# Patient Record
Sex: MALE | Race: BLACK OR AFRICAN AMERICAN | Employment: UNEMPLOYED | URBAN - METROPOLITAN AREA
[De-identification: names, ages, dates, MRNs, and addresses within clinical notes are randomized per-mention and may not be internally consistent; named-entity substitution may affect disease eponyms.]

---

## 2019-04-07 ENCOUNTER — HOSPITAL ENCOUNTER (EMERGENCY)
Age: 54
Discharge: HOME | End: 2019-04-07
Attending: EMERGENCY MEDICINE
Payer: MEDICARE

## 2019-04-07 DIAGNOSIS — Z53.20 PATIENT REFUSED EVALUATION OR TREATMENT: Primary | ICD-10-CM

## 2019-04-07 PROCEDURE — 99283 EMERGENCY DEPT VISIT LOW MDM: CPT

## 2019-04-08 NOTE — ED PROVIDER NOTES
[address]  ED  Emergency Department Encounter  EmergencyMedicine Resident     Pt Name:Robby Grissom  MRN: [id number]  [place] 1965  Date of evaluation: 4/7/19  PCP:  No primary care provider on file. CHIEF COMPLAINT       Chief Complaint   Patient presents with    Other     told he may have swallowed something. pt refusing treatment       HISTORY OF PRESENT ILLNESS  (Location/Symptom, Timing/Onset, Context/Setting, Quality, Duration, Modifying Factors, Severity.)      Marycarmen Moreno is a 47 y.o. male who presents with a complaint that he is refusing treatment, the patient was brought in by [employer] after booking he is reported to 10 same ingested something. The patient adamantly denies this. I explained to him his risks including death if he ingested something and did not how me. The patient refuses all treatment and refuses to let me evaluate him. He is alert and oriented ×3 and understands the risk of refusing treatment. PAST MEDICAL / SURGICAL / SOCIAL / FAMILY HISTORY      has no past medical history on file. has no past surgical history on file.     Social History     Socioeconomic History    Marital status: Single     Spouse name: Not on file    Number of children: Not on file    Years of education: Not on file    Highest education level: Not on file   Occupational History    Not on file   Social Needs    Financial resource strain: Not on file    Food insecurity:     Worry: Not on file     Inability: Not on file    Transportation needs:     Medical: Not on file     Non-medical: Not on file   Tobacco Use    Smoking status: Not on file   Substance and Sexual Activity    Alcohol use: Not on file    Drug use: Not on file    Sexual activity: Not on file   Lifestyle    Physical activity:     Days per week: Not on file     Minutes per session: Not on file    Stress: Not on file   Relationships    Social connections:     Talks on phone: Not on file     Gets together: Not on file     Attends [employer]ian service: Not on file     Active member of club or organization: Not on file     Attends meetings of clubs or organizations: Not on file     Relationship status: Not on file    Intimate partner violence:     Fear of current or ex partner: Not on file     Emotionally abused: Not on file     Physically abused: Not on file     Forced sexual activity: Not on file   Other Topics Concern    Not on file   Social History Narrative    Not on file       No family history on file. Allergies:  Patient has no known allergies. Home Medications:  Prior to Admission medications    Not on File       REVIEW OF SYSTEMS    (2-9 systems for level 4, 10 or more for level 5)      Review of Systems  She refuses to answer questions regarding his health. PHYSICAL EXAM   (up to 7 for level 4, 8 or more for level 5)      INITIAL VITALS:   There were no vitals taken for this visit. Physical Exam     And alert and oriented ×3 I believe he has the capacity to understand his medical decision making, he will not allow me evaluate him. DIFFERENTIAL  DIAGNOSIS     PLAN (LABS / IMAGING / EKG):  No orders of the defined types were placed in this encounter. MEDICATIONS ORDERED:  No orders of the defined types were placed in this encounter. DDX: Refusing treatment. DIAGNOSTIC RESULTS / EMERGENCY DEPARTMENT COURSE / MDM     LABS:  No results found for this visit on 04/07/19. RADIOLOGY:     No results found. MDM/EMERGENCY DEPARTMENT COURSE:      ED Course as of Apr 07 2053   Jessie Vegas Apr 07, 2019 2051 Patient brought in by [employer] after concern for ingestion, patient will not allow us to evaluate him he is alert and oriented ×3,  he has a medical decision making capacity. [KW]      ED Course User Index  [KW] El Hargrove DO           PROCEDURES:  None    CONSULTS:  None    CRITICAL CARE:  None    FINAL IMPRESSION      1.  Patient refused evaluation or treatment DISPOSITION / PLAN     DISPOSITION Decision To Discharge    PATIENT REFERRED TO:  No follow-up provider specified.     DISCHARGE MEDICATIONS:  New Prescriptions    No medications on file       Edel Charles DO  Emergency Medicine Resident    (Please note that portions of this note were completed with a voicerecognition program.  Efforts were made to edit the dictations but occasionally words are mis-transcribed.)       Edel Charles DO  04/07/19 7809

## 2019-04-08 NOTE — ED NOTES
Pt brought in in police custody. Per [employer] they saw him swallow something . Pt is denying swallowing anything and refusing treatment.  Will notify resident     Tameka Burns RN  04/07/19 2030

## 2020-04-12 ENCOUNTER — APPOINTMENT (OUTPATIENT)
Dept: GENERAL RADIOLOGY | Age: 55
End: 2020-04-12

## 2020-04-12 ENCOUNTER — HOSPITAL ENCOUNTER (OUTPATIENT)
Age: 55
Setting detail: OBSERVATION
Discharge: HOME | End: 2020-04-14
Attending: EMERGENCY MEDICINE | Admitting: INTERNAL MEDICINE

## 2020-04-12 ENCOUNTER — APPOINTMENT (OUTPATIENT)
Dept: CT IMAGING | Age: 55
End: 2020-04-12

## 2020-04-12 PROBLEM — K92.0 GASTROINTESTINAL HEMORRHAGE WITH HEMATEMESIS: Status: ACTIVE | Noted: 2020-04-12

## 2020-04-12 PROBLEM — R74.01 TRANSAMINASEMIA: Status: ACTIVE | Noted: 2020-04-12

## 2020-04-12 PROBLEM — R65.10 SIRS (SYSTEMIC INFLAMMATORY RESPONSE SYNDROME) (HCC): Status: ACTIVE | Noted: 2020-04-12

## 2020-04-12 PROBLEM — R31.9 HEMATURIA: Status: ACTIVE | Noted: 2020-04-12

## 2020-04-12 PROBLEM — Z20.822 SUSPECTED COVID-19 VIRUS INFECTION: Status: ACTIVE | Noted: 2020-04-12

## 2020-04-12 PROBLEM — F10.10 ETOH ABUSE: Status: ACTIVE | Noted: 2020-04-12

## 2020-04-12 PROBLEM — N30.90 CYSTITIS: Status: ACTIVE | Noted: 2020-04-12

## 2020-04-12 PROBLEM — R07.89 OTHER CHEST PAIN: Status: ACTIVE | Noted: 2020-04-12

## 2020-04-12 PROBLEM — E86.0 ACUTE DEHYDRATION: Status: ACTIVE | Noted: 2020-04-12

## 2020-04-12 LAB
-: ABNORMAL
ABO/RH: NORMAL
ABSOLUTE EOS #: 0.03 K/UL (ref 0–0.44)
ABSOLUTE IMMATURE GRANULOCYTE: <0.03 K/UL (ref 0–0.3)
ABSOLUTE LYMPH #: 1.9 K/UL (ref 1.1–3.7)
ABSOLUTE MONO #: 0.77 K/UL (ref 0.1–1.2)
ALBUMIN SERPL-MCNC: 4.6 G/DL (ref 3.5–5.2)
ALBUMIN/GLOBULIN RATIO: 0.9 (ref 1–2.5)
ALP BLD-CCNC: 94 U/L (ref 40–129)
ALT SERPL-CCNC: 102 U/L (ref 5–41)
AMORPHOUS: ABNORMAL
ANION GAP SERPL CALCULATED.3IONS-SCNC: 19 MMOL/L (ref 9–17)
ANTIBODY SCREEN: NEGATIVE
ARM BAND NUMBER: NORMAL
AST SERPL-CCNC: 99 U/L
BACTERIA: ABNORMAL
BASOPHILS # BLD: 0 % (ref 0–2)
BASOPHILS ABSOLUTE: <0.03 K/UL (ref 0–0.2)
BILIRUB SERPL-MCNC: 0.84 MG/DL (ref 0.3–1.2)
BILIRUBIN URINE: NEGATIVE
BNP INTERPRETATION: NORMAL
BUN BLDV-MCNC: 8 MG/DL (ref 6–20)
BUN/CREAT BLD: ABNORMAL (ref 9–20)
CALCIUM SERPL-MCNC: 10 MG/DL (ref 8.6–10.4)
CASTS UA: ABNORMAL /LPF (ref 0–8)
CHLORIDE BLD-SCNC: 96 MMOL/L (ref 98–107)
CO2: 19 MMOL/L (ref 20–31)
COLOR: YELLOW
CREAT SERPL-MCNC: 1.18 MG/DL (ref 0.7–1.2)
CRYSTALS, UA: ABNORMAL /HPF
DIFFERENTIAL TYPE: ABNORMAL
EOSINOPHILS RELATIVE PERCENT: 0 % (ref 1–4)
EPITHELIAL CELLS UA: ABNORMAL /HPF (ref 0–5)
EXPIRATION DATE: NORMAL
FERRITIN: 243 UG/L (ref 30–400)
GFR AFRICAN AMERICAN: >60 ML/MIN
GFR NON-AFRICAN AMERICAN: >60 ML/MIN
GFR SERPL CREATININE-BSD FRML MDRD: ABNORMAL ML/MIN/{1.73_M2}
GFR SERPL CREATININE-BSD FRML MDRD: ABNORMAL ML/MIN/{1.73_M2}
GLUCOSE BLD-MCNC: 98 MG/DL (ref 70–99)
GLUCOSE URINE: NEGATIVE
HCT VFR BLD CALC: 50.9 % (ref 40.7–50.3)
HEMOGLOBIN: 16.3 G/DL (ref 13–17)
IMMATURE GRANULOCYTES: 0 %
KETONES, URINE: ABNORMAL
LACTATE DEHYDROGENASE: 320 U/L (ref 135–225)
LACTIC ACID, SEPSIS WHOLE BLOOD: 2.2 MMOL/L (ref 0.5–1.9)
LACTIC ACID, SEPSIS WHOLE BLOOD: 5.8 MMOL/L (ref 0.5–1.9)
LACTIC ACID, SEPSIS: ABNORMAL MMOL/L (ref 0.5–1.9)
LACTIC ACID, SEPSIS: ABNORMAL MMOL/L (ref 0.5–1.9)
LEUKOCYTE ESTERASE, URINE: NEGATIVE
LIPASE: 41 U/L (ref 13–60)
LYMPHOCYTES # BLD: 23 % (ref 24–43)
MCH RBC QN AUTO: 30.5 PG (ref 25.2–33.5)
MCHC RBC AUTO-ENTMCNC: 32 G/DL (ref 28.4–34.8)
MCV RBC AUTO: 95.3 FL (ref 82.6–102.9)
MONOCYTES # BLD: 9 % (ref 3–12)
MUCUS: ABNORMAL
NITRITE, URINE: NEGATIVE
NRBC AUTOMATED: 0 PER 100 WBC
OTHER OBSERVATIONS UA: ABNORMAL
PDW BLD-RTO: 13.5 % (ref 11.8–14.4)
PH UA: 6.5 (ref 5–8)
PLATELET # BLD: 293 K/UL (ref 138–453)
PLATELET ESTIMATE: ABNORMAL
PMV BLD AUTO: 9.8 FL (ref 8.1–13.5)
POTASSIUM SERPL-SCNC: 4.7 MMOL/L (ref 3.7–5.3)
PRO-BNP: 49 PG/ML
PROTEIN UA: NEGATIVE
RBC # BLD: 5.34 M/UL (ref 4.21–5.77)
RBC # BLD: ABNORMAL 10*6/UL
RBC UA: ABNORMAL /HPF (ref 0–4)
RENAL EPITHELIAL, UA: ABNORMAL /HPF
SEG NEUTROPHILS: 68 % (ref 36–65)
SEGMENTED NEUTROPHILS ABSOLUTE COUNT: 5.43 K/UL (ref 1.5–8.1)
SODIUM BLD-SCNC: 134 MMOL/L (ref 135–144)
SPECIFIC GRAVITY UA: 1.03 (ref 1–1.03)
TOTAL PROTEIN: 9.5 G/DL (ref 6.4–8.3)
TRICHOMONAS: ABNORMAL
TROPONIN INTERP: NORMAL
TROPONIN INTERP: NORMAL
TROPONIN T: NORMAL NG/ML
TROPONIN T: NORMAL NG/ML
TROPONIN, HIGH SENSITIVITY: 14 NG/L (ref 0–22)
TROPONIN, HIGH SENSITIVITY: 16 NG/L (ref 0–22)
TURBIDITY: CLEAR
URINE HGB: ABNORMAL
UROBILINOGEN, URINE: NORMAL
WBC # BLD: 8.2 K/UL (ref 3.5–11.3)
WBC # BLD: ABNORMAL 10*3/UL
WBC UA: ABNORMAL /HPF (ref 0–5)
YEAST: ABNORMAL

## 2020-04-12 PROCEDURE — 0100U HC RESPIRPTHGN MULT REV TRANS & AMP PRB TECH 21 TRGT: CPT

## 2020-04-12 PROCEDURE — 80053 COMPREHEN METABOLIC PANEL: CPT

## 2020-04-12 PROCEDURE — 87491 CHLMYD TRACH DNA AMP PROBE: CPT

## 2020-04-12 PROCEDURE — 86900 BLOOD TYPING SEROLOGIC ABO: CPT

## 2020-04-12 PROCEDURE — U0002 COVID-19 LAB TEST NON-CDC: HCPCS

## 2020-04-12 PROCEDURE — 99285 EMERGENCY DEPT VISIT HI MDM: CPT

## 2020-04-12 PROCEDURE — 86901 BLOOD TYPING SEROLOGIC RH(D): CPT

## 2020-04-12 PROCEDURE — 6360000004 HC RX CONTRAST MEDICATION: Performed by: EMERGENCY MEDICINE

## 2020-04-12 PROCEDURE — 96376 TX/PRO/DX INJ SAME DRUG ADON: CPT

## 2020-04-12 PROCEDURE — 2580000003 HC RX 258: Performed by: EMERGENCY MEDICINE

## 2020-04-12 PROCEDURE — 96375 TX/PRO/DX INJ NEW DRUG ADDON: CPT

## 2020-04-12 PROCEDURE — 81001 URINALYSIS AUTO W/SCOPE: CPT

## 2020-04-12 PROCEDURE — 84484 ASSAY OF TROPONIN QUANT: CPT

## 2020-04-12 PROCEDURE — 86850 RBC ANTIBODY SCREEN: CPT

## 2020-04-12 PROCEDURE — 6360000002 HC RX W HCPCS: Performed by: NURSE PRACTITIONER

## 2020-04-12 PROCEDURE — 96365 THER/PROPH/DIAG IV INF INIT: CPT

## 2020-04-12 PROCEDURE — 83605 ASSAY OF LACTIC ACID: CPT

## 2020-04-12 PROCEDURE — 83615 LACTATE (LD) (LDH) ENZYME: CPT

## 2020-04-12 PROCEDURE — 87591 N.GONORRHOEAE DNA AMP PROB: CPT

## 2020-04-12 PROCEDURE — 99222 1ST HOSP IP/OBS MODERATE 55: CPT | Performed by: NURSE PRACTITIONER

## 2020-04-12 PROCEDURE — 6360000002 HC RX W HCPCS: Performed by: EMERGENCY MEDICINE

## 2020-04-12 PROCEDURE — 71045 X-RAY EXAM CHEST 1 VIEW: CPT

## 2020-04-12 PROCEDURE — 83690 ASSAY OF LIPASE: CPT

## 2020-04-12 PROCEDURE — 1200000000 HC SEMI PRIVATE

## 2020-04-12 PROCEDURE — 71275 CT ANGIOGRAPHY CHEST: CPT

## 2020-04-12 PROCEDURE — 83880 ASSAY OF NATRIURETIC PEPTIDE: CPT

## 2020-04-12 PROCEDURE — 82728 ASSAY OF FERRITIN: CPT

## 2020-04-12 PROCEDURE — G0378 HOSPITAL OBSERVATION PER HR: HCPCS

## 2020-04-12 PROCEDURE — 80307 DRUG TEST PRSMV CHEM ANLYZR: CPT

## 2020-04-12 PROCEDURE — 93005 ELECTROCARDIOGRAM TRACING: CPT | Performed by: EMERGENCY MEDICINE

## 2020-04-12 PROCEDURE — 85025 COMPLETE CBC W/AUTO DIFF WBC: CPT

## 2020-04-12 PROCEDURE — C9113 INJ PANTOPRAZOLE SODIUM, VIA: HCPCS | Performed by: EMERGENCY MEDICINE

## 2020-04-12 PROCEDURE — 96367 TX/PROPH/DG ADDL SEQ IV INF: CPT

## 2020-04-12 RX ORDER — MORPHINE SULFATE 4 MG/ML
4 INJECTION, SOLUTION INTRAMUSCULAR; INTRAVENOUS
Status: DISCONTINUED | OUTPATIENT
Start: 2020-04-12 | End: 2020-04-14 | Stop reason: HOSPADM

## 2020-04-12 RX ORDER — ONDANSETRON 2 MG/ML
4 INJECTION INTRAMUSCULAR; INTRAVENOUS EVERY 6 HOURS PRN
Status: DISCONTINUED | OUTPATIENT
Start: 2020-04-12 | End: 2020-04-14 | Stop reason: HOSPADM

## 2020-04-12 RX ORDER — 0.9 % SODIUM CHLORIDE 0.9 %
1000 INTRAVENOUS SOLUTION INTRAVENOUS ONCE
Status: COMPLETED | OUTPATIENT
Start: 2020-04-12 | End: 2020-04-12

## 2020-04-12 RX ORDER — ONDANSETRON 2 MG/ML
4 INJECTION INTRAMUSCULAR; INTRAVENOUS ONCE
Status: COMPLETED | OUTPATIENT
Start: 2020-04-12 | End: 2020-04-12

## 2020-04-12 RX ORDER — METOCLOPRAMIDE HYDROCHLORIDE 5 MG/ML
10 INJECTION INTRAMUSCULAR; INTRAVENOUS ONCE
Status: COMPLETED | OUTPATIENT
Start: 2020-04-12 | End: 2020-04-12

## 2020-04-12 RX ORDER — DIPHENHYDRAMINE HYDROCHLORIDE 50 MG/ML
25 INJECTION INTRAMUSCULAR; INTRAVENOUS ONCE
Status: COMPLETED | OUTPATIENT
Start: 2020-04-12 | End: 2020-04-12

## 2020-04-12 RX ORDER — PROMETHAZINE HYDROCHLORIDE 25 MG/1
12.5 TABLET ORAL EVERY 6 HOURS PRN
Status: DISCONTINUED | OUTPATIENT
Start: 2020-04-12 | End: 2020-04-14 | Stop reason: HOSPADM

## 2020-04-12 RX ORDER — MORPHINE SULFATE 4 MG/ML
2 INJECTION, SOLUTION INTRAMUSCULAR; INTRAVENOUS
Status: DISCONTINUED | OUTPATIENT
Start: 2020-04-12 | End: 2020-04-14 | Stop reason: HOSPADM

## 2020-04-12 RX ADMIN — MORPHINE SULFATE 4 MG: 4 INJECTION INTRAVENOUS at 22:40

## 2020-04-12 RX ADMIN — SODIUM CHLORIDE 1000 ML: 9 INJECTION, SOLUTION INTRAVENOUS at 14:30

## 2020-04-12 RX ADMIN — OCTREOTIDE ACETATE 25 MCG/HR: 500 INJECTION, SOLUTION INTRAVENOUS; SUBCUTANEOUS at 15:31

## 2020-04-12 RX ADMIN — IOHEXOL 100 ML: 350 INJECTION, SOLUTION INTRAVENOUS at 16:41

## 2020-04-12 RX ADMIN — ONDANSETRON 4 MG: 2 INJECTION INTRAMUSCULAR; INTRAVENOUS at 22:40

## 2020-04-12 RX ADMIN — SODIUM CHLORIDE 8 MG/HR: 9 INJECTION, SOLUTION INTRAVENOUS at 16:17

## 2020-04-12 RX ADMIN — CEFTRIAXONE SODIUM 2 G: 2 INJECTION, POWDER, FOR SOLUTION INTRAMUSCULAR; INTRAVENOUS at 14:37

## 2020-04-12 RX ADMIN — DIPHENHYDRAMINE HYDROCHLORIDE 25 MG: 50 INJECTION, SOLUTION INTRAMUSCULAR; INTRAVENOUS at 15:32

## 2020-04-12 RX ADMIN — ONDANSETRON 4 MG: 2 INJECTION INTRAMUSCULAR; INTRAVENOUS at 14:10

## 2020-04-12 RX ADMIN — METOCLOPRAMIDE 10 MG: 5 INJECTION, SOLUTION INTRAMUSCULAR; INTRAVENOUS at 15:31

## 2020-04-12 RX ADMIN — SODIUM CHLORIDE 80 MG: 9 INJECTION, SOLUTION INTRAVENOUS at 15:31

## 2020-04-12 ASSESSMENT — ENCOUNTER SYMPTOMS
BLOOD IN STOOL: 0
STRIDOR: 0
WHEEZING: 0
RHINORRHEA: 0
DIARRHEA: 0
CONSTIPATION: 0
SHORTNESS OF BREATH: 1
VOMITING: 1
NAUSEA: 0
COUGH: 1
ABDOMINAL PAIN: 1
CHEST TIGHTNESS: 1

## 2020-04-12 ASSESSMENT — PAIN SCALES - GENERAL: PAINLEVEL_OUTOF10: 7

## 2020-04-12 NOTE — ED PROVIDER NOTES
Relationships    Social connections     Talks on phone: Not on file     Gets together: Not on file     Attends Anabaptist service: Not on file     Active member of club or organization: Not on file     Attends meetings of clubs or organizations: Not on file     Relationship status: Not on file    Intimate partner violence     Fear of current or ex partner: Not on file     Emotionally abused: Not on file     Physically abused: Not on file     Forced sexual activity: Not on file   Other Topics Concern    Not on file   Social History Narrative    Not on file       No family history on file. Allergies:  Patient has no known allergies. Home Medications:  Prior to Admission medications    Not on File       REVIEW OF SYSTEMS    (2-9 systems for level 4, 10 or more for level 5)      Review of Systems   Unable to perform ROS: Acuity of condition   Constitutional: Negative for fever. HENT: Negative for rhinorrhea. Respiratory: Positive for cough and shortness of breath. Cardiovascular: Positive for chest pain (\"tightness\"). Gastrointestinal: Positive for abdominal pain and vomiting. Genitourinary: Negative for difficulty urinating. Musculoskeletal: Negative for myalgias. Skin: Negative for wound. Neurological: Negative for syncope. PHYSICAL EXAM   (up to 7 for level 4, 8 or more for level 5)      INITIAL VITALS:   BP (!) 144/94   Pulse 94   Temp 98.9 °F (37.2 °C) (Oral)   Resp 19   SpO2 92%     Physical Exam  Constitutional:       General: He is not in acute distress. Appearance: He is well-developed. He is not diaphoretic. Comments: Actively vomiting on initial physical exam   HENT:      Head: Normocephalic and atraumatic. Eyes:      General:         Right eye: No discharge. Left eye: No discharge. Neck:      Trachea: No tracheal deviation. Pulmonary:      Effort: Pulmonary effort is normal. No respiratory distress. Breath sounds: No stridor.    Abdominal: COMPARISON: None. HISTORY: ORDERING SYSTEM PROVIDED HISTORY: chest pain, vomiting blood, abdominal pain TECHNOLOGIST PROVIDED HISTORY: chest pain, vomiting blood, abdominal pain Reason for Exam: ABDOMIN PAIN, VOMITTING, CHEST PAIN Acuity: Unknown Type of Exam: Unknown FINDINGS: CTA CHEST: Vascular calcifications are noted. Aorta is normal in caliber. Wall thickening of the distal esophagus is noted. No enlarged mediastinal lymph nodes are noted. There is no hilar adenopathy. Main pulmonary arteries are patent. Soft tissues: No axillary adenopathy is noted. 2 cm low-density left thyroid lesion is noted. Correlate with ultrasound. Dependent atelectasis is noted. No lung consolidation is noted. There is no pneumothorax. Small metallic density in the left lateral chest wall soft tissues is noted on image 92. Bones: Minimal endplate spurring in the thoracic spine is noted. A few small endplate Schmorl's node deformities are noted. There is no destructive bone lesion. Left 4th rib fracture is noted laterally. CTA ABDOMEN: Aorta is normal in caliber. There is no dissection. Major branch vasculature is patent. Organs: Low-density throughout the liver is noted suggesting fatty infiltration. Relative hyperdense lesion in the posterior aspect of the right lobe of the liver is noted on axial image 100 measuring approximately 8.5 mm. This was present on old exam.  Flash filling hemangioma is favored. No pancreatic or splenic lesion is noted. No gallstones are noted. Adrenal glands are normal.  No hydronephrosis or renal lesion is noted. Mesentery and retroperitoneum: No pathologic adenopathy is noted. There is no mesenteric hematoma, mass or fluid collection Bowel segmental incomplete colonic distention is noted. Stool is noted in the colon. No disproportionate small or large bowel distention is noted. There is no pneumatosis. CTA PELVIS: No aneurysm or dissection is noted.  Urinary bladder wall thickening PROCEDURES:  None    CONSULTS:  IP CONSULT TO GI  IP CONSULT TO HOSPITALIST    CRITICAL CARE:  Please see attending physician note. FINAL IMPRESSION      1. Gastrointestinal hemorrhage, unspecified gastrointestinal hemorrhage type    2. Transaminitis        DISPOSITION / PLAN     DISPOSITION  Admission    PATIENT REFERRED TO:  No follow-up provider specified.     DISCHARGE MEDICATIONS:  New Prescriptions    No medications on file       Mona Dickey DO  Emergency Medicine Resident    (Please note that portions ofthis note were completed with a voice recognition program.  Efforts were made to edit the dictations but occasionally words are mis-transcribed.)       Mona Dickey DO  Resident  04/12/20 2654

## 2020-04-12 NOTE — ED NOTES
Bed: 46PED  Expected date:   Expected time:   Means of arrival:   Comments:  [address], RN  04/12/20 9042

## 2020-04-12 NOTE — ED NOTES
Pt has another episode of emesis, emesis basin spilled on floor. Room cleaned and pt repositioned in bed. Will continue to monitor.       Preethi Nichols RN  04/12/20 8122

## 2020-04-12 NOTE — ED PROVIDER NOTES
[address] [facility]     Emergency Department     Faculty Attestation    I performed a history and physical examination of the patient and discussed management with the resident. I reviewed the residents note and agree with the documented findings and plan of care. Any areas of disagreement are noted on the chart. I was personally present for the key portions of any procedures. I have documented in the chart those procedures where I was not present during the key portions. I have reviewed the emergency nurses triage note. I agree with the chief complaint, past medical history, past surgical history, allergies, medications, social and family history as documented unless otherwise noted below. For Physician Assistant/ Nurse Practitioner cases/documentation I have personally evaluated this patient and have completed at least one if not all key elements of the E/M (history, physical exam, and MDM). Additional findings are as noted. Patient presents with abdominal pain, nausea, vomiting, shortness of breath. Will I was in the room, patient was actively vomiting what appears to be hematemesis. Patient denies being on any blood thinners. He says he did binge alcohol last night. He denies any history of GI bleed and says he is never had a colonoscopy or endoscopy. Patient says he has had shortness of breath while going upstairs for the past couple of days. He is unable to tell me if he has had any fevers recently. He is diaphoretic in the room. He says he has had some chest tightness as well. Will treat with Protonix and octreotide as well as Zofran. We will get an EKG, CT scan of chest and abdomen, labs, and plan to admit.     EKG Interpretation    Interpreted by me    Rhythm: normal sinus   Rate: normal  Axis: normal  Ectopy: none  Conduction: normal  ST Segments: no acute change  T Waves: no acute change  Q Waves: none    Clinical Impression: no acute changes and normal EKG      Daily Burton MD  Attending Emergency  Physician              Barrie Sommers MD  04/12/20 0848

## 2020-04-13 ENCOUNTER — APPOINTMENT (OUTPATIENT)
Dept: GENERAL RADIOLOGY | Age: 55
End: 2020-04-13

## 2020-04-13 PROBLEM — K92.2 GASTROINTESTINAL HEMORRHAGE: Status: ACTIVE | Noted: 2020-04-12

## 2020-04-13 PROBLEM — F14.90 COCAINE USE: Status: ACTIVE | Noted: 2020-04-13

## 2020-04-13 PROBLEM — K76.0 FATTY LIVER: Status: ACTIVE | Noted: 2020-04-13

## 2020-04-13 LAB
ABSOLUTE EOS #: 0.04 K/UL (ref 0–0.44)
ABSOLUTE IMMATURE GRANULOCYTE: <0.03 K/UL (ref 0–0.3)
ABSOLUTE LYMPH #: 1.78 K/UL (ref 1.1–3.7)
ABSOLUTE MONO #: 0.77 K/UL (ref 0.1–1.2)
ADENOVIRUS PCR: NOT DETECTED
ALBUMIN SERPL-MCNC: 3.9 G/DL (ref 3.5–5.2)
ALBUMIN/GLOBULIN RATIO: 0.9 (ref 1–2.5)
ALP BLD-CCNC: 82 U/L (ref 40–129)
ALT SERPL-CCNC: 78 U/L (ref 5–41)
AMPHETAMINE SCREEN URINE: NEGATIVE
ANION GAP SERPL CALCULATED.3IONS-SCNC: 15 MMOL/L (ref 9–17)
ANION GAP SERPL CALCULATED.3IONS-SCNC: 15 MMOL/L (ref 9–17)
AST SERPL-CCNC: 60 U/L
BARBITURATE SCREEN URINE: NEGATIVE
BASOPHILS # BLD: 0 % (ref 0–2)
BASOPHILS ABSOLUTE: <0.03 K/UL (ref 0–0.2)
BENZODIAZEPINE SCREEN, URINE: NEGATIVE
BILIRUB SERPL-MCNC: 1.03 MG/DL (ref 0.3–1.2)
BILIRUBIN DIRECT: 0.25 MG/DL
BILIRUBIN, INDIRECT: 0.78 MG/DL (ref 0–1)
BORDETELLA PARAPERTUSSIS: NOT DETECTED
BORDETELLA PERTUSSIS PCR: NOT DETECTED
BUN BLDV-MCNC: 10 MG/DL (ref 6–20)
BUN BLDV-MCNC: 7 MG/DL (ref 6–20)
BUN BLDV-MCNC: 8 MG/DL (ref 6–20)
BUN/CREAT BLD: ABNORMAL (ref 9–20)
BUPRENORPHINE URINE: ABNORMAL
C. TRACHOMATIS DNA ,URINE: NEGATIVE
CALCIUM IONIZED: 1.09 MMOL/L (ref 1.13–1.33)
CALCIUM SERPL-MCNC: 8.4 MG/DL (ref 8.6–10.4)
CALCIUM SERPL-MCNC: 8.8 MG/DL (ref 8.6–10.4)
CANNABINOID SCREEN URINE: NEGATIVE
CHLAMYDIA PNEUMONIAE BY PCR: NOT DETECTED
CHLORIDE BLD-SCNC: 96 MMOL/L (ref 98–107)
CHLORIDE BLD-SCNC: 97 MMOL/L (ref 98–107)
CO2: 23 MMOL/L (ref 20–31)
CO2: 24 MMOL/L (ref 20–31)
COCAINE METABOLITE, URINE: POSITIVE
CORONAVIRUS 229E PCR: NOT DETECTED
CORONAVIRUS HKU1 PCR: NOT DETECTED
CORONAVIRUS NL63 PCR: NOT DETECTED
CORONAVIRUS OC43 PCR: NOT DETECTED
CREAT SERPL-MCNC: 1 MG/DL (ref 0.7–1.2)
CREAT SERPL-MCNC: 1.11 MG/DL (ref 0.7–1.2)
CREAT SERPL-MCNC: 1.13 MG/DL (ref 0.7–1.2)
DIFFERENTIAL TYPE: NORMAL
EOSINOPHILS RELATIVE PERCENT: 1 % (ref 1–4)
GFR AFRICAN AMERICAN: >60 ML/MIN
GFR NON-AFRICAN AMERICAN: >60 ML/MIN
GFR SERPL CREATININE-BSD FRML MDRD: ABNORMAL ML/MIN/{1.73_M2}
GFR SERPL CREATININE-BSD FRML MDRD: NORMAL ML/MIN/{1.73_M2}
GFR SERPL CREATININE-BSD FRML MDRD: NORMAL ML/MIN/{1.73_M2}
GLUCOSE BLD-MCNC: 112 MG/DL (ref 70–99)
GLUCOSE BLD-MCNC: 114 MG/DL (ref 70–99)
HAV IGM SER IA-ACNC: NONREACTIVE
HCT VFR BLD CALC: 43.7 % (ref 40.7–50.3)
HCT VFR BLD CALC: 44.4 % (ref 40.7–50.3)
HCT VFR BLD CALC: 46 % (ref 40.7–50.3)
HCT VFR BLD CALC: 46.1 % (ref 40.7–50.3)
HEMOGLOBIN: 13.5 G/DL (ref 13–17)
HEMOGLOBIN: 14.2 G/DL (ref 13–17)
HEMOGLOBIN: 14.5 G/DL (ref 13–17)
HEMOGLOBIN: 14.5 G/DL (ref 13–17)
HEPATITIS B CORE IGM ANTIBODY: NONREACTIVE
HEPATITIS B SURFACE ANTIGEN: NONREACTIVE
HEPATITIS C ANTIBODY: NONREACTIVE
HUMAN METAPNEUMOVIRUS PCR: NOT DETECTED
IMMATURE GRANULOCYTES: 0 %
INFLUENZA A BY PCR: NOT DETECTED
INFLUENZA A H1 (2009) PCR: NORMAL
INFLUENZA A H1 PCR: NORMAL
INFLUENZA A H3 PCR: NORMAL
INFLUENZA B BY PCR: NOT DETECTED
INR BLD: 1.1
LACTIC ACID, SEPSIS WHOLE BLOOD: 1.3 MMOL/L (ref 0.5–1.9)
LACTIC ACID, SEPSIS: NORMAL MMOL/L (ref 0.5–1.9)
LIPASE: 22 U/L (ref 13–60)
LYMPHOCYTES # BLD: 28 % (ref 24–43)
MAGNESIUM: 2.2 MG/DL (ref 1.6–2.6)
MCH RBC QN AUTO: 30.9 PG (ref 25.2–33.5)
MCHC RBC AUTO-ENTMCNC: 32 G/DL (ref 28.4–34.8)
MCV RBC AUTO: 96.7 FL (ref 82.6–102.9)
MDMA URINE: ABNORMAL
METHADONE SCREEN, URINE: NEGATIVE
METHAMPHETAMINE, URINE: ABNORMAL
MONOCYTES # BLD: 12 % (ref 3–12)
MYCOPLASMA PNEUMONIAE PCR: NOT DETECTED
N. GONORRHOEAE DNA, URINE: NEGATIVE
NRBC AUTOMATED: 0 PER 100 WBC
OPIATES, URINE: NEGATIVE
OXYCODONE SCREEN URINE: NEGATIVE
PARAINFLUENZA 1 PCR: NOT DETECTED
PARAINFLUENZA 2 PCR: NOT DETECTED
PARAINFLUENZA 3 PCR: NOT DETECTED
PARAINFLUENZA 4 PCR: NOT DETECTED
PDW BLD-RTO: 13.5 % (ref 11.8–14.4)
PHENCYCLIDINE, URINE: NEGATIVE
PHOSPHORUS: 3.9 MG/DL (ref 2.5–4.5)
PLATELET # BLD: 242 K/UL (ref 138–453)
PLATELET ESTIMATE: NORMAL
PMV BLD AUTO: 9.9 FL (ref 8.1–13.5)
POTASSIUM SERPL-SCNC: 3.7 MMOL/L (ref 3.7–5.3)
POTASSIUM SERPL-SCNC: 3.7 MMOL/L (ref 3.7–5.3)
PROPOXYPHENE, URINE: ABNORMAL
PROTHROMBIN TIME: 11.6 SEC (ref 9–12)
RBC # BLD: 4.59 M/UL (ref 4.21–5.77)
RBC # BLD: NORMAL 10*6/UL
RESP SYNCYTIAL VIRUS PCR: NOT DETECTED
RHINO/ENTEROVIRUS PCR: NOT DETECTED
SARS-COV-2, NAA: NORMAL
SARS-COV-2, PCR: NORMAL
SARS-COV-2: NOT DETECTED
SEG NEUTROPHILS: 59 % (ref 36–65)
SEGMENTED NEUTROPHILS ABSOLUTE COUNT: 3.66 K/UL (ref 1.5–8.1)
SODIUM BLD-SCNC: 134 MMOL/L (ref 135–144)
SODIUM BLD-SCNC: 136 MMOL/L (ref 135–144)
SOURCE: NORMAL
SPECIMEN DESCRIPTION: NORMAL
SPECIMEN DESCRIPTION: NORMAL
TEST INFORMATION: ABNORMAL
TOTAL PROTEIN: 8.1 G/DL (ref 6.4–8.3)
TRICYCLIC ANTIDEPRESSANTS, UR: ABNORMAL
WBC # BLD: 6.3 K/UL (ref 3.5–11.3)
WBC # BLD: NORMAL 10*3/UL

## 2020-04-13 PROCEDURE — G0378 HOSPITAL OBSERVATION PER HR: HCPCS

## 2020-04-13 PROCEDURE — 99253 IP/OBS CNSLTJ NEW/EST LOW 45: CPT | Performed by: NURSE PRACTITIONER

## 2020-04-13 PROCEDURE — 2580000003 HC RX 258: Performed by: EMERGENCY MEDICINE

## 2020-04-13 PROCEDURE — 36415 COLL VENOUS BLD VENIPUNCTURE: CPT

## 2020-04-13 PROCEDURE — 6360000002 HC RX W HCPCS: Performed by: EMERGENCY MEDICINE

## 2020-04-13 PROCEDURE — 85018 HEMOGLOBIN: CPT

## 2020-04-13 PROCEDURE — 83605 ASSAY OF LACTIC ACID: CPT

## 2020-04-13 PROCEDURE — 80074 ACUTE HEPATITIS PANEL: CPT

## 2020-04-13 PROCEDURE — 85025 COMPLETE CBC W/AUTO DIFF WBC: CPT

## 2020-04-13 PROCEDURE — 85014 HEMATOCRIT: CPT

## 2020-04-13 PROCEDURE — 71045 X-RAY EXAM CHEST 1 VIEW: CPT

## 2020-04-13 PROCEDURE — 96376 TX/PRO/DX INJ SAME DRUG ADON: CPT

## 2020-04-13 PROCEDURE — 82565 ASSAY OF CREATININE: CPT

## 2020-04-13 PROCEDURE — 82248 BILIRUBIN DIRECT: CPT

## 2020-04-13 PROCEDURE — 84520 ASSAY OF UREA NITROGEN: CPT

## 2020-04-13 PROCEDURE — 2580000003 HC RX 258: Performed by: NURSE PRACTITIONER

## 2020-04-13 PROCEDURE — 99254 IP/OBS CNSLTJ NEW/EST MOD 60: CPT | Performed by: INTERNAL MEDICINE

## 2020-04-13 PROCEDURE — 51798 US URINE CAPACITY MEASURE: CPT

## 2020-04-13 PROCEDURE — 99232 SBSQ HOSP IP/OBS MODERATE 35: CPT | Performed by: NURSE PRACTITIONER

## 2020-04-13 PROCEDURE — 6360000002 HC RX W HCPCS: Performed by: NURSE PRACTITIONER

## 2020-04-13 PROCEDURE — C9113 INJ PANTOPRAZOLE SODIUM, VIA: HCPCS | Performed by: EMERGENCY MEDICINE

## 2020-04-13 PROCEDURE — 80053 COMPREHEN METABOLIC PANEL: CPT

## 2020-04-13 PROCEDURE — 83735 ASSAY OF MAGNESIUM: CPT

## 2020-04-13 PROCEDURE — 80048 BASIC METABOLIC PNL TOTAL CA: CPT

## 2020-04-13 PROCEDURE — 83690 ASSAY OF LIPASE: CPT

## 2020-04-13 PROCEDURE — 85610 PROTHROMBIN TIME: CPT

## 2020-04-13 PROCEDURE — 84100 ASSAY OF PHOSPHORUS: CPT

## 2020-04-13 PROCEDURE — 2060000000 HC ICU INTERMEDIATE R&B

## 2020-04-13 PROCEDURE — 82330 ASSAY OF CALCIUM: CPT

## 2020-04-13 RX ORDER — PANTOPRAZOLE SODIUM 40 MG/1
40 TABLET, DELAYED RELEASE ORAL
Status: DISCONTINUED | OUTPATIENT
Start: 2020-04-14 | End: 2020-04-14

## 2020-04-13 RX ORDER — OMEPRAZOLE 10 MG/1
20 CAPSULE, DELAYED RELEASE ORAL DAILY
Status: ON HOLD | COMMUNITY
End: 2020-04-14 | Stop reason: HOSPADM

## 2020-04-13 RX ORDER — SODIUM CHLORIDE 9 MG/ML
INJECTION, SOLUTION INTRAVENOUS CONTINUOUS
Status: DISCONTINUED | OUTPATIENT
Start: 2020-04-13 | End: 2020-04-14 | Stop reason: HOSPADM

## 2020-04-13 RX ORDER — SODIUM CHLORIDE 0.9 % (FLUSH) 0.9 %
10 SYRINGE (ML) INJECTION EVERY 12 HOURS SCHEDULED
Status: DISCONTINUED | OUTPATIENT
Start: 2020-04-13 | End: 2020-04-14 | Stop reason: HOSPADM

## 2020-04-13 RX ORDER — ACETAMINOPHEN 325 MG/1
650 TABLET ORAL EVERY 4 HOURS PRN
Status: DISCONTINUED | OUTPATIENT
Start: 2020-04-13 | End: 2020-04-14 | Stop reason: HOSPADM

## 2020-04-13 RX ORDER — SODIUM CHLORIDE 0.9 % (FLUSH) 0.9 %
10 SYRINGE (ML) INJECTION PRN
Status: DISCONTINUED | OUTPATIENT
Start: 2020-04-13 | End: 2020-04-14 | Stop reason: HOSPADM

## 2020-04-13 RX ADMIN — SODIUM CHLORIDE: 9 INJECTION, SOLUTION INTRAVENOUS at 22:44

## 2020-04-13 RX ADMIN — OCTREOTIDE ACETATE 25 MCG/HR: 500 INJECTION, SOLUTION INTRAVENOUS; SUBCUTANEOUS at 13:04

## 2020-04-13 RX ADMIN — SODIUM CHLORIDE 8 MG/HR: 9 INJECTION, SOLUTION INTRAVENOUS at 02:42

## 2020-04-13 RX ADMIN — MORPHINE SULFATE 2 MG: 4 INJECTION, SOLUTION INTRAMUSCULAR; INTRAVENOUS at 20:53

## 2020-04-13 RX ADMIN — SODIUM CHLORIDE: 9 INJECTION, SOLUTION INTRAVENOUS at 00:33

## 2020-04-13 RX ADMIN — SODIUM CHLORIDE: 9 INJECTION, SOLUTION INTRAVENOUS at 09:10

## 2020-04-13 RX ADMIN — SODIUM CHLORIDE: 9 INJECTION, SOLUTION INTRAVENOUS at 21:40

## 2020-04-13 RX ADMIN — MORPHINE SULFATE 4 MG: 4 INJECTION INTRAVENOUS at 08:50

## 2020-04-13 RX ADMIN — Medication 10 ML: at 22:45

## 2020-04-13 ASSESSMENT — PAIN SCALES - GENERAL
PAINLEVEL_OUTOF10: 6
PAINLEVEL_OUTOF10: 7
PAINLEVEL_OUTOF10: 0
PAINLEVEL_OUTOF10: 7

## 2020-04-13 ASSESSMENT — PAIN DESCRIPTION - LOCATION: LOCATION: ABDOMEN

## 2020-04-13 NOTE — CONSULTS
[facility] [place] [facility]  Consultation Note     . REASON FOR CONSULTATION:    Upper GI bleed    HISTORY OF PRESENT ILLNESS:       Pt is COVID positive and at this time HPI taken from chart, staff as to preserve PPE   No actual physical exam is completed    Pt was admitted with c/o abdominal pain, nausea, vomiting, shortness of breath, dyspnea, chest pain/tightness that began aprox 1 week ago. He reports 1 episode of hematemesis. Pt is reported to not be a reliable historian or clearly describing sx when asked. Pt states he does drink-but not clear on quantity, type of alcohol    Pt's CBC, BMP WNL, slightly elevated Transaminase with ALT 78, AST 60. T. Bili normal at 10.3, Lipase normal 22. Acute Hepatitis panel was non-reactive    4/13/2020 Chest xray shows left basilar atelectasis without acute cardiopulmonary process identified  4/12/2020 CTA A/P indicated Esophageal wall thickening, fatty liver, ?? Flash filling hemangioma of right lobe of liver-see report for details    Pt was started on PPI drip, Octreotide drip, and Rocephin-given once    Drug tox was positive for Cocaine    Previous GI history:   No endoscopies on file in [facility] or [facility] everywhere    PAST MEDICAL HISTORY:  Past Medical History:   Diagnosis Date    GERD (gastroesophageal reflux disease)     Patient denies medical problems      History reviewed. No pertinent surgical history. ALLERGIES:  No Known Allergies    HOME MEDICATIONS:  Prior to Admission medications    Medication Sig Start Date End Date Taking? Authorizing Provider   omeprazole (PRILOSEC) 10 MG delayed release capsule Take 20 mg by mouth daily   Yes Historical Provider, MD     .  CURRENT MEDICATIONS:  Scheduled Meds:   sodium chloride flush  10 mL Intravenous 2 times per day     .   Continuous Infusions:   sodium chloride 100 mL/hr at 04/13/20 0910    pantoprozole (PROTONIX) infusion 8 mg/hr (04/13/20 0242)    octreotide (SANDOSTATIN) infusion 25 mcg/hr (04/12/20 1531)     .  PRN Meds:sodium chloride flush, acetaminophen, morphine **OR** morphine, promethazine **OR** ondansetron  .   SOCIAL HISTORY:     Social History     Socioeconomic History    Marital status: Single     Spouse name: Not on file    Number of children: Not on file    Years of education: Not on file    Highest education level: Not on file   Occupational History    Not on file   Social Needs    Financial resource strain: Not on file    Food insecurity     Worry: Not on file     Inability: Not on file    Transportation needs     Medical: Not on file     Non-medical: Not on file   Tobacco Use    Smoking status: Current Every Day Smoker     Packs/day: 1.00     Types: Cigarettes    Smokeless tobacco: Never Used   Substance and Sexual Activity    Alcohol use: Yes     Comment: pt states he \"drinks beer once a week\"    Drug use: Yes     Types: Cocaine    Sexual activity: Not on file   Lifestyle    Physical activity     Days per week: Not on file     Minutes per session: Not on file    Stress: Not on file   Relationships    Social connections     Talks on phone: Not on file     Gets together: Not on file     Attends Restorationist service: Not on file     Active member of club or organization: Not on file     Attends meetings of clubs or organizations: Not on file     Relationship status: Not on file    Intimate partner violence     Fear of current or ex partner: Not on file     Emotionally abused: Not on file     Physically abused: Not on file     Forced sexual activity: Not on file   Other Topics Concern    Not on file   Social History Narrative    Not on file       FAMILY HISTORY:   Family History   Problem Relation Age of Onset    Hypertension Mother     Hypertension Father        REVIEW OF SYSTEMS:     DAVID-deferred due to COVID 19 isolation    PHYSICAL EXAM:    /73   Pulse 81   Temp 99 °F (37.2 °C) (Oral)   Resp 15   Ht 6' 5\" (1.956 m)   Wt 251 lb 15.8 oz (114.3 kg)   SpO2 96%   BMI lesion.  Left 4th rib fracture is noted laterally.           CTA ABDOMEN:       Aorta is normal in caliber.  There is no dissection.  Major branch   vasculature is patent.       Organs: Low-density throughout the liver is noted suggesting fatty   infiltration.  Relative hyperdense lesion in the posterior aspect of the   right lobe of the liver is noted on axial image 100 measuring approximately   8.5 mm.  This was present on old exam.  Flash filling hemangioma is favored. No pancreatic or splenic lesion is noted.  No gallstones are noted.  Adrenal   glands are normal.  No hydronephrosis or renal lesion is noted.       Mesentery and retroperitoneum: No pathologic adenopathy is noted.  There is   no mesenteric hematoma, mass or fluid collection       Bowel segmental incomplete colonic distention is noted.  Stool is noted in   the colon.  No disproportionate small or large bowel distention is noted. There is no pneumatosis.            CTA PELVIS:       No aneurysm or dissection is noted.       Urinary bladder wall thickening is suggested.  Incomplete distention of the   rectosigmoid is noted.  Appendix is normal.  No dilated small bowel loops are   noted.  No pelvic fluid collection, hematoma or adenopathy is noted.       Soft tissues: No inguinal adenopathy is noted.       Bones: Degenerative changes in the spine are noted.  Degenerative changes are   noted in the SI joints.  No acute fractures are noted.           THORACIC/LUMBAR SPINE:       Please see separate thoracic/lumbar spine CT report.           Impression   No acute traumatic vascular abnormality in the chest, abdomen or pelvis.       Left 4th rib fracture without pneumothorax.  A small amount of adjacent   lobular intermediate density along the pleural surface is noted suggesting   edema or hemorrhage along the pleura.       Wall thickening in the distal 3rd of the esophagus.       Fatty infiltration of the liver       Probable flash filling

## 2020-04-13 NOTE — ED NOTES
Pt told that we need a urine sample. Pt given new urinal but is not able to urinate at this time. Patient has no signs or symptoms of acute distress at this time, remains on continuous telemetry and pulse ox monitoring.       Brigida Rodriguez RN  04/12/20 3719

## 2020-04-13 NOTE — CONSULTS
[facility] - Initial Consult Note  Today's Date and Time: 4/13/2020, 2:02 PM    Impression :   · GI Bleed  · ETOH abuse  · COPD  · Concern for COVID but Covid test is negative    Recommendations:   · Monitor off antibiotics  · OK for discharge from an ID standpoint  · Covid has been excluded  · Pt to follow up with GI service    Medical Decision Making/Summary/Discussion:4/13/2020     ·   Infection Control Recommendations   · [place] Precautions    Antimicrobial Stewardship Recommendations     · Discontinuation of therapy  Coordination of Outpatient Care:   · Estimated Length of IV antimicrobials: None  · Patient will need Midline Catheter Insertion:  No  · Patient will need PICC line Insertion: No  · Patient will need: Home IV , [place],  SNF,  LTAC: No  · Patient will need outpatient wound care: No    Chief complaint/reason for consultation:   · RO COVID    History of Present Illness:   Clotilde Parra is a 54y.o.-year-old  male who was initially admitted on 4/12/2020. Patient seen at the request of [facility]    INITIAL HISTORY:     Pt is a 53 yo gentleman who presented to [facility] ED on 4-12 with hematemesis and abdominal pain. He states that he ran out of his Prilosec 3 days ago and had been drinking 'a lot'. His stomach started to hurt, which felt like his typical GERD symptoms, and then he started to vomit. He states that he continued to drink (he was drinking Gin and Beer), and called a friend to take him to get some Prilosec and he started to vomit and couldn't stop. He then noted blood in his emesis and asked his friend to call 911. He does report a chronic cough and progressive SOB over the past few years. Pt states that he smokes 1 ppd for 30 or more years. Approximately 5 years ago he was told that he needed to start using an inhaler, but he never did. In the ED labs showed:  Lactate 5.8->2.2, ALT 94, AST 99, WBC 8.2.  Tox screen Detected     Medical Decision Making-Other:     Note:  · Labs, medications, radiologic studies were reviewed with personal review of films  · Moderate Large amounts of data were reviewed  · Discussed with nursing Staff, Discharge planner  · Infection Control and Prevention measures reviewed  · All prior entries were reviewed  · Administer medications as ordered  · Prognosis: Fair  · Discharge planning reviewed  · Follow up as outpatient. Thank you for allowing us to participate in the care of this patient. Please call with questions.     Rayshawn Gotti MD  Pager: [phone] - Office: [phone]

## 2020-04-13 NOTE — CARE COORDINATION
Case Management Initial Discharge Plan  Robby Leon,             Talked  With:patient on the phone to discuss discharge plans. Information verified: address, contacts, phone number, , insurance Yes [place]ola Hernandez. [id number], [phone]  PCP: No primary care provider on file. Insurance Provider: [employer]    Discharge Planning    Living Arrangements:  Family Members   Support Systems:  Family Members    Home has 2 stories   15stairs to climb to reach second floor  Location of bedroom/bathroom in home upstairs    Patient able to perform ADL's:Independent    Current Services (outpatient & in home) none  DME equipment: none  DME provider: n/a      Potential Assistance Needed:  N/A    Patient agreeable to home care: No  [place] of choice provided:  n/a    Prior SNF/Rehab Placement and Facility: N/A  Agreeable to SNF/Rehab: No  [place] of choice provided: n/a   Evaluation: n/a    Expected Discharge date:  20  Patient expects to be discharged to:  home  Follow Up Appointment: Best Day/ Time: Monday AM    Transportation provider: cristian  Transportation arrangements needed for discharge: no    Readmission Risk              Risk of Unplanned Readmission:        15             Does patient have a readmission risk score greater than 14?: Yes  If yes, follow-up appointment must be made within 7 days of discharge.      Goals of Care: to be COVID-      Discharge Plan: home with nephew    [facility] [address]          Electronically signed by Mina Pop RN on 20 at 10:14 AM EDT

## 2020-04-13 NOTE — ED NOTES
Pt vomited into mask, appears clear/sputum like. Patient has no signs or symptoms of acute distress at this time, remains on continuous telemetry and pulse ox monitoring.       Eldon Guillaume RN  04/12/20 2012

## 2020-04-13 NOTE — H&P
[facility]    HISTORY AND PHYSICAL EXAMINATION            Date:   4/12/2020  Patient name:  Zulay Mathias  Date of admission:  4/12/2020  2:06 PM  MRN:   [id number]  Account:  [de-identified]  YOB: 1965  PCP:    No primary care provider on file. Room:   46PED/46PED  Code Status:    No Order    Chief Complaint:     Chief Complaint   Patient presents with    Abdominal Pain    Chest Pain   hematemesis    History Obtained From:     patient, electronic medical record, Quality of history:  poor historian    History of Present Illness:     Zulay Mathias is a 54 y.o. Non-/non  male who presents with Abdominal Pain and Chest Pain   and is admitted to the hospital for the management of acute GI bleed, chest pain, Covid R/O    This is a 49-year-old withdrawn male who was brought to the emergency room via EMS for a multitude of complaints that when asked become vague. Patient with abdominal pain nausea vomiting shortness of breath and chest tightness/pain. Patient states that the chest tightness started about a week ago and has been constant and he has had a cough intermittently which has progressively worsened and now he is having emesis which is bloody. Other questioning was on answered as he just reports \"I do not know\", however in the review of the chart it was noted that he was [place] drinking\" last night and when I review his social history he says he drinks but does not elaborate how much how often and does not report if he has had withdrawal.  Other social history was not reported even though asked. He denies medical history, he denies surgical history. He gives a very vague family history    Upon my evaluation of the patient he was on room air with out hypoxia. He was coughing and developed a large clear emesis around the mask, concerns me if he may have aspirated any of the emesis contents.       Past Medical Investigations:      Laboratory Testing:  Recent Results (from the past 24 hour(s))   TYPE AND SCREEN    Collection Time: 04/12/20  2:33 PM   Result Value Ref Range    Expiration Date 04/15/2020,2359     Arm Band Number [id number]     ABO/Rh AB POSITIVE     Antibody Screen NEGATIVE    CBC Auto Differential    Collection Time: 04/12/20  2:34 PM   Result Value Ref Range    WBC 8.2 3.5 - 11.3 k/uL    RBC 5.34 4.21 - 5.77 m/uL    Hemoglobin 16.3 13.0 - 17.0 g/dL    Hematocrit 50.9 (H) 40.7 - 50.3 %    MCV 95.3 82.6 - 102.9 fL    MCH 30.5 25.2 - 33.5 pg    MCHC 32.0 28.4 - 34.8 g/dL    RDW 13.5 11.8 - 14.4 %    Platelets 978 168 - 456 k/uL    MPV 9.8 8.1 - 13.5 fL    NRBC Automated 0.0 0.0 per 100 WBC    Differential Type NOT REPORTED     Seg Neutrophils 68 (H) 36 - 65 %    Lymphocytes 23 (L) 24 - 43 %    Monocytes 9 3 - 12 %    Eosinophils % 0 (L) 1 - 4 %    Basophils 0 0 - 2 %    Immature Granulocytes 0 0 %    Segs Absolute 5.43 1.50 - 8.10 k/uL    Absolute Lymph # 1.90 1.10 - 3.70 k/uL    Absolute Mono # 0.77 0.10 - 1.20 k/uL    Absolute Eos # 0.03 0.00 - 0.44 k/uL    Basophils Absolute <0.03 0.00 - 0.20 k/uL    Absolute Immature Granulocyte <0.03 0.00 - 0.30 k/uL    WBC Morphology NOT REPORTED     RBC Morphology NOT REPORTED     Platelet Estimate NOT REPORTED    Brain Natriuretic Peptide    Collection Time: 04/12/20  2:34 PM   Result Value Ref Range    Pro-BNP 49 <300 pg/mL    BNP Interpretation Pro-BNP Reference Range:    Troponin    Collection Time: 04/12/20  2:34 PM   Result Value Ref Range    Troponin, High Sensitivity 16 0 - 22 ng/L    Troponin T NOT REPORTED <0.03 ng/mL    Troponin Interp NOT REPORTED    Comprehensive Metabolic Panel    Collection Time: 04/12/20  2:34 PM   Result Value Ref Range    Glucose 98 70 - 99 mg/dL    BUN 8 6 - 20 mg/dL    CREATININE 1.18 0.70 - 1.20 mg/dL    Bun/Cre Ratio NOT REPORTED 9 - 20    Calcium 10.0 8.6 - 10.4 mg/dL    Sodium 134 (L) 135 - 144 mmol/L    Potassium 4.7 3.7 REPORTED 0 /HPF    Bacteria, UA NOT REPORTED None    Mucus, UA NOT REPORTED None    Trichomonas, UA NOT REPORTED None    Amorphous, UA NOT REPORTED None    Other Observations UA NOT REPORTED NOT REQ. Yeast, UA NOT REPORTED None       Imaging/Diagnostics:  Xr Chest Portable    Result Date: 4/12/2020  No acute cardiopulmonary process. Cta Chest Abdomen Pelvis W Contrast    Result Date: 4/12/2020  No acute traumatic vascular abnormality in the chest, abdomen or pelvis. Left 4th rib fracture without pneumothorax. A small amount of adjacent lobular intermediate density along the pleural surface is noted suggesting edema or hemorrhage along the pleura. Wall thickening in the distal 3rd of the esophagus. Fatty infiltration of the liver Probable flash filling hemangioma in the posterior right lobe of the liver. This was present on old exam. Age-indeterminate wall thickening of the urinary bladder suggesting age-indeterminate cystitis No acute abnormality in the chest, abdomen or pelvis otherwise. Assessment :      Hospital Problems           Last Modified POA    * (Principal) SIRS (systemic inflammatory response syndrome) ([facility] [address] 4/12/2020 Yes    Gastrointestinal hemorrhage with hematemesis 4/12/2020 Yes    Acute dehydration 4/12/2020 Yes    Suspected COVID-19 virus infection 4/12/2020 Yes    Other chest pain 4/12/2020 Yes    Transaminasemia 4/12/2020 Yes    ETOH abuse 4/12/2020 Yes    Hematuria 4/12/2020 Yes    Cystitis 4/12/2020 Yes          Plan:     Patient status inpatient in the Progressive Unit/Step down    1. Acute upper GI bleed-noted coffee-ground emesis on exam.  CTA showed Wall thickening in the distal 3rd of the esophagus patient is on Protonix drip/octreotide drip  at this time GI has been consulted. Hemoglobin is stable will continue to trend and recheck q6  2. SIRS/ Lactic acidosis-assess for causes continue to trend and treat with aggressive IV hydration started on antibiotics for Sirs.   Blood pressure is stable heart rate is stable oxygen 89% on room air and placed on supplemental oxygen. Initial lactic 5.8 repeat lactic 2.2, this could be in [place] to the acute dehydration  3. Chest pain-continue to trend troponin no aspirin, Lovenox, anticoagulants platelets at this time given #1  4. Transaminase-continue to trend levels he was binge drinking last night he is unclear on his alcohol intake. 5. Acute dehydration /hyponatremia-most likely secondary to the nausea and vomiting. Supportive treatment with IV fluids. Monitor intake and output. 6. Suspect coded 23- rule out in progress-cough, chest pain, abdominal pain(even though we believe the source is GI bleeding). However with his frequent coughing and emesis will repeat chest x-ray in the morning after hydration to see if there is anything that develops and or if there was aspiration on any emesis as he had a mask on. However will proceed with checking LD, ferritin, respiratory PCR, and place him precautions  7. Alcohol use/possible abuse-reports binge drinking-we will monitor C1 no Ativan prescribed at this time. 8. Cystitis with hematuria-cystitis is undetermined and found on the CTA, the bladder wall thickening is concerning, will assess the UA but also add on urine culture for chlamydia chlamydia gonorrhea trichomonas. If negative need to follow-up with urology in an outpatient basis for further follow-up of hematuria. Consultations:   IP CONSULT TO GI  IP CONSULT TO HOSPITALIST    Patient is admitted as inpatient status because of co-morbidities listed above, severity of signs and symptoms as outlined, requirement for current medical therapies and most importantly because of direct risk to patient if care not provided in a hospital setting. Rey Skinnre, KARY - [place]  4/12/2020  9:45 PM    Copy sent to Dr. Juares primary care provider on file.

## 2020-04-13 NOTE — ED PROVIDER NOTES
[address] [facility]  Emergency Department  Emergency Medicine Resident Sign-out   Care of Sky Ramsay was assumed from Dr. Anay Holcomb and is being seen for Abdominal Pain and Chest Pain  . The patient's initial evaluation and plan have been discussed with the prior provider who initially evaluated the patient.      EMERGENCY DEPARTMENT COURSE / MEDICAL DECISION MAKING:       MEDICATIONS GIVEN:  Orders Placed This Encounter   Medications    ondansetron (ZOFRAN) injection 4 mg    0.9 % sodium chloride bolus    pantoprazole (PROTONIX) 80 mg in sodium chloride 0.9 % 50 mL bolus    pantoprazole (PROTONIX) 80 mg in sodium chloride 0.9 % 100 mL infusion    cefTRIAXone (ROCEPHIN) 2 g IVPB in D5W 50ml minibag    octreotide (SANDOSTATIN) 500 mcg in sodium chloride 0.9 % 100 mL infusion    metoclopramide (REGLAN) injection 10 mg    diphenhydrAMINE (BENADRYL) injection 25 mg    iohexol (OMNIPAQUE 350) solution 100 mL       LABS / RADIOLOGY:     Labs Reviewed   CBC WITH AUTO DIFFERENTIAL - Abnormal; Notable for the following components:       Result Value    Hematocrit 50.9 (*)     Seg Neutrophils 68 (*)     Lymphocytes 23 (*)     Eosinophils % 0 (*)     All other components within normal limits   COMPREHENSIVE METABOLIC PANEL - Abnormal; Notable for the following components:    Sodium 134 (*)     Chloride 96 (*)     CO2 19 (*)     Anion Gap 19 (*)      (*)     AST 99 (*)     Total Protein 9.5 (*)     Albumin/Globulin Ratio 0.9 (*)     All other components within normal limits   LACTATE, SEPSIS - Abnormal; Notable for the following components:    Lactic Acid, Sepsis, Whole Blood 5.8 (*)     All other components within normal limits   LACTATE, SEPSIS - Abnormal; Notable for the following components:    Lactic Acid, Sepsis, Whole Blood 2.2 (*)     All other components within normal limits   URINALYSIS WITH MICROSCOPIC - Abnormal; Notable for the following components:    Ketones, Urine SMALL (*) comfortably, no longer vomiting. Coffee-ground emesis in vomit basin. Plan is to admit patient at this time with GI consult. SIRELOISE+    [BJ]   [phone] Spoke with Dr. Carson Corona of GI who has agreed to see the patient in the morning. Given patient is hemodynamically stable, has stopped vomiting, and hemoglobin is not low, I believe this is a reasonable plan at this time. Patient to remain NPO after midnight per Dr. Carson Corona.    [BJ]   7239 Patient has been accepted for admission by [facility]    [BJ]   1904 I asked that patient if he has had any recent falls or traumas; he said no. Therefore I conclude that the rib fx seen on CT must be an old fx. [BJ]      ED Course User Index  [BJ] Rick Zabala DO       OUTSTANDING TASKS / RECOMMENDATIONS:    1. Awaiting bed     FINAL IMPRESSION:     1. Gastrointestinal hemorrhage, unspecified gastrointestinal hemorrhage type    2. Transaminitis        DISPOSITION:         DISPOSITION:  []  Discharge   []  Transfer -    [x]  Admission -     []  Against Medical Advice   []  Eloped   FOLLOW-UP: No follow-up provider specified.    DISCHARGE MEDICATIONS: New Prescriptions    No medications on file          Katia Caruso DO  Emergency Medicine Resident  [facility]     [facility]  Resident  04/12/20 [id number]

## 2020-04-14 VITALS
HEART RATE: 83 BPM | DIASTOLIC BLOOD PRESSURE: 94 MMHG | BODY MASS INDEX: 29.68 KG/M2 | WEIGHT: 251.32 LBS | HEIGHT: 77 IN | TEMPERATURE: 98.6 F | RESPIRATION RATE: 15 BRPM | SYSTOLIC BLOOD PRESSURE: 127 MMHG | OXYGEN SATURATION: 96 %

## 2020-04-14 PROBLEM — K92.2 GI BLEED: Status: ACTIVE | Noted: 2020-04-14

## 2020-04-14 LAB
EKG ATRIAL RATE: 82 BPM
EKG P AXIS: 51 DEGREES
EKG P-R INTERVAL: 186 MS
EKG Q-T INTERVAL: 370 MS
EKG QRS DURATION: 68 MS
EKG QTC CALCULATION (BAZETT): 432 MS
EKG R AXIS: 20 DEGREES
EKG T AXIS: 49 DEGREES
EKG VENTRICULAR RATE: 82 BPM
HCT VFR BLD CALC: 47 % (ref 40.7–50.3)
HEMOGLOBIN: 14.9 G/DL (ref 13–17)

## 2020-04-14 PROCEDURE — 85014 HEMATOCRIT: CPT

## 2020-04-14 PROCEDURE — G0378 HOSPITAL OBSERVATION PER HR: HCPCS

## 2020-04-14 PROCEDURE — 94761 N-INVAS EAR/PLS OXIMETRY MLT: CPT

## 2020-04-14 PROCEDURE — 6370000000 HC RX 637 (ALT 250 FOR IP): Performed by: INTERNAL MEDICINE

## 2020-04-14 PROCEDURE — 99239 HOSP IP/OBS DSCHRG MGMT >30: CPT | Performed by: INTERNAL MEDICINE

## 2020-04-14 PROCEDURE — 93010 ELECTROCARDIOGRAM REPORT: CPT | Performed by: INTERNAL MEDICINE

## 2020-04-14 PROCEDURE — 36415 COLL VENOUS BLD VENIPUNCTURE: CPT

## 2020-04-14 PROCEDURE — 99232 SBSQ HOSP IP/OBS MODERATE 35: CPT | Performed by: INTERNAL MEDICINE

## 2020-04-14 PROCEDURE — 85018 HEMOGLOBIN: CPT

## 2020-04-14 PROCEDURE — 6370000000 HC RX 637 (ALT 250 FOR IP): Performed by: NURSE PRACTITIONER

## 2020-04-14 RX ORDER — PANTOPRAZOLE SODIUM 40 MG/1
40 TABLET, DELAYED RELEASE ORAL
Qty: 30 TABLET | Refills: 3 | Status: SHIPPED | OUTPATIENT
Start: 2020-04-14 | End: 2021-03-09

## 2020-04-14 RX ORDER — PANTOPRAZOLE SODIUM 40 MG/1
40 TABLET, DELAYED RELEASE ORAL
Status: DISCONTINUED | OUTPATIENT
Start: 2020-04-14 | End: 2020-04-14 | Stop reason: HOSPADM

## 2020-04-14 RX ORDER — SUCRALFATE 1 G/1
1 TABLET ORAL ONCE
Status: COMPLETED | OUTPATIENT
Start: 2020-04-14 | End: 2020-04-14

## 2020-04-14 RX ADMIN — PANTOPRAZOLE SODIUM 40 MG: 40 TABLET, DELAYED RELEASE ORAL at 06:52

## 2020-04-14 RX ADMIN — SUCRALFATE 1 G: 1 TABLET ORAL at 12:16

## 2020-04-14 NOTE — PLAN OF CARE
Pt remains free from falls at this time. Floor free from obstacles, and bed is locked and in lowest position. Adequate lighting provided. Call light within reach; pt encouraged to call before getting OOB for any need. Pt's pain assessed frequently with hourly rounding; assessed all pain characteristics including level, type, location, frequency, and onset. Pt medicated by RN per PRN orders. Non-pharmacologic interventions offered to pt as well. Pt states pain is tolerable at this time. Full skin assessment completed this shift. No new skin breakdown at this time. Pt remains free from accidental physical injury at this time. Pt assessed for risk for falls, fall precautions in place. Safe environment maintained. Bed locked and in lowest position, call light within reach. ID band correct and in place. Pt educated on safe transfer methods, maintains steady gait during independent ambulation. Pt instructed on safety devices, voiced complete understanding. Will continue to monitor.   Electronically signed by Freddie Cool RN on 4/14/2020 at 12:55 AM

## 2020-04-14 NOTE — CARE COORDINATION
Discharge 1 [facility] Case Management Department  Written by: Jorie Gitelman, RN    Patient Name: Marsha Alfaro  Attending Provider: Dylon Alexander MD  Admit Date: 2020  2:06 PM  MRN: [id number]  Account: [de-identified]                     : 1965  Discharge Date:  20       Disposition: home    Jorie Gitelman, RN

## 2020-04-15 ENCOUNTER — CARE COORDINATION (OUTPATIENT)
Dept: CARE COORDINATION | Age: 55
End: 2020-04-15

## 2021-03-09 ENCOUNTER — APPOINTMENT (OUTPATIENT)
Dept: GENERAL RADIOLOGY | Age: 56
DRG: 720 | End: 2021-03-09
Payer: MEDICAID

## 2021-03-09 ENCOUNTER — HOSPITAL ENCOUNTER (INPATIENT)
Age: 56
LOS: 6 days | Discharge: HOME | DRG: 720 | End: 2021-03-15
Attending: EMERGENCY MEDICINE | Admitting: INTERNAL MEDICINE
Payer: MEDICAID

## 2021-03-09 ENCOUNTER — APPOINTMENT (OUTPATIENT)
Dept: CT IMAGING | Age: 56
DRG: 720 | End: 2021-03-09
Payer: MEDICAID

## 2021-03-09 DIAGNOSIS — R09.02 HYPOXIA: Primary | ICD-10-CM

## 2021-03-09 DIAGNOSIS — R53.83 FATIGUE, UNSPECIFIED TYPE: ICD-10-CM

## 2021-03-09 DIAGNOSIS — R53.1 GENERALIZED WEAKNESS: ICD-10-CM

## 2021-03-09 DIAGNOSIS — M62.82 NON-TRAUMATIC RHABDOMYOLYSIS: ICD-10-CM

## 2021-03-09 DIAGNOSIS — D70.3 NEUTROPENIA ASSOCIATED WITH INFECTION (HCC): ICD-10-CM

## 2021-03-09 PROBLEM — A41.9 SEPSIS (HCC): Status: ACTIVE | Noted: 2021-03-09

## 2021-03-09 LAB
ABSOLUTE EOS #: 0 K/UL (ref 0–0.4)
ABSOLUTE IMMATURE GRANULOCYTE: 0.03 K/UL (ref 0–0.3)
ABSOLUTE LYMPH #: 0.54 K/UL (ref 1–4.8)
ABSOLUTE MONO #: 0.17 K/UL (ref 0.1–0.8)
ALBUMIN SERPL-MCNC: 3.4 G/DL (ref 3.5–5.2)
ALBUMIN/GLOBULIN RATIO: 0.9 (ref 1–2.5)
ALP BLD-CCNC: 56 U/L (ref 40–129)
ALT SERPL-CCNC: 29 U/L (ref 5–41)
ANION GAP SERPL CALCULATED.3IONS-SCNC: 11 MMOL/L (ref 9–17)
AST SERPL-CCNC: 110 U/L
ATYPICAL LYMPHOCYTE ABSOLUTE COUNT: 0.06 K/UL
ATYPICAL LYMPHOCYTES: 4 %
BASOPHILS # BLD: 0 % (ref 0–2)
BASOPHILS ABSOLUTE: 0 K/UL (ref 0–0.2)
BILIRUB SERPL-MCNC: 0.35 MG/DL (ref 0.3–1.2)
BNP INTERPRETATION: NORMAL
BUN BLDV-MCNC: 12 MG/DL (ref 6–20)
BUN/CREAT BLD: ABNORMAL (ref 9–20)
C-REACTIVE PROTEIN: 36.5 MG/L (ref 0–5)
CALCIUM SERPL-MCNC: 7.5 MG/DL (ref 8.6–10.4)
CHLORIDE BLD-SCNC: 96 MMOL/L (ref 98–107)
CO2: 23 MMOL/L (ref 20–31)
CREAT SERPL-MCNC: 1.35 MG/DL (ref 0.7–1.2)
D-DIMER QUANTITATIVE: 12.69 MG/L FEU
DIFFERENTIAL TYPE: ABNORMAL
EOSINOPHILS RELATIVE PERCENT: 0 % (ref 1–4)
FERRITIN: 2379 UG/L (ref 30–400)
FIBRINOGEN: 273 MG/DL (ref 140–420)
GFR AFRICAN AMERICAN: >60 ML/MIN
GFR NON-AFRICAN AMERICAN: 55 ML/MIN
GFR SERPL CREATININE-BSD FRML MDRD: ABNORMAL ML/MIN/{1.73_M2}
GFR SERPL CREATININE-BSD FRML MDRD: ABNORMAL ML/MIN/{1.73_M2}
GLUCOSE BLD-MCNC: 101 MG/DL (ref 70–99)
HCT VFR BLD CALC: 43.3 % (ref 40.7–50.3)
HEMOGLOBIN: 14.1 G/DL (ref 13–17)
IMMATURE GRANULOCYTES: 2 %
INR BLD: 1
LACTATE DEHYDROGENASE: 594 U/L (ref 135–225)
LACTIC ACID, WHOLE BLOOD: 1.4 MMOL/L (ref 0.7–2.1)
LACTIC ACID: NORMAL MMOL/L
LIPASE: 125 U/L (ref 13–60)
LYMPHOCYTES # BLD: 36 % (ref 24–44)
MAGNESIUM: 1.9 MG/DL (ref 1.6–2.6)
MCH RBC QN AUTO: 28.7 PG (ref 25.2–33.5)
MCHC RBC AUTO-ENTMCNC: 32.6 G/DL (ref 28.4–34.8)
MCV RBC AUTO: 88.2 FL (ref 82.6–102.9)
MONOCYTES # BLD: 11 % (ref 1–7)
MORPHOLOGY: NORMAL
MYOGLOBIN: 538 NG/ML (ref 28–72)
NRBC AUTOMATED: 0 PER 100 WBC
PARTIAL THROMBOPLASTIN TIME: 30.1 SEC (ref 20.5–30.5)
PDW BLD-RTO: 14 % (ref 11.8–14.4)
PLATELET # BLD: ABNORMAL K/UL (ref 138–453)
PLATELET ESTIMATE: ABNORMAL
PLATELET, FLUORESCENCE: 118 K/UL (ref 138–453)
PLATELET, IMMATURE FRACTION: 7.1 % (ref 1.1–10.3)
PMV BLD AUTO: ABNORMAL FL (ref 8.1–13.5)
POTASSIUM SERPL-SCNC: 3.6 MMOL/L (ref 3.7–5.3)
PRO-BNP: 47 PG/ML
PROCALCITONIN: 0.18 NG/ML
PROTHROMBIN TIME: 11 SEC (ref 9.1–12.3)
RBC # BLD: 4.91 M/UL (ref 4.21–5.77)
RBC # BLD: ABNORMAL 10*6/UL
SARS-COV-2, RAPID: NOT DETECTED
SEG NEUTROPHILS: 47 % (ref 36–66)
SEGMENTED NEUTROPHILS ABSOLUTE COUNT: 0.7 K/UL (ref 1.8–7.7)
SODIUM BLD-SCNC: 130 MMOL/L (ref 135–144)
SPECIMEN DESCRIPTION: NORMAL
TOTAL CK: 2067 U/L (ref 39–308)
TOTAL PROTEIN: 7 G/DL (ref 6.4–8.3)
TROPONIN INTERP: NORMAL
TROPONIN T: NORMAL NG/ML
TROPONIN, HIGH SENSITIVITY: 17 NG/L (ref 0–22)
VITAMIN D 25-HYDROXY: 9.9 NG/ML (ref 30–100)
WBC # BLD: 1.5 K/UL (ref 3.5–11.3)
WBC # BLD: ABNORMAL 10*3/UL

## 2021-03-09 PROCEDURE — 85055 RETICULATED PLATELET ASSAY: CPT

## 2021-03-09 PROCEDURE — 71260 CT THORAX DX C+: CPT

## 2021-03-09 PROCEDURE — 96368 THER/DIAG CONCURRENT INF: CPT

## 2021-03-09 PROCEDURE — 85025 COMPLETE CBC W/AUTO DIFF WBC: CPT

## 2021-03-09 PROCEDURE — 85379 FIBRIN DEGRADATION QUANT: CPT

## 2021-03-09 PROCEDURE — 99285 EMERGENCY DEPT VISIT HI MDM: CPT

## 2021-03-09 PROCEDURE — 87040 BLOOD CULTURE FOR BACTERIA: CPT

## 2021-03-09 PROCEDURE — 80053 COMPREHEN METABOLIC PANEL: CPT

## 2021-03-09 PROCEDURE — U0002 COVID-19 LAB TEST NON-CDC: HCPCS

## 2021-03-09 PROCEDURE — 82306 VITAMIN D 25 HYDROXY: CPT

## 2021-03-09 PROCEDURE — 85730 THROMBOPLASTIN TIME PARTIAL: CPT

## 2021-03-09 PROCEDURE — 84145 PROCALCITONIN (PCT): CPT

## 2021-03-09 PROCEDURE — 83605 ASSAY OF LACTIC ACID: CPT

## 2021-03-09 PROCEDURE — 83874 ASSAY OF MYOGLOBIN: CPT

## 2021-03-09 PROCEDURE — 71045 X-RAY EXAM CHEST 1 VIEW: CPT

## 2021-03-09 PROCEDURE — 96367 TX/PROPH/DG ADDL SEQ IV INF: CPT

## 2021-03-09 PROCEDURE — 2500000003 HC RX 250 WO HCPCS: Performed by: STUDENT IN AN ORGANIZED HEALTH CARE EDUCATION/TRAINING PROGRAM

## 2021-03-09 PROCEDURE — 93005 ELECTROCARDIOGRAM TRACING: CPT

## 2021-03-09 PROCEDURE — 2060000000 HC ICU INTERMEDIATE R&B

## 2021-03-09 PROCEDURE — 83880 ASSAY OF NATRIURETIC PEPTIDE: CPT

## 2021-03-09 PROCEDURE — 6370000000 HC RX 637 (ALT 250 FOR IP): Performed by: STUDENT IN AN ORGANIZED HEALTH CARE EDUCATION/TRAINING PROGRAM

## 2021-03-09 PROCEDURE — 96365 THER/PROPH/DIAG IV INF INIT: CPT

## 2021-03-09 PROCEDURE — 96375 TX/PRO/DX INJ NEW DRUG ADDON: CPT

## 2021-03-09 PROCEDURE — 85610 PROTHROMBIN TIME: CPT

## 2021-03-09 PROCEDURE — 84484 ASSAY OF TROPONIN QUANT: CPT

## 2021-03-09 PROCEDURE — 36415 COLL VENOUS BLD VENIPUNCTURE: CPT

## 2021-03-09 PROCEDURE — 82550 ASSAY OF CK (CPK): CPT

## 2021-03-09 PROCEDURE — 83615 LACTATE (LD) (LDH) ENZYME: CPT

## 2021-03-09 PROCEDURE — 6360000004 HC RX CONTRAST MEDICATION: Performed by: STUDENT IN AN ORGANIZED HEALTH CARE EDUCATION/TRAINING PROGRAM

## 2021-03-09 PROCEDURE — 82728 ASSAY OF FERRITIN: CPT

## 2021-03-09 PROCEDURE — 6360000002 HC RX W HCPCS: Performed by: STUDENT IN AN ORGANIZED HEALTH CARE EDUCATION/TRAINING PROGRAM

## 2021-03-09 PROCEDURE — 83690 ASSAY OF LIPASE: CPT

## 2021-03-09 PROCEDURE — 96361 HYDRATE IV INFUSION ADD-ON: CPT

## 2021-03-09 PROCEDURE — 83735 ASSAY OF MAGNESIUM: CPT

## 2021-03-09 PROCEDURE — 85384 FIBRINOGEN ACTIVITY: CPT

## 2021-03-09 PROCEDURE — 2580000003 HC RX 258: Performed by: STUDENT IN AN ORGANIZED HEALTH CARE EDUCATION/TRAINING PROGRAM

## 2021-03-09 PROCEDURE — 86140 C-REACTIVE PROTEIN: CPT

## 2021-03-09 RX ORDER — DEXAMETHASONE SODIUM PHOSPHATE 10 MG/ML
10 INJECTION INTRAMUSCULAR; INTRAVENOUS ONCE
Status: COMPLETED | OUTPATIENT
Start: 2021-03-09 | End: 2021-03-09

## 2021-03-09 RX ORDER — CALCIUM GLUCONATE 20 MG/ML
1000 INJECTION, SOLUTION INTRAVENOUS ONCE
Status: COMPLETED | OUTPATIENT
Start: 2021-03-09 | End: 2021-03-09

## 2021-03-09 RX ORDER — 0.9 % SODIUM CHLORIDE 0.9 %
1000 INTRAVENOUS SOLUTION INTRAVENOUS ONCE
Status: COMPLETED | OUTPATIENT
Start: 2021-03-09 | End: 2021-03-09

## 2021-03-09 RX ORDER — ACETAMINOPHEN 500 MG
1000 TABLET ORAL ONCE
Status: COMPLETED | OUTPATIENT
Start: 2021-03-09 | End: 2021-03-09

## 2021-03-09 RX ADMIN — CALCIUM GLUCONATE 1000 MG: 20 INJECTION, SOLUTION INTRAVENOUS at 21:32

## 2021-03-09 RX ADMIN — VANCOMYCIN HYDROCHLORIDE 2500 MG: 1 INJECTION, POWDER, LYOPHILIZED, FOR SOLUTION INTRAVENOUS at 22:45

## 2021-03-09 RX ADMIN — DEXAMETHASONE SODIUM PHOSPHATE 10 MG: 10 INJECTION INTRAMUSCULAR; INTRAVENOUS at 21:30

## 2021-03-09 RX ADMIN — IOPAMIDOL 75 ML: 755 INJECTION, SOLUTION INTRAVENOUS at 22:10

## 2021-03-09 RX ADMIN — ACETAMINOPHEN 1000 MG: 500 TABLET ORAL at 20:27

## 2021-03-09 RX ADMIN — SODIUM CHLORIDE 1000 ML: 9 INJECTION, SOLUTION INTRAVENOUS at 21:39

## 2021-03-09 RX ADMIN — SODIUM CHLORIDE 1000 ML: 9 INJECTION, SOLUTION INTRAVENOUS at 21:30

## 2021-03-09 RX ADMIN — PIPERACILLIN AND TAZOBACTAM 4500 MG: 4; .5 INJECTION, POWDER, LYOPHILIZED, FOR SOLUTION INTRAVENOUS; PARENTERAL at 21:33

## 2021-03-09 ASSESSMENT — ENCOUNTER SYMPTOMS
COUGH: 1
SORE THROAT: 0
VOMITING: 0
RHINORRHEA: 0
ABDOMINAL PAIN: 0
SHORTNESS OF BREATH: 1
PHOTOPHOBIA: 0
NAUSEA: 0

## 2021-03-09 NOTE — Clinical Note
Patient Class: Inpatient [101]   REQUIRED: Diagnosis: Sepsis ([facility] [address] [[id number]]   Estimated Length of Stay: Estimated stay of more than 2 midnights   Admitting Provider: [place] Reason [[id number]]   [facility]   Tele[place] Bed Required?: Yes

## 2021-03-10 ENCOUNTER — APPOINTMENT (OUTPATIENT)
Dept: MRI IMAGING | Age: 56
DRG: 720 | End: 2021-03-10
Payer: MEDICAID

## 2021-03-10 PROBLEM — E04.1 LEFT THYROID NODULE: Status: ACTIVE | Noted: 2021-03-10

## 2021-03-10 PROBLEM — N17.9 AKI (ACUTE KIDNEY INJURY) (HCC): Status: ACTIVE | Noted: 2021-03-10

## 2021-03-10 PROBLEM — D72.819 LEUKOPENIA: Status: ACTIVE | Noted: 2021-03-10

## 2021-03-10 PROBLEM — E55.9 VITAMIN D DEFICIENCY: Status: ACTIVE | Noted: 2021-03-10

## 2021-03-10 PROBLEM — E83.51 HYPOCALCEMIA: Status: ACTIVE | Noted: 2021-03-10

## 2021-03-10 PROBLEM — E87.6 HYPOKALEMIA: Status: ACTIVE | Noted: 2021-03-10

## 2021-03-10 PROBLEM — E87.1 HYPONATREMIA: Status: ACTIVE | Noted: 2021-03-10

## 2021-03-10 PROBLEM — E88.09 HYPOALBUMINEMIA: Status: ACTIVE | Noted: 2021-03-10

## 2021-03-10 PROBLEM — D69.6 THROMBOCYTOPENIA (HCC): Status: ACTIVE | Noted: 2021-03-10

## 2021-03-10 LAB
-: ABNORMAL
-: NORMAL
ADENOVIRUS PCR: NOT DETECTED
ALBUMIN SERPL-MCNC: 2.7 G/DL (ref 3.5–5.2)
ALBUMIN SERPL-MCNC: NORMAL G/DL (ref 3.5–5.2)
ALBUMIN/GLOBULIN RATIO: 0.8 (ref 1–2.5)
ALBUMIN/GLOBULIN RATIO: NORMAL (ref 1–2.5)
ALP BLD-CCNC: 45 U/L (ref 40–129)
ALP BLD-CCNC: NORMAL U/L (ref 40–129)
ALT SERPL-CCNC: 27 U/L (ref 5–41)
ALT SERPL-CCNC: NORMAL U/L (ref 5–41)
AMORPHOUS: ABNORMAL
ANION GAP SERPL CALCULATED.3IONS-SCNC: 11 MMOL/L (ref 9–17)
ANION GAP SERPL CALCULATED.3IONS-SCNC: NORMAL MMOL/L (ref 9–17)
AST SERPL-CCNC: 106 U/L
AST SERPL-CCNC: NORMAL U/L
BACTERIA: ABNORMAL
BILIRUB SERPL-MCNC: 0.25 MG/DL (ref 0.3–1.2)
BILIRUB SERPL-MCNC: NORMAL MG/DL (ref 0.3–1.2)
BILIRUBIN URINE: NEGATIVE
BORDETELLA PARAPERTUSSIS: NOT DETECTED
BORDETELLA PERTUSSIS PCR: NOT DETECTED
BUN BLDV-MCNC: 12 MG/DL (ref 6–20)
BUN BLDV-MCNC: NORMAL MG/DL (ref 6–20)
BUN/CREAT BLD: ABNORMAL (ref 9–20)
BUN/CREAT BLD: NORMAL (ref 9–20)
C-REACTIVE PROTEIN: 17 MG/L (ref 0–5)
C-REACTIVE PROTEIN: 30.1 MG/L (ref 0–5)
CALCIUM IONIZED: 0.88 MMOL/L (ref 1.13–1.33)
CALCIUM SERPL-MCNC: 7 MG/DL (ref 8.6–10.4)
CALCIUM SERPL-MCNC: NORMAL MG/DL (ref 8.6–10.4)
CASTS UA: ABNORMAL /LPF (ref 0–8)
CHLAMYDIA PNEUMONIAE BY PCR: NOT DETECTED
CHLORIDE BLD-SCNC: 102 MMOL/L (ref 98–107)
CHLORIDE BLD-SCNC: NORMAL MMOL/L (ref 98–107)
CO2: 23 MMOL/L (ref 20–31)
CO2: NORMAL MMOL/L (ref 20–31)
COLOR: YELLOW
CORONAVIRUS 229E PCR: NOT DETECTED
CORONAVIRUS HKU1 PCR: NOT DETECTED
CORONAVIRUS NL63 PCR: NOT DETECTED
CORONAVIRUS OC43 PCR: NOT DETECTED
CREAT SERPL-MCNC: 1.06 MG/DL (ref 0.7–1.2)
CREAT SERPL-MCNC: NORMAL MG/DL (ref 0.7–1.2)
CRYSTALS, UA: ABNORMAL /HPF
CULTURE: NO GROWTH
D-DIMER QUANTITATIVE: 6.71 MG/L FEU
EPITHELIAL CELLS UA: ABNORMAL /HPF (ref 0–5)
FERRITIN: 1804 UG/L (ref 30–400)
FIBRINOGEN: 235 MG/DL (ref 140–420)
GFR AFRICAN AMERICAN: >60 ML/MIN
GFR AFRICAN AMERICAN: NORMAL ML/MIN
GFR NON-AFRICAN AMERICAN: >60 ML/MIN
GFR NON-AFRICAN AMERICAN: NORMAL ML/MIN
GFR SERPL CREATININE-BSD FRML MDRD: ABNORMAL ML/MIN/{1.73_M2}
GFR SERPL CREATININE-BSD FRML MDRD: ABNORMAL ML/MIN/{1.73_M2}
GFR SERPL CREATININE-BSD FRML MDRD: NORMAL ML/MIN/{1.73_M2}
GFR SERPL CREATININE-BSD FRML MDRD: NORMAL ML/MIN/{1.73_M2}
GLUCOSE BLD-MCNC: 122 MG/DL (ref 70–99)
GLUCOSE BLD-MCNC: 153 MG/DL (ref 75–110)
GLUCOSE BLD-MCNC: NORMAL MG/DL (ref 70–99)
GLUCOSE URINE: NEGATIVE
HCT VFR BLD CALC: 47.5 % (ref 40.7–50.3)
HEMOGLOBIN: 15.1 G/DL (ref 13–17)
HUMAN METAPNEUMOVIRUS PCR: NOT DETECTED
INFLUENZA A BY PCR: NOT DETECTED
INFLUENZA A H1 (2009) PCR: NORMAL
INFLUENZA A H1 PCR: NORMAL
INFLUENZA A H3 PCR: NORMAL
INFLUENZA B BY PCR: NOT DETECTED
INR BLD: 1.1
KETONES, URINE: NEGATIVE
LACTATE DEHYDROGENASE: 677 U/L (ref 135–225)
LACTIC ACID, SEPSIS WHOLE BLOOD: 0.8 MMOL/L (ref 0.5–1.9)
LACTIC ACID, SEPSIS WHOLE BLOOD: 1 MMOL/L (ref 0.5–1.9)
LACTIC ACID, SEPSIS: NORMAL MMOL/L (ref 0.5–1.9)
LACTIC ACID, SEPSIS: NORMAL MMOL/L (ref 0.5–1.9)
LACTIC ACID, WHOLE BLOOD: 1.1 MMOL/L (ref 0.7–2.1)
LACTIC ACID: NORMAL MMOL/L
LEGIONELLA PNEUMOPHILIA AG, URINE: NEGATIVE
LEUKOCYTE ESTERASE, URINE: NEGATIVE
Lab: NORMAL
MAGNESIUM: 2 MG/DL (ref 1.6–2.6)
MCH RBC QN AUTO: 28.7 PG (ref 25.2–33.5)
MCHC RBC AUTO-ENTMCNC: 31.8 G/DL (ref 28.4–34.8)
MCV RBC AUTO: 90.1 FL (ref 82.6–102.9)
MUCUS: ABNORMAL
MYCOPLASMA PNEUMONIAE PCR: NOT DETECTED
NITRITE, URINE: NEGATIVE
NRBC AUTOMATED: 0 PER 100 WBC
OTHER OBSERVATIONS UA: ABNORMAL
PARAINFLUENZA 1 PCR: NOT DETECTED
PARAINFLUENZA 2 PCR: NOT DETECTED
PARAINFLUENZA 3 PCR: NOT DETECTED
PARAINFLUENZA 4 PCR: NOT DETECTED
PARTIAL THROMBOPLASTIN TIME: 32.6 SEC (ref 20.5–30.5)
PDW BLD-RTO: 14.3 % (ref 11.8–14.4)
PH UA: 5.5 (ref 5–8)
PHOSPHORUS: 4 MG/DL (ref 2.5–4.5)
PLATELET # BLD: ABNORMAL K/UL (ref 138–453)
PLATELET, FLUORESCENCE: 111 K/UL (ref 138–453)
PLATELET, IMMATURE FRACTION: 6.9 % (ref 1.1–10.3)
PMV BLD AUTO: ABNORMAL FL (ref 8.1–13.5)
POTASSIUM SERPL-SCNC: 4.9 MMOL/L (ref 3.7–5.3)
POTASSIUM SERPL-SCNC: NORMAL MMOL/L (ref 3.7–5.3)
PROCALCITONIN: 0.14 NG/ML
PROCALCITONIN: 0.15 NG/ML
PROCALCITONIN: 0.16 NG/ML
PROTEIN UA: ABNORMAL
PROTHROMBIN TIME: 11.3 SEC (ref 9.1–12.3)
RBC # BLD: 5.27 M/UL (ref 4.21–5.77)
RBC UA: ABNORMAL /HPF (ref 0–4)
REASON FOR REJECTION: NORMAL
RENAL EPITHELIAL, UA: ABNORMAL /HPF
RESP SYNCYTIAL VIRUS PCR: NOT DETECTED
RHINO/ENTEROVIRUS PCR: NOT DETECTED
SARS-COV-2, PCR: NOT DETECTED
SARS-COV-2: NORMAL
SARS-COV-2: NOT DETECTED
SODIUM BLD-SCNC: 136 MMOL/L (ref 135–144)
SODIUM BLD-SCNC: NORMAL MMOL/L (ref 135–144)
SOURCE: NORMAL
SPECIFIC GRAVITY UA: 1.04 (ref 1–1.03)
SPECIMEN DESCRIPTION: NORMAL
SPECIMEN DESCRIPTION: NORMAL
TOTAL PROTEIN: 6.3 G/DL (ref 6.4–8.3)
TOTAL PROTEIN: NORMAL G/DL (ref 6.4–8.3)
TRICHOMONAS: ABNORMAL
TROPONIN INTERP: NORMAL
TROPONIN T: NORMAL NG/ML
TROPONIN, HIGH SENSITIVITY: 11 NG/L (ref 0–22)
TURBIDITY: CLEAR
URINE HGB: ABNORMAL
UROBILINOGEN, URINE: NORMAL
VITAMIN D 25-HYDROXY: 8.6 NG/ML (ref 30–100)
WBC # BLD: 1.2 K/UL (ref 3.5–11.3)
WBC UA: ABNORMAL /HPF (ref 0–5)
YEAST: ABNORMAL
ZZ NTE CLEAN UP: ORDERED TEST: NORMAL
ZZ NTE WITH NAME CLEAN UP: SPECIMEN SOURCE: NORMAL

## 2021-03-10 PROCEDURE — 84484 ASSAY OF TROPONIN QUANT: CPT

## 2021-03-10 PROCEDURE — 86702 HIV-2 ANTIBODY: CPT

## 2021-03-10 PROCEDURE — 72158 MRI LUMBAR SPINE W/O & W/DYE: CPT

## 2021-03-10 PROCEDURE — 2580000003 HC RX 258: Performed by: INTERNAL MEDICINE

## 2021-03-10 PROCEDURE — 86644 CMV ANTIBODY: CPT

## 2021-03-10 PROCEDURE — 84100 ASSAY OF PHOSPHORUS: CPT

## 2021-03-10 PROCEDURE — 0202U NFCT DS 22 TRGT SARS-COV-2: CPT

## 2021-03-10 PROCEDURE — 6360000004 HC RX CONTRAST MEDICATION: Performed by: INTERNAL MEDICINE

## 2021-03-10 PROCEDURE — 86663 EPSTEIN-BARR ANTIBODY: CPT

## 2021-03-10 PROCEDURE — 36415 COLL VENOUS BLD VENIPUNCTURE: CPT

## 2021-03-10 PROCEDURE — A9579 GAD-BASE MR CONTRAST NOS,1ML: HCPCS | Performed by: INTERNAL MEDICINE

## 2021-03-10 PROCEDURE — 85055 RETICULATED PLATELET ASSAY: CPT

## 2021-03-10 PROCEDURE — 83615 LACTATE (LD) (LDH) ENZYME: CPT

## 2021-03-10 PROCEDURE — 85027 COMPLETE CBC AUTOMATED: CPT

## 2021-03-10 PROCEDURE — 83735 ASSAY OF MAGNESIUM: CPT

## 2021-03-10 PROCEDURE — 2060000000 HC ICU INTERMEDIATE R&B

## 2021-03-10 PROCEDURE — 82947 ASSAY GLUCOSE BLOOD QUANT: CPT

## 2021-03-10 PROCEDURE — 87086 URINE CULTURE/COLONY COUNT: CPT

## 2021-03-10 PROCEDURE — 86701 HIV-1ANTIBODY: CPT

## 2021-03-10 PROCEDURE — 86738 MYCOPLASMA ANTIBODY: CPT

## 2021-03-10 PROCEDURE — 87389 HIV-1 AG W/HIV-1&-2 AB AG IA: CPT

## 2021-03-10 PROCEDURE — 81001 URINALYSIS AUTO W/SCOPE: CPT

## 2021-03-10 PROCEDURE — 6370000000 HC RX 637 (ALT 250 FOR IP): Performed by: NURSE PRACTITIONER

## 2021-03-10 PROCEDURE — 85379 FIBRIN DEGRADATION QUANT: CPT

## 2021-03-10 PROCEDURE — 6360000002 HC RX W HCPCS: Performed by: INTERNAL MEDICINE

## 2021-03-10 PROCEDURE — U0003 INFECTIOUS AGENT DETECTION BY NUCLEIC ACID (DNA OR RNA); SEVERE ACUTE RESPIRATORY SYNDROME CORONAVIRUS 2 (SARS-COV-2) (CORONAVIRUS DISEASE [COVID-19]), AMPLIFIED PROBE TECHNIQUE, MAKING USE OF HIGH THROUGHPUT TECHNOLOGIES AS DESCRIBED BY CMS-2020-01-R: HCPCS

## 2021-03-10 PROCEDURE — 85384 FIBRINOGEN ACTIVITY: CPT

## 2021-03-10 PROCEDURE — 86140 C-REACTIVE PROTEIN: CPT

## 2021-03-10 PROCEDURE — 6360000002 HC RX W HCPCS: Performed by: NURSE PRACTITIONER

## 2021-03-10 PROCEDURE — 6370000000 HC RX 637 (ALT 250 FOR IP): Performed by: INTERNAL MEDICINE

## 2021-03-10 PROCEDURE — 99223 1ST HOSP IP/OBS HIGH 75: CPT | Performed by: INTERNAL MEDICINE

## 2021-03-10 PROCEDURE — 82306 VITAMIN D 25 HYDROXY: CPT

## 2021-03-10 PROCEDURE — U0005 INFEC AGEN DETEC AMPLI PROBE: HCPCS

## 2021-03-10 PROCEDURE — 2580000003 HC RX 258: Performed by: STUDENT IN AN ORGANIZED HEALTH CARE EDUCATION/TRAINING PROGRAM

## 2021-03-10 PROCEDURE — 86665 EPSTEIN-BARR CAPSID VCA: CPT

## 2021-03-10 PROCEDURE — 83605 ASSAY OF LACTIC ACID: CPT

## 2021-03-10 PROCEDURE — 85730 THROMBOPLASTIN TIME PARTIAL: CPT

## 2021-03-10 PROCEDURE — 86645 CMV ANTIBODY IGM: CPT

## 2021-03-10 PROCEDURE — 82330 ASSAY OF CALCIUM: CPT

## 2021-03-10 PROCEDURE — 6360000002 HC RX W HCPCS: Performed by: STUDENT IN AN ORGANIZED HEALTH CARE EDUCATION/TRAINING PROGRAM

## 2021-03-10 PROCEDURE — 82728 ASSAY OF FERRITIN: CPT

## 2021-03-10 PROCEDURE — 85610 PROTHROMBIN TIME: CPT

## 2021-03-10 PROCEDURE — 87449 NOS EACH ORGANISM AG IA: CPT

## 2021-03-10 PROCEDURE — 84145 PROCALCITONIN (PCT): CPT

## 2021-03-10 PROCEDURE — 2580000003 HC RX 258: Performed by: NURSE PRACTITIONER

## 2021-03-10 PROCEDURE — 86664 EPSTEIN-BARR NUCLEAR ANTIGEN: CPT

## 2021-03-10 PROCEDURE — 80053 COMPREHEN METABOLIC PANEL: CPT

## 2021-03-10 RX ORDER — ACETAMINOPHEN 325 MG/1
650 TABLET ORAL EVERY 6 HOURS PRN
Status: DISCONTINUED | OUTPATIENT
Start: 2021-03-10 | End: 2021-03-10

## 2021-03-10 RX ORDER — SODIUM CHLORIDE 0.9 % (FLUSH) 0.9 %
10 SYRINGE (ML) INJECTION ONCE
Status: DISCONTINUED | OUTPATIENT
Start: 2021-03-10 | End: 2021-03-15 | Stop reason: HOSPADM

## 2021-03-10 RX ORDER — LANOLIN ALCOHOL/MO/W.PET/CERES
100 CREAM (GRAM) TOPICAL DAILY
Status: DISCONTINUED | OUTPATIENT
Start: 2021-03-10 | End: 2021-03-15 | Stop reason: HOSPADM

## 2021-03-10 RX ORDER — POTASSIUM CHLORIDE 7.45 MG/ML
10 INJECTION INTRAVENOUS PRN
Status: DISCONTINUED | OUTPATIENT
Start: 2021-03-10 | End: 2021-03-15 | Stop reason: HOSPADM

## 2021-03-10 RX ORDER — SODIUM CHLORIDE 9 MG/ML
INJECTION, SOLUTION INTRAVENOUS CONTINUOUS
Status: DISCONTINUED | OUTPATIENT
Start: 2021-03-10 | End: 2021-03-15 | Stop reason: HOSPADM

## 2021-03-10 RX ORDER — POTASSIUM CHLORIDE 20 MEQ/1
40 TABLET, EXTENDED RELEASE ORAL PRN
Status: DISCONTINUED | OUTPATIENT
Start: 2021-03-10 | End: 2021-03-15 | Stop reason: HOSPADM

## 2021-03-10 RX ORDER — VITAMIN B COMPLEX
6000 TABLET ORAL DAILY
Status: DISCONTINUED | OUTPATIENT
Start: 2021-03-10 | End: 2021-03-10

## 2021-03-10 RX ORDER — SODIUM CHLORIDE 0.9 % (FLUSH) 0.9 %
10 SYRINGE (ML) INJECTION PRN
Status: DISCONTINUED | OUTPATIENT
Start: 2021-03-10 | End: 2021-03-15 | Stop reason: HOSPADM

## 2021-03-10 RX ORDER — VITAMIN B COMPLEX
2000 TABLET ORAL DAILY
Status: DISCONTINUED | OUTPATIENT
Start: 2021-03-17 | End: 2021-03-10

## 2021-03-10 RX ORDER — ACETAMINOPHEN 650 MG/1
650 SUPPOSITORY RECTAL EVERY 6 HOURS PRN
Status: DISCONTINUED | OUTPATIENT
Start: 2021-03-10 | End: 2021-03-10

## 2021-03-10 RX ORDER — PANTOPRAZOLE SODIUM 40 MG/1
40 TABLET, DELAYED RELEASE ORAL
Status: DISCONTINUED | OUTPATIENT
Start: 2021-03-11 | End: 2021-03-15 | Stop reason: HOSPADM

## 2021-03-10 RX ORDER — DEXAMETHASONE SODIUM PHOSPHATE 10 MG/ML
6 INJECTION INTRAMUSCULAR; INTRAVENOUS EVERY 24 HOURS
Status: DISCONTINUED | OUTPATIENT
Start: 2021-03-10 | End: 2021-03-10

## 2021-03-10 RX ORDER — LORAZEPAM 2 MG/ML
1 INJECTION INTRAMUSCULAR EVERY 4 HOURS PRN
Status: DISCONTINUED | OUTPATIENT
Start: 2021-03-10 | End: 2021-03-15 | Stop reason: HOSPADM

## 2021-03-10 RX ORDER — HYDROCODONE BITARTRATE AND ACETAMINOPHEN 5; 325 MG/1; MG/1
1 TABLET ORAL EVERY 6 HOURS PRN
Status: DISCONTINUED | OUTPATIENT
Start: 2021-03-10 | End: 2021-03-15 | Stop reason: HOSPADM

## 2021-03-10 RX ORDER — MULTIVITAMIN WITH IRON
1 TABLET ORAL DAILY
Status: DISCONTINUED | OUTPATIENT
Start: 2021-03-10 | End: 2021-03-15 | Stop reason: HOSPADM

## 2021-03-10 RX ORDER — SODIUM CHLORIDE 0.9 % (FLUSH) 0.9 %
10 SYRINGE (ML) INJECTION EVERY 12 HOURS SCHEDULED
Status: DISCONTINUED | OUTPATIENT
Start: 2021-03-10 | End: 2021-03-15 | Stop reason: HOSPADM

## 2021-03-10 RX ADMIN — ENOXAPARIN SODIUM 30 MG: 30 INJECTION SUBCUTANEOUS at 09:04

## 2021-03-10 RX ADMIN — CEFTRIAXONE SODIUM 2000 MG: 2 INJECTION, POWDER, FOR SOLUTION INTRAMUSCULAR; INTRAVENOUS at 19:44

## 2021-03-10 RX ADMIN — Medication 500 MG: at 11:33

## 2021-03-10 RX ADMIN — HYDROCODONE BITARTRATE AND ACETAMINOPHEN 1 TABLET: 5; 325 TABLET ORAL at 09:30

## 2021-03-10 RX ADMIN — PIPERACILLIN AND TAZOBACTAM 3375 MG: 3; .375 INJECTION, POWDER, FOR SOLUTION INTRAVENOUS at 05:56

## 2021-03-10 RX ADMIN — HYDROCODONE BITARTRATE AND ACETAMINOPHEN 1 TABLET: 5; 325 TABLET ORAL at 17:47

## 2021-03-10 RX ADMIN — VANCOMYCIN HYDROCHLORIDE 1250 MG: 10 INJECTION, POWDER, LYOPHILIZED, FOR SOLUTION INTRAVENOUS at 17:41

## 2021-03-10 RX ADMIN — GADOTERIDOL 20 ML: 279.3 INJECTION, SOLUTION INTRAVENOUS at 17:10

## 2021-03-10 RX ADMIN — Medication 6000 UNITS: at 09:03

## 2021-03-10 ASSESSMENT — ENCOUNTER SYMPTOMS
COUGH: 1
NAUSEA: 0
BACK PAIN: 1
VOMITING: 0
ABDOMINAL DISTENTION: 0
PHOTOPHOBIA: 0
APNEA: 0
SHORTNESS OF BREATH: 1
COLOR CHANGE: 0
WHEEZING: 0
BLOOD IN STOOL: 0
EYE ITCHING: 0

## 2021-03-10 ASSESSMENT — PAIN DESCRIPTION - PAIN TYPE: TYPE: CHRONIC PAIN

## 2021-03-10 ASSESSMENT — PAIN SCALES - GENERAL
PAINLEVEL_OUTOF10: 10
PAINLEVEL_OUTOF10: 4
PAINLEVEL_OUTOF10: 0

## 2021-03-10 NOTE — ED PROVIDER NOTES
Faculty Sign-Out Attestation  Handoff taken on the following patient from prior Attending Physician: Terra Petersen    I was available and discussed any additional care issues that arose and coordinated the management plans with the resident(s) caring for the patient during my duty period. Any areas of disagreement with residents documentation of care or procedures are noted on the chart. I was personally present for the key portions of any/all procedures during my duty period. I have documented in the chart those procedures where I was not present during the key portions.     Fever unknown source, fatigue, ID consulted, starting abx & admitted    [facility], DO  Attending Physician     [facility],   03/10/21 9672

## 2021-03-10 NOTE — ED NOTES
The following specimens labeled with pt sticker and tubed to lab:     [] [place]   [] on ice   [] Blue   [] Green/yellow  [] Green/black [] on ice  [] Pink  [] Red  [] Yellow     [] Urine Sample    [x] Covid19 Swab    [x] Blood Cultures x2       Adam Foster, RN  03/09/21 2048

## 2021-03-10 NOTE — CONSULTS
CONSULT NOTE                    Today's Date: 3/10/2021  Patient Name: Marilou Gonzalez  Date of admission: 3/9/2021  7:34 PM  Patient's age: 64 y. o., 1965  [facility]: [facility] ([facility] [A41.9]          CHIEF COMPLAINT:  Fatigue    History Obtained From:  patient, electronic medical record    HISTORY OF PRESENT ILLNESS:      Patient 63-year-old male admitted for sepsis. Patient reports for past 2 days he had noticed increasing SOB, fatigue, nausea, vomiting. Additionally complaining of generalized myalgias, low back pain. Denies productive sputum, sick contacts, changes in bowel or bladder function. In the ED, patient found to be febrile at 101.2, HR 94, RR highest 21. Initial lab work shows significant neutropenia WBC at 1.2, mild thrombocytopenia with platelets at 306. Ferritin and procalcitonin elevated. Chest x-ray and CT chest shows no acute abnormalities. Given concern for sepsis, sepsis protocol initiated patient admitted for management. Evaluated by infectious disease, noted increased percentage of immature white blood cells which prompted oncology consult for evaluation of possible leukemia. Past Medical History:   has a past medical history of GERD (gastroesophageal reflux disease) and Patient denies medical problems. Past Surgical History:   has no past surgical history on file.      Home Medications:    Prior to Admission medications    Not on File     Current Facility-Administered Medications   Medication Dose Route Frequency Provider Last Rate Last Admin    0.9 % sodium chloride infusion   Intravenous Continuous KARY Monge  mL/hr at 03/10/21 0235 Rate Verify at 03/10/21 0235    sodium chloride flush 0.9 % injection 10 mL  10 mL Intravenous 2 times per day KARY Monge CNP   Stopped at 03/10/21 0905    sodium chloride flush 0.9 % injection 10 mL  10 mL Intravenous PRN KARY Monge CNP        vancomycin (VANCOCIN) 1250 mg in dextrose 5 % 250 mL IVPB  1,250 mg Intravenous Q12H Salvador Monaco MD        vancomycin (VANCOCIN) intermittent dosing (placeholder)   Other RX Placeholder Salvador Monaco MD   Stopped at 03/10/21 0300    HYDROcodone-acetaminophen (NORCO) 5-325 MG per tablet 1 tablet  1 tablet Oral Q6H PRN Pawel Levy, DO   1 tablet at 03/10/21 0930    azithromycin (ZITHROMAX) 500 mg in dextrose 5% 250 mL IVPB  500 mg Intravenous Q24H Delfina Luong MD   Stopped at 03/10/21 1400    cefTRIAXone (ROCEPHIN) 2000 mg IVPB in D5W 50ml minibag  2,000 mg Intravenous Q12H Natalie Turk MD        multivitamin 1 tablet  1 tablet Oral Daily Armas Booty, DO        thiamine tablet 100 mg  100 mg Oral Daily Pawel Booty, DO        LORazepam (ATIVAN) injection 1 mg  1 mg Intravenous Q4H PRN Pawel Levy, DO        [START ON 3/11/2021] pantoprazole (PROTONIX) tablet 40 mg  40 mg Oral QAM AC Pawel Shelbyoty, DO        potassium chloride (KLOR-CON M) extended release tablet 40 mEq  40 mEq Oral PRN Pawel Booty, DO        Or    potassium bicarb-citric acid (EFFER-K) effervescent tablet 40 mEq  40 mEq Oral PRN Armas Booty, DO        Or    potassium chloride 10 mEq/100 mL IVPB (Peripheral Line)  10 mEq Intravenous PRN Pawel Shelbyoty, DO        influenza quadrivalent split vaccine (FLUZONE;FLUARIX;FLULAVAL;AFLURIA) injection 0.5 mL  0.5 mL Intramuscular Prior to discharge Pawel Levy DO           Allergies:  Patient has no known allergies. Social History:   reports that he has quit smoking. His smoking use included cigarettes. He smoked 1.00 pack per day. He has never used smokeless tobacco. He reports current alcohol use. He reports previous drug use. Drug: Cocaine. Family History: family history includes Hypertension in his father and mother. REVIEW OF SYSTEMS:      Constitutional: No fever or chills.  No night sweats, no weight loss   Eyes: No eye discharge, double vision, or eye pain   HEENT: negative for sore mouth, sore throat, hoarseness and voice change   Respiratory: negative for cough , sputum, dyspnea, wheezing, hemoptysis, chest pain   Cardiovascular: negative for chest pain, dyspnea, palpitations, orthopnea, PND   Gastrointestinal: negative for nausea, vomiting, diarrhea, constipation, or  abdominal pain   Genitourinary: negative for frequency, dysuria, nocturia, urinary incontinence, and hematuria   Hematologic/Lymphatic: negative for easy bruising, bleeding, petechiae or lymphadenopathy  Endocrine: negative for heat or cold intolerance,   Musculoskeletal: negative for myalgias, arthralgias, pain, joint swelling,and bone pain   Neurological: negative for headaches, dizziness, seizures, weakness, or numbness  Integument: negative for rash, skin lesions, bruises.     PHYSICAL EXAM:        Vitals:  /80   Pulse 69   Temp 97.9 °F (36.6 °C) (Oral)   Resp 17   Ht 6' 5\" (1.956 m)   Wt 223 lb 15.8 oz (101.6 kg)   SpO2 98%   BMI 26.56 kg/m²     General appearance - well appearing, not in pain or distress, not jaundiced   Mental status - alert and cooperative   Eyes - pupils equal and reactive, extraocular eye movements intact   Ears - bilateral TM's and external ear canals normal   Mouth - mucous membranes moist, pharynx normal without lesions,   Neck - supple, no palpable  adenopathy , trach midline   Lymphatics - no palpable lymphadenopathy, no hepatosplenomegaly   Chest - clear to auscultation, no wheezes, rales or rhonchi, symmetric air entry , normal chest expansion  Heart - normal rate, regular rhythm, normal S1, S2, no murmurs,  Abdomen - soft, nontender, nondistended, no masses or organomegaly   Neurological - alert, oriented, normal speech, no focal findings or movement disorder noted   Musculoskeletal - no joint tenderness, deformity or swelling   Extremities - peripheral pulses normal, no pedal edema, no clubbing or cyanosis Skin - normal coloration and turgor, no rashes, no suspicious skin lesions noted ,   Port: site of port not red, no tenderness    DATA:      Labs:   [unfilled]    CBC:   Recent Labs     03/09/21  2004 03/10/21  0527   WBC 1.5* 1.2*   HGB 14.1 15.1   HCT 43.3 47.5   PLT See Reflexed IPF Result See Reflexed IPF Result     BMP:   Recent Labs     03/10/21  0527 03/10/21  0703   NA DISREGARD RESULTS. SPECIMEN WILL BE REDRAWN. [address] SPECIMEN WILL BE REDRAWN. 4.9   CO2 DISREGARD RESULTS. SPECIMEN WILL BE REDRAWN. 23   BUN DISREGARD RESULTS. SPECIMEN WILL BE REDRAWN. 12   CREATININE DISREGARD RESULTS. SPECIMEN WILL BE REDRAWN. 1.06   LABGLOM CANNOT BE CALCULATED >60   GLUCOSE DISREGARD RESULTS. SPECIMEN WILL BE REDRAWN. 122*     PT/INR:   Recent Labs     03/09/21  2004 03/10/21  0527   PROTIME 11.0 11.3   INR 1.0 1.1     APTT:  Recent Labs     03/09/21  2004 03/10/21  0527   APTT 30.1 32.6*     LIVER PROFILE:  Recent Labs     03/10/21  0527 03/10/21  0703   AST DISREGARD RESULTS. SPECIMEN WILL BE REDRAWN. 106*   ALT DISREGARD RESULTS. SPECIMEN WILL BE REDRAWN. 27   LABALBU DISREGARD RESULTS.   SPECIMEN WILL BE REDRAWN. 2.7*           IMPRESSION:      Patient Active Problem List   Diagnosis    Suspected COVID-19 virus infection    Other chest pain    Gastrointestinal hemorrhage    ETOH abuse    Transaminasemia    Acute dehydration    SIRS (systemic inflammatory response syndrome) (HCC)    Hematuria    Cystitis    Cocaine use    Fatty liver    GI bleed    Sepsis ([address]    Leukopenia    Thrombocytopenia (HCC)    Vitamin D deficiency    JACLYN (acute kidney injury) ([facility]    Hyponatremia    Hypocalcemia    Hypokalemia    Thyroid nodule, left side    Hypoalbuminemia     Active Hospital Problems    Diagnosis Date Noted    Leukopenia [D72.819] 03/10/2021    Thrombocytopenia ([address] [D69.6] 03/10/2021    Vitamin D deficiency [E55.9] 03/10/2021    JACLYN (acute kidney injury) ([address] [N17.9] 03/10/2021    Hyponatremia [E87.1] 03/10/2021    Hypocalcemia [E83.51] 03/10/2021    Hypokalemia [E87.6] 03/10/2021    Thyroid nodule, left side [E04.1] 03/10/2021    Hypoalbuminemia [E88.09] 03/10/2021    Sepsis ([address] [A41.9] 03/09/2021    Fatty liver [K76.0] 04/13/2020    ETOH abuse [F10.10] 04/12/2020    Acute dehydration [E86.0] 04/12/2020     Thrombocytopenia: Given patient's alcohol abuse and characteristic patterns on LFTs, likely from combination of alcohol use and sepsis, immature cells not too concerning  Sepsis: Possible viral infection, unknown source at this time, ID following with infectious panel  Thyroid nodule: Large nodule on left side measuring 1.7 cm seen on CT, previous CT last year showed low-density 2 cm nodule, no TSH has been done  JACLYN  Ethanol abuse  Fatty liver    RECOMMENDATIONS:  1. Given that it is unlikely leukemia, will not require acute interventions at this time, will follow blood smear  2. Antibiotic therapy as per ID  3. Given persistent thyroid nodule over 1 cm in size will obtain TSH, if TSH normal, will require FNA  4. Monitor CBC  5. No acute intervention required at this time  6. We will follow     Amy Kimble MS4    I have seen and evaluated the H&P by medical student and have made appropriate changes    González Fajardo MD PGY 3      Attending Physician Statement   I have discussed the care of Nuria Urias, including pertinent history and exam findings with the resident. I have reviewed the key elements of all parts of the encounter with the resident. I have seen and examined the patient with the resident. I agree with the assessment and plan and status of the problem list as documented. Thrombocytopenia and leukopenia are likely combination of chronic alcohol abuse and liver disease as well as probable acute infection/sepsis. Less likely to be related to leukemia or bone marrow disease.   We will review the peripheral blood smear and check fibrinogen and monitor labs over the next couple days. If still in doubt we will consider bone marrow testing. Patient's questions were answered to the best of his satisfaction and he verbalized full understanding and agreement. Thank you for allowing us to participate in the care of this pleasant patient.                               [address] [place] [address] [facility]

## 2021-03-10 NOTE — ED NOTES
Bed: 22  Expected date: 3/9/21  Expected time: 7:30 PM  Means of arrival:   Comments:  [address] [place] [address] [address], RN  03/09/21 1934

## 2021-03-10 NOTE — PROGRESS NOTES
Pharmacy Note  Vancomycin Consult    Ananda Charles is a 64 y.o. male started on Vancomycin for pneumonia; consult received from Dr. Juan J Rao to manage therapy. Also receiving the following antibiotics: Zosyn. Patient Active Problem List   Diagnosis    Suspected COVID-19 virus infection    Other chest pain    Gastrointestinal hemorrhage    ETOH abuse    Transaminasemia    Acute dehydration    SIRS (systemic inflammatory response syndrome) (HCC)    Hematuria    Cystitis    Cocaine use    Fatty liver    GI bleed    Sepsis ([address]       Allergies:  Patient has no known allergies. Temp max: 101.2    Recent Labs     03/09/21 2004   BUN 12       Recent Labs     03/09/21 2004   CREATININE 1.35*       Recent Labs     03/09/21 2004   WBC 1.5*       No intake or output data in the 24 hours ending 03/10/21 0239    Culture Date      Source                       Results  pending    Ht Readings from Last 1 Encounters:   04/13/20 6' 5\" (1.956 m)        Wt Readings from Last 1 Encounters:   03/09/21 230 lb (104.3 kg)         Body mass index is 27.27 kg/m². CrCl cannot be calculated (Unknown ideal weight.). Goal Trough Level: 14-17 mcg/mL    Assessment/Plan:  Will initiate Vancomycin  1250 mg IV every 12 hours. Timing of trough level will be determined based on culture results, renal function, and clinical response. Thank you for the consult. Will continue to follow.     [facility], located within [facility]  3/10/2021 2:40 AM

## 2021-03-10 NOTE — CARE COORDINATION
Case Management Initial Discharge Plan  Robby Velásquez,             Met with:patient to discuss discharge plans. Information verified: address, contacts, phone number, , insurance Yes    Emergency Contact/Next of Kin name & number:   Tiana Galo  2. *No Contact Specified*      Aunt/Uncle    [phone]         PCP: list given  Date of last visit:     Insurance Provider: none    Discharge Planning    Living Arrangements:  Friends   Support Systems:  Family Members, Friends/Neighbors    Home has 2 stories  4 stairs to climb to get into front door, 14stairs to climb to reach second floor  Location of bedroom/bathroom in home upper    Patient able to perform ADL's:Independent    Current Services (outpatient & in home) none  DME equipment: none  DME provider: none    Receiving oral anticoagulation therapy? No    If indicated:   Physician managing anticoagulation treatment: n/a  Where does patient obtain lab work for ATC treatment? n/a      Potential Assistance Needed:  N/A    Patient agreeable to home care: No  [place] of choice provided:  n/a    Prior SNF/Rehab Placement and Facility: none  Agreeable to SNF/Rehab: No  [place] of choice provided: n/a     Evaluation: no    Expected Discharge date:       Patient expects to be discharged to:  home  Follow Up Appointment: Best Day/ Time:      Transportation provider: friends family  Transportation arrangements needed for discharge: No    Readmission Risk              Risk of Unplanned Readmission:        17             Does patient have a readmission risk score greater than 14?: Yes  If yes, follow-up appointment must be made within 7 days of discharge.      Goals of Care: get better      Discharge Plan: Home independently,list given for pcp,          Electronically signed by Rosalva Gifford RN on 3/10/21 at 10:40 AM EST

## 2021-03-10 NOTE — ED NOTES
NP notified via Ecal [facility] Ca 0.88     Bam Jean, [facility]  03/10/21 1683

## 2021-03-10 NOTE — H&P
[facility]  Office: [address], DO, Estrellita Bauman, DO, Vy Dumont, DO, Alexandro Weathers, DO, Barry Taylor MD, Ke Amaya MD, Yuli Castillo MD, Dimitry Riggs MD, Lilliana Kathleen MD, Yeimi Garza MD, Anitha Landaverde MD, Patricia Rgugiero MD, Mbonu Mable Nyhan, MD, Ag Paulson DO, Ish Velásquez MD, Isaac Simmons DO, Ruth Matos MD,  Daniel Smart DO, Darin Nieves MD, Lazara hSeldon MD, Nash Rodriguez, [facility], [facility], CNP, Papo Noble, CNP, Lacie Nguyen, CNS, Prudence Sinha, CNP, Baron Simpson, CNP, Kieran Byrd, CNP, Maegan Garcia, CNP, Jazz Pisano, CNP, Nick Hawk PA-C, Natasha Vance, [facility], Jasen Bunn, CNP, Jake Parsons, CNP, Pearl Andre, CNP, Barrie Serrano, CNP, Chinmay Guzman, CNP, Shiv Clark, [address]    HISTORY AND PHYSICAL EXAMINATION            Date:   3/10/2021  Patient name:  Lesli Jiang  Date of admission:  3/9/2021  7:34 PM  MRN:   [id number]  Account:  [de-identified]  YOB: 1965  PCP:    No primary care provider on file. Room:   [facility]  Code Status:    Full Code    Chief Complaint:     Chief Complaint   Patient presents with    Fatigue     x 2 days       History Obtained From:     patient, electronic medical record    History of Present Illness: The patient is a 64 y.o. male who is admitted to the hospital for the management of:  Sepsis    Patient was admitted thru ER with:  \"Robby Levy is a 64 y.o. male who presents by EMS ground with a complaint of 2 days of fatigue patient reports shortness of breath as well as nausea vomiting. Pt reports in general ill feeling denies any sick contacts chest pain he has been coughing producing mucoid sputum. Denies any abdominal pain and nausea vomiting or changes in urinary frequency urgency odor or changes in bowel function. \"    Over the last several days the patient has experienced malaise, fatigue, anorexia. He has had low back pains   No ill contacts   No Covid contacts    Initial database has included:  Temps to 101.2 noted    WBC 1.2Low Panic k/uL RBC5.27m/uL [id number]   Platelet, [id number]     Procalcitonin0. 15High     Calcium, Ion0. 88Low     Vit D, 25-Hydroxy8.6Low     [id number]     CTA:  Impression:  No evidence of pulmonary embolism or acute pulmonary abnormality. 1.7 cm left thyroid nodule. Thyroid ultrasound is recommended for further   characterization if not previously performed. Results for Reyna Walters (MRN [id number]) as of 3/10/2021 10:56   Ref. Range 4/13/2020 05:53 4/13/2020 14:04 3/9/2021 20:04 3/10/2021 05:27 3/10/2021 07:03   Creatinine Latest Ref Range: 0.70 - 1.20 mg/dL 1.11 1.00 1.35 (H) DISREGARD RESULTS. SPECIMEN WILL BE REDRAWN. 1.06     CT 4/2020:  No acute traumatic vascular abnormality in the chest, abdomen or pelvis.       Left 4th rib fracture without pneumothorax.  A small amount of adjacent   lobular intermediate density along the pleural surface is noted suggesting   edema or hemorrhage along the pleura.       Wall thickening in the distal 3rd of the esophagus.       Fatty infiltration of the liver       Probable flash filling hemangioma in the posterior right lobe of the liver. This was present on old exam.       Age-indeterminate wall thickening of the urinary bladder suggesting   age-indeterminate cystitis       No acute abnormality in the chest, abdomen or pelvis otherwise. Past Medical History:     Past Medical History:   Diagnosis Date    GERD (gastroesophageal reflux disease)     Patient denies medical problems         Past Surgical History:     History reviewed. No pertinent surgical history.    The patient denies prior surgical procedures    Medications Prior to Admission:     Prior to Admission medications    Not on File   Patient states he is taking no medications at home    Allergies:     Patient has no known allergies. Social History:     Tobacco:    reports that he has quit smoking. His smoking use included cigarettes. He smoked 1.00 pack per day. He has never used smokeless tobacco.  Alcohol:      reports current alcohol use. Drug Use:  reports previous drug use. Drug: Cocaine. Family History:     Family History   Problem Relation Age of Onset    Hypertension Mother     Hypertension Father    Patient denies stroke, heart disease or cancer in the immediate family      Review of Systems:     Positive and Negative as described in HPI. Review of Systems   Constitutional: Positive for activity change (Decreased), appetite change (Diminished), fatigue and fever. Negative for chills and diaphoresis. Patient is reporting malaise   HENT: Negative for congestion and nosebleeds. Eyes: Negative for photophobia and visual disturbance. Respiratory: Positive for cough and shortness of breath (Some dyspnea on exertion). Negative for wheezing. Cardiovascular: Negative for chest pain and palpitations. Gastrointestinal: Negative for abdominal distention, blood in stool, nausea and vomiting. Genitourinary: Negative for dysuria, flank pain and hematuria. Musculoskeletal: Positive for back pain (He has developed low back pain and stiffness). Negative for arthralgias and myalgias. Skin: Negative for rash and wound. Neurological: Positive for weakness (Generalized). Negative for dizziness and light-headedness. Physical Exam:   /80   Pulse 70   Temp 97.8 °F (36.6 °C) (Oral)   Resp 12   Ht 6' 2\" (1.88 m)   Wt 230 lb (104.3 kg)   SpO2 99%   BMI 29.53 kg/m²   Temp (24hrs), Av.5 °F (37.5 °C), Min:97.8 °F (36.6 °C), Max:101.2 °F (38.4 °C)    Recent Labs     03/10/21  0150   POCGLU 153*       Intake/Output Summary (Last 24 hours) at 3/10/2021 1050  Last data filed at 3/10/2021 9593  Gross per 24 hour   Intake    Output 1300 ml   Net -1300 ml       Physical Exam  Vitals signs reviewed. Constitutional:       General: He is not in acute distress. Appearance: He is ill-appearing. He is not diaphoretic. HENT:      Head: Normocephalic. Nose: Nose normal.   Eyes:      General: No scleral icterus. Conjunctiva/sclera: Conjunctivae normal.   Neck:      Musculoskeletal: Neck supple. Trachea: No tracheal deviation. Cardiovascular:      Rate and Rhythm: Normal rate and regular rhythm. Pulmonary:      Effort: Pulmonary effort is normal. No respiratory distress. Breath sounds: Normal breath sounds. No wheezing or rales. Chest:      Chest wall: No tenderness. Abdominal:      General: Bowel sounds are normal. There is no distension. Palpations: Abdomen is soft. Tenderness: There is no abdominal tenderness. Musculoskeletal:         General: No tenderness. Skin:     General: Skin is warm and dry. Neurological:      General: No focal deficit present.    Psychiatric:      Comments: Affect rather flat and withdrawn  Patient not very conversant or forthcoming with historical data         Investigations:      Laboratory Testing:  Recent Results (from the past 24 hour(s))   CBC WITH AUTO DIFFERENTIAL    Collection Time: 03/09/21  8:04 PM   Result Value Ref Range    WBC 1.5 (L) 3.5 - 11.3 k/uL    RBC 4.91 4.21 - 5.77 m/uL    Hemoglobin 14.1 13.0 - 17.0 g/dL    Hematocrit 43.3 40.7 - 50.3 %    MCV 88.2 82.6 - 102.9 fL    MCH 28.7 25.2 - 33.5 pg    MCHC 32.6 28.4 - 34.8 g/dL    RDW 14.0 11.8 - 14.4 %    Platelets See Reflexed IPF Result 138 - 453 k/uL    MPV NOT REPORTED 8.1 - 13.5 fL    NRBC Automated 0.0 0.0 per 100 WBC    Differential Type NOT REPORTED     WBC Morphology NOT REPORTED     RBC Morphology NOT REPORTED     Platelet Estimate NOT REPORTED     Immature Granulocytes 2 (H) 0 %    Seg Neutrophils 47 36 - 66 %    Lymphocytes 36 24 - 44 %    Atypical Lymphocytes 4 %    Monocytes 11 (H) 1 - 7 %    Eosinophils % 0 (L) 1 - 4 %    Basophils 0 0 - 2 %    Absolute Immature Granulocyte 0.03 0.00 - 0.30 k/uL    Segs Absolute 0.70 (L) 1.8 - 7.7 k/uL    Absolute Lymph # 0.54 (L) 1.0 - 4.8 k/uL    Atypical Lymphocytes Absolute 0.06 k/uL    Absolute Mono # 0.17 0.1 - 0.8 k/uL    Absolute Eos # 0.00 0.0 - 0.4 k/uL    Basophils Absolute 0.00 0.0 - 0.2 k/uL    Morphology Normal    COMPREHENSIVE METABOLIC PANEL    Collection Time: 03/09/21  8:04 PM   Result Value Ref Range    Glucose 101 (H) 70 - 99 mg/dL    BUN 12 6 - 20 mg/dL    CREATININE 1.35 (H) 0.70 - 1.20 mg/dL    Bun/Cre Ratio NOT REPORTED 9 - 20    Calcium 7.5 (L) 8.6 - 10.4 mg/dL    Sodium 130 (L) 135 - 144 mmol/L    Potassium 3.6 (L) 3.7 - 5.3 mmol/L    Chloride 96 (L) 98 - 107 mmol/L    CO2 23 20 - 31 mmol/L    Anion Gap 11 9 - 17 mmol/L    Alkaline Phosphatase 56 40 - 129 U/L    ALT 29 5 - 41 U/L     (H) <40 U/L    Total Bilirubin 0.35 0.3 - 1.2 mg/dL    Total Protein 7.0 6.4 - 8.3 g/dL    Albumin 3.4 (L) 3.5 - 5.2 g/dL    Albumin/Globulin Ratio 0.9 (L) 1.0 - 2.5    GFR Non-African American 55 (L) >60 mL/min    GFR African American >60 >60 mL/min    GFR Comment          GFR Staging NOT REPORTED    Troponin    Collection Time: 03/09/21  8:04 PM   Result Value Ref Range    Troponin, High Sensitivity 17 0 - 22 ng/L    Troponin T NOT REPORTED <0.03 ng/mL    Troponin Interp NOT REPORTED    Brain Natriuretic Peptide    Collection Time: 03/09/21  8:04 PM   Result Value Ref Range    Pro-BNP 47 <300 pg/mL    BNP Interpretation Pro-BNP Reference Range:    D-DIMER, QUANTITATIVE    Collection Time: 03/09/21  8:04 PM   Result Value Ref Range    D-Dimer, Quant 12.69 mg/L FEU   MAGNESIUM    Collection Time: 03/09/21  8:04 PM   Result Value Ref Range    Magnesium 1.9 1.6 - 2.6 mg/dL   CK    Collection Time: 03/09/21  8:04 PM   Result Value Ref Range    Total CK 2,067 (H) 39 - 308 U/L   MYOGLOBIN, SERUM    Collection Time: 03/09/21  8:04 PM   Result Value Ref Range    Myoglobin 538 (H) 28 - 72 ng/mL   Lactic Acid, Plasma    Collection Time: 03/09/21  8:04 PM   Result Value Ref Range    Lactic Acid NOT REPORTED mmol/L    Lactic Acid, Whole Blood 1.4 0.7 - 2.1 mmol/L   Procalcitonin    Collection Time: 03/09/21  8:04 PM   Result Value Ref Range    Procalcitonin 0.18 (H) <0.09 ng/mL   Fibrinogen    Collection Time: 03/09/21  8:04 PM   Result Value Ref Range    Fibrinogen 273 140 - 420 mg/dL   C-Reactive Protein    Collection Time: 03/09/21  8:04 PM   Result Value Ref Range    CRP 36.5 (H) 0.0 - 5.0 mg/L   Lactate Dehydrogenase    Collection Time: 03/09/21  8:04 PM   Result Value Ref Range     (H) 135 - 225 U/L   Ferritin    Collection Time: 03/09/21  8:04 PM   Result Value Ref Range    Ferritin 2,379 (H) 30 - 400 ug/L   Vitamin D 25 Hydroxy    Collection Time: 03/09/21  8:04 PM   Result Value Ref Range    Vit D, 25-Hydroxy 9.9 (L) 30.0 - 100.0 ng/mL   LIPASE    Collection Time: 03/09/21  8:04 PM   Result Value Ref Range    Lipase 125 (H) 13 - 60 U/L   APTT    Collection Time: 03/09/21  8:04 PM   Result Value Ref Range    PTT 30.1 20.5 - 30.5 sec   Protime-INR    Collection Time: 03/09/21  8:04 PM   Result Value Ref Range    Protime 11.0 9.1 - 12.3 sec    INR 1.0    Immature Platelet Fraction    Collection Time: 03/09/21  8:04 PM   Result Value Ref Range    Platelet, Immature Fraction 7.1 1.1 - 10.3 %    Platelet, Fluorescence 118 (L) 138 - 453 k/uL   Culture, Blood 1    Collection Time: 03/09/21  8:28 PM    Specimen: Blood   Result Value Ref Range    Specimen Description . BLOOD     Special Requests L.WRIST 20ML     Culture NO GROWTH 12 HOURS    Culture, Blood 1    Collection Time: 03/09/21  8:41 PM    Specimen: Blood   Result Value Ref Range    Specimen Description . BLOOD     Special Requests L.AC 10ML     Culture NO GROWTH 12 HOURS    COVID-19, Rapid    Collection Time: 03/09/21  8:43 PM    Specimen: Nasopharyngeal Swab   Result Value Ref Range    Specimen Description . NASOPHARYNGEAL SWAB     SARS-CoV-2, Rapid Not Detected Not Detected   Lactate, Sepsis    Collection Time: 03/10/21  1:48 AM   Result Value Ref Range    Lactic Acid, Sepsis NOT REPORTED 0.5 - 1.9 mmol/L    Lactic Acid, Sepsis, Whole Blood 0.8 0.5 - 1.9 mmol/L   POC Glucose Fingerstick    Collection Time: 03/10/21  1:50 AM   Result Value Ref Range    POC Glucose 153 (H) 75 - 110 mg/dL   SARS-CoV-2 RT-PCR    Collection Time: 03/10/21  1:55 AM    Specimen: Nasopharyngeal Swab   Result Value Ref Range    SARS-CoV-2          Source . NASOPHARYNGEAL SWAB     SARS-CoV-2 Not Detected Not Detected   Urinalysis with Microscopic    Collection Time: 03/10/21  1:57 AM   Result Value Ref Range    Color, UA YELLOW YELLOW    Turbidity UA CLEAR CLEAR    Glucose, Ur NEGATIVE NEGATIVE    Bilirubin Urine NEGATIVE NEGATIVE    Ketones, Urine NEGATIVE NEGATIVE    Specific Gravity, UA 1.042 (H) 1.005 - 1.030    Urine Hgb LARGE (A) NEGATIVE    pH, UA 5.5 5.0 - 8.0    Protein, UA 4+ (A) NEGATIVE    Urobilinogen, Urine Normal Normal    Nitrite, Urine NEGATIVE NEGATIVE    Leukocyte Esterase, Urine NEGATIVE NEGATIVE    -          WBC, UA 0 TO 2 0 - 5 /HPF    RBC, UA 0 TO 2 0 - 4 /HPF    Casts UA  0 - 8 /LPF     2 TO 5 HYALINE Reference range defined for non-centrifuged specimen. Crystals, UA NOT REPORTED None /HPF    Epithelial Cells UA 0 TO 2 0 - 5 /HPF    Renal Epithelial, UA NOT REPORTED 0 /HPF    Bacteria, UA NOT REPORTED None    Mucus, UA NOT REPORTED None    Trichomonas, UA NOT REPORTED None    Amorphous, UA NOT REPORTED None    Other Observations UA NOT REPORTED NOT REQ.     Yeast, UA NOT REPORTED None   Lactate, Sepsis    Collection Time: 03/10/21  3:16 AM   Result Value Ref Range    Lactic Acid, Sepsis NOT REPORTED 0.5 - 1.9 mmol/L    Lactic Acid, Sepsis, Whole Blood 1.0 0.5 - 1.9 mmol/L   Ionized Calcium    Collection Time: 03/10/21  5:27 AM   Result Value Ref Range    Calcium, Ion 0.88 (L) 1.13 - 1.33 mmol/L   Comprehensive Metabolic Panel w/ Reflex to MG    Collection Time: 03/10/21  5:27 AM   Result Value Ref Range    Glucose DISREGARD RESULTS. SPECIMEN WILL BE REDRAWN. 70 - 99 mg/dL    BUN DISREGARD RESULTS. SPECIMEN WILL BE REDRAWN. 6 - 20 mg/dL    CREATININE DISREGARD RESULTS. SPECIMEN WILL BE REDRAWN. 0.70 - 1.20 mg/dL    Bun/Cre Ratio NOT REPORTED 9 - 20    Calcium DISREGARD RESULTS. SPECIMEN WILL BE REDRAWN. 8.6 - 10.4 mg/dL    Sodium DISREGARD RESULTS. SPECIMEN WILL BE REDRAWN. 135 - 144 mmol/L    Potassium DISREGARD RESULTS. SPECIMEN WILL BE REDRAWN. 3.7 - 5.3 mmol/L    Chloride DISREGARD RESULTS. SPECIMEN WILL BE REDRAWN. 98 - 107 mmol/L    CO2 DISREGARD RESULTS. SPECIMEN WILL BE REDRAWN. 20 - 31 mmol/L    Anion Gap CANNOT BE CALCULATED 9 - 17 mmol/L    Alkaline Phosphatase DISREGARD RESULTS. SPECIMEN WILL BE REDRAWN. 40 - 129 U/L    ALT DISREGARD RESULTS. SPECIMEN WILL BE REDRAWN. 5 - 41 U/L    AST DISREGARD RESULTS. SPECIMEN WILL BE REDRAWN. <40 U/L    Total Bilirubin DISREGARD RESULTS. SPECIMEN WILL BE REDRAWN. 0.3 - 1.2 mg/dL    Total Protein DISREGARD RESULTS. SPECIMEN WILL BE REDRAWN. 6.4 - 8.3 g/dL    Albumin DISREGARD RESULTS. SPECIMEN WILL BE REDRAWN. 3.5 - 5.2 g/dL    Albumin/Globulin Ratio DISREGARD RESULTS.   SPECIMEN WILL BE REDRAWN. 1.0 - 2.5    GFR Non- CANNOT BE CALCULATED >60 mL/min    GFR  CANNOT BE CALCULATED >60 mL/min    GFR Comment          GFR Staging NOT REPORTED    CBC    Collection Time: 03/10/21  5:27 AM   Result Value Ref Range    WBC 1.2 (LL) 3.5 - 11.3 k/uL    RBC 5.27 4.21 - 5.77 m/uL    Hemoglobin 15.1 13.0 - 17.0 g/dL    Hematocrit 47.5 40.7 - 50.3 %    MCV 90.1 82.6 - 102.9 fL    MCH 28.7 25.2 - 33.5 pg    MCHC 31.8 28.4 - 34.8 g/dL    RDW 14.3 11.8 - 14.4 %    Platelets See Reflexed IPF Result 138 - 453 k/uL    MPV NOT REPORTED 8.1 - 13.5 fL    NRBC Automated 0.0 0.0 per 100 WBC   Protime-INR    Collection Time: 03/10/21  5:27 AM   Result Value Ref Range    Protime 11.3 9.1 - 12.3 sec    INR 1.1    APTT    Collection Time: 03/10/21 5: 27 AM   Result Value Ref Range    PTT 32.6 (H) 20.5 - 30.5 sec   Procalcitonin    Collection Time: 03/10/21  5:27 AM   Result Value Ref Range    Procalcitonin 0.15 (H) <0.09 ng/mL   Fibrinogen    Collection Time: 03/10/21  5:27 AM   Result Value Ref Range    Fibrinogen 235 140 - 420 mg/dL   D-Dimer, Quantitative    Collection Time: 03/10/21  5:27 AM   Result Value Ref Range    D-Dimer, Quant 6.71 mg/L FEU   Troponin    Collection Time: 03/10/21  5:27 AM   Result Value Ref Range    Troponin, High Sensitivity 11 0 - 22 ng/L    Troponin T NOT REPORTED <0.03 ng/mL    Troponin Interp NOT REPORTED    Lactic Acid, Plasma    Collection Time: 03/10/21  5:27 AM   Result Value Ref Range    Lactic Acid NOT REPORTED mmol/L    Lactic Acid, Whole Blood 1.1 0.7 - 2.1 mmol/L   Immature Platelet Fraction    Collection Time: 03/10/21  5:27 AM   Result Value Ref Range    Platelet, Immature Fraction 6.9 1.1 - 10.3 %    Platelet, Fluorescence 111 (L) 138 - 453 k/uL   SPECIMEN REJECTION    Collection Time: 03/10/21  5:27 AM   Result Value Ref Range    Specimen Source . BLOOD     Ordered Test CRP,FERR,LD,MG,SHERRILL,VITD     Reason for Rejection Unable to perform testing: Specimen hemolyzed.      - NOT REPORTED    Comprehensive Metabolic Panel w/ Reflex to MG    Collection Time: 03/10/21  7:03 AM   Result Value Ref Range    Glucose 122 (H) 70 - 99 mg/dL    BUN 12 6 - 20 mg/dL    CREATININE 1.06 0.70 - 1.20 mg/dL    Bun/Cre Ratio NOT REPORTED 9 - 20    Calcium 7.0 (L) 8.6 - 10.4 mg/dL    Sodium 136 135 - 144 mmol/L    Potassium 4.9 3.7 - 5.3 mmol/L    Chloride 102 98 - 107 mmol/L    CO2 23 20 - 31 mmol/L    Anion Gap 11 9 - 17 mmol/L    Alkaline Phosphatase 45 40 - 129 U/L    ALT 27 5 - 41 U/L     (H) <40 U/L    Total Bilirubin 0.25 (L) 0.3 - 1.2 mg/dL    Total Protein 6.3 (L) 6.4 - 8.3 g/dL    Albumin 2.7 (L) 3.5 - 5.2 g/dL    Albumin/Globulin Ratio 0.8 (L) 1.0 - 2.5    GFR Non-African American >60 >60 mL/min    GFR African American >60 INFECTIOUS DISEASES  PHARMACY TO DOSE VANCOMYCIN  IP CONSULT TO ONCOLOGY     Patient is admitted as inpatient status because of co-morbidities listed above, severity of signs and symptoms as outlined, requirement for current medical therapies and most importantly because of direct risk to patient if care not provided in a hospital setting. Alysia Hooker DO  3/10/2021  10:50 AM    Copy sent to Dr. Yoel Romero primary care provider on file.

## 2021-03-10 NOTE — ED NOTES
Pt resting on stretcher with nonlabored respirations, no acute distress noted. Pt remains on continuous monitor. Pt updated on POC.  Antibiotics continue to infuse via pump     Anali Lebron RN  03/10/21 9197

## 2021-03-10 NOTE — ED NOTES
Pt resting in bed, pt appears to be diaphoretic , urinal provided, NAD noted, call light in reach.      Bran Hackett RN  03/10/21 9054

## 2021-03-10 NOTE — ED PROVIDER NOTES
[address]  ED  Emergency Department Encounter  EmergencyMedicine Resident     Pt Name:Robby Grande  MRN: [id number]  [place] 1965  Date of evaluation: 3/9/21  PCP:  No primary care provider on file. CHIEF COMPLAINT       Chief Complaint   Patient presents with    Fatigue     x 2 days       HISTORY OF PRESENT ILLNESS  (Location/Symptom, Timing/Onset, Context/Setting, Quality, Duration, Modifying Factors, Severity.)      Dread Pinedo is a 64 y.o. male who presents by EMS ground with a complaint of 2 days of fatigue patient reports shortness of breath as well as nausea vomiting. Pt reports in general ill feeling denies any sick contacts chest pain he has been coughing producing mucoid sputum. Denies any abdominal pain and nausea vomiting or changes in urinary frequency urgency odor or changes in bowel function. PAST MEDICAL / SURGICAL / SOCIAL / FAMILY HISTORY      has a past medical history of GERD (gastroesophageal reflux disease) and Patient denies medical problems. has no past surgical history on file.       Social History     Socioeconomic History    Marital status: Single     Spouse name: Not on file    Number of children: Not on file    Years of education: Not on file    Highest education level: Not on file   Occupational History    Not on file   Social Needs    Financial resource strain: Not on file    Food insecurity     Worry: Not on file     Inability: Not on file    Transportation needs     Medical: Not on file     Non-medical: Not on file   Tobacco Use    Smoking status: Former Smoker     Packs/day: 1.00     Types: Cigarettes    Smokeless tobacco: Never Used   Substance and Sexual Activity    Alcohol use: Yes     Comment: pt states he \"drinks beer once a week\"    Drug use: Not Currently     Types: Cocaine    Sexual activity: Not Currently   Lifestyle    Physical activity     Days per week: Not on file     Minutes per session: Not on file    Stress: Not on file   Relationships    Social connections     Talks on phone: Not on file     Gets together: Not on file     Attends Faith service: Not on file     Active member of club or organization: Not on file     Attends meetings of clubs or organizations: Not on file     Relationship status: Not on file    Intimate partner violence     Fear of current or ex partner: Not on file     Emotionally abused: Not on file     Physically abused: Not on file     Forced sexual activity: Not on file   Other Topics Concern    Not on file   Social History Narrative    Not on file       Family History   Problem Relation Age of Onset    Hypertension Mother     Hypertension Father        Allergies:  Patient has no known allergies. Home Medications:  Prior to Admission medications    Not on File       REVIEW OF SYSTEMS    (2-9 systems for level 4, 10 or more for level 5)      Review of Systems   Constitutional: Positive for activity change, appetite change and fatigue. HENT: Negative for congestion, rhinorrhea and sore throat. Eyes: Negative for photophobia. Respiratory: Positive for cough and shortness of breath. Cardiovascular: Negative for chest pain. Gastrointestinal: Negative for abdominal pain, nausea and vomiting. Genitourinary: Negative for difficulty urinating. Musculoskeletal: Positive for myalgias. Skin: Negative for rash. Allergic/Immunologic: Negative for environmental allergies and food allergies. Neurological: Positive for weakness. Negative for numbness and headaches. Psychiatric/Behavioral: Positive for agitation. PHYSICAL EXAM   (up to 7 for level 4, 8 or more for level 5)      INITIAL VITALS:   /81   Pulse 94   Temp 101.2 °F (38.4 °C) (Oral)   Resp 14   Wt 230 lb (104.3 kg)   SpO2 90%   BMI 27.27 kg/m²     Physical Exam  Vitals signs and nursing note reviewed. HENT:      Head: Normocephalic.       Right Ear: External ear normal.      Left Ear: External ear normal.      Nose: Nose normal.      Mouth/Throat:      Mouth: Mucous membranes are moist.   Eyes:      General: No scleral icterus. Extraocular Movements: Extraocular movements intact. Conjunctiva/sclera: Conjunctivae normal.   Neck:      Musculoskeletal: Normal range of motion. Cardiovascular:      Rate and Rhythm: Normal rate and regular rhythm. Pulmonary:      Breath sounds: Rales present. Abdominal:      Palpations: Abdomen is soft. Tenderness: There is no abdominal tenderness. Musculoskeletal: Normal range of motion. Right lower leg: No edema. Left lower leg: No edema. Skin:     General: Skin is warm and dry. Neurological:      Mental Status: He is alert and oriented to person, place, and time. Psychiatric:         Mood and Affect: Affect is angry.          DIFFERENTIAL  DIAGNOSIS     PLAN (LABS / Roly Negus / EKG):  Orders Placed This Encounter   Procedures    COVID-19, Rapid    Culture, Urine    Culture, Blood 1    Culture, Blood 1    XR CHEST PORTABLE    CT CHEST PULMONARY EMBOLISM W CONTRAST    CBC WITH AUTO DIFFERENTIAL    COMPREHENSIVE METABOLIC PANEL    Troponin    Brain Natriuretic Peptide    D-DIMER, QUANTITATIVE    MAGNESIUM    CK    MYOGLOBIN, SERUM    Lactic Acid, Plasma    Procalcitonin    Fibrinogen    C-Reactive Protein    Lactate Dehydrogenase    Ferritin    Vitamin D 25 Hydroxy    LIPASE    Urinalysis with Microscopic    APTT    Protime-INR    Immature Platelet Fraction    Diet NPO Effective Now Exceptions are: Sips of Water with Meds    Droplet Plus Isolation    EKG 12 Lead       MEDICATIONS ORDERED:  Orders Placed This Encounter   Medications    acetaminophen (TYLENOL) tablet 1,000 mg    dexamethasone (DECADRON) injection 10 mg    DISCONTD: calcium gluconate 1,000 mg in dextrose 5 % 100 mL IVPB    calcium gluconate 1000 mg in sodium chloride 50 mL    0.9 % sodium chloride bolus    piperacillin-tazobactam (ZOSYN) 4,500 (L) 138 - 453 k/uL       IMPRESSION: Patient is a 79-year-old male presenting by EMS with complaint of 2 days of fatigue he is ill-appearing febrile warm to the touch not tachycardic there are fine rales on exam he is having shortness of breath and having a new oxygen requirement satting 86% with good waveform on room air. Symptoms are highly concerning for COVID-19 infection plan a broad work-up for sepsis labs blood cultures anticipate admission    RADIOLOGY:  Xr Chest Portable    Result Date: 3/9/2021  EXAMINATION: ONE XRAY VIEW OF THE CHEST 3/9/2021 2:57 pm COMPARISON: None. HISTORY: ORDERING SYSTEM PROVIDED HISTORY: fatigue sob TECHNOLOGIST PROVIDED HISTORY: fatigue sob Reason for Exam: port Upright FINDINGS: The lungs are without acute focal process. There is no effusion or pneumothorax. The cardiomediastinal silhouette is without acute process. The osseous structures are without acute process. No acute process. EKG  Sinus rhythm at a rate of 93 there is normal axis normal intervals QTC is 422 there is normal R wave progression precordial T wave onset nonspecific is not any ST-T wave changes. All EKG's are interpreted by the Emergency Department Physician who either signs or Co-signs this chart in the absence of a cardiologist.    EMERGENCY DEPARTMENT COURSE:  ED Course as of Mar 10 0718   Tue Mar 09, 2021   1951 Will hold fluids at this time as if patient has Covid  fluid hydration is associated with worse outcomes. [BG]   2020 leukopenia    [BG]   2102 Steroids provided given elevated CRP new oxygen requirement corrected calcium 8 will replete    [BG]   2110 Covid neg, antibiotics and ivf ordered    [BG]   2341 With Dr. Riri Oakes infectious disease, patient's negative CT PE and negative signs of groundglass opacities on CT pulmonary embolism she believes that this patient is comfortably able to be admitted to the non-Covid side at stepdown.   Patient to go to stepdown    [GP]      ED Course

## 2021-03-10 NOTE — ED PROVIDER NOTES
[address] [facility]  Emergency Department  Emergency Medicine Resident Sign-out     Care of Lona Santos was assumed from Dr. Welford Carrel and is being seen for Fatigue (x 2 days)   . The patient's initial evaluation and plan have been discussed with the prior provider who initially evaluated the patient. EMERGENCY DEPARTMENT COURSE / MEDICAL DECISION MAKING:       MEDICATIONS GIVEN:  Orders Placed This Encounter   Medications    acetaminophen (TYLENOL) tablet 1,000 mg    dexamethasone (DECADRON) injection 10 mg    DISCONTD: calcium gluconate 1,000 mg in dextrose 5 % 100 mL IVPB    calcium gluconate 1000 mg in sodium chloride 50 mL    0.9 % sodium chloride bolus    piperacillin-tazobactam (ZOSYN) 4,500 mg in dextrose 5 % 100 mL IVPB (mini-bag)     Order Specific Question:   Antimicrobial Indications     Answer:   Sepsis of Unknown Etiology    vancomycin (VANCOCIN) 2,500 mg in dextrose 5 % 500 mL IVPB     Order Specific Question:   Antimicrobial Indications     Answer:   Sepsis of Unknown Etiology    0.9 % sodium chloride bolus    iopamidol (ISOVUE-370) 76 % injection 75 mL       LABS / RADIOLOGY:     Labs Reviewed   CBC WITH AUTO DIFFERENTIAL - Abnormal; Notable for the following components:       Result Value    WBC 1.5 (*)     Immature Granulocytes 2 (*)     Monocytes 11 (*)     Eosinophils % 0 (*)     Segs Absolute 0.70 (*)     Absolute Lymph # 0.54 (*)     All other components within normal limits   COMPREHENSIVE METABOLIC PANEL - Abnormal; Notable for the following components:    Glucose 101 (*)     CREATININE 1.35 (*)     Calcium 7.5 (*)     Sodium 130 (*)     Potassium 3.6 (*)     Chloride 96 (*)      (*)     Albumin 3.4 (*)     Albumin/Globulin Ratio 0.9 (*)     GFR Non- 55 (*)     All other components within normal limits   CK - Abnormal; Notable for the following components:     Total CK 2,067 (*)     All other components within normal limits   MYOGLOBIN, SERUM - Abnormal; Notable for the following components:    Myoglobin 538 (*)     All other components within normal limits   PROCALCITONIN - Abnormal; Notable for the following components:    Procalcitonin 0.18 (*)     All other components within normal limits   C-REACTIVE PROTEIN - Abnormal; Notable for the following components:    CRP 36.5 (*)     All other components within normal limits   LACTATE DEHYDROGENASE - Abnormal; Notable for the following components:     (*)     All other components within normal limits   VITAMIN D 25 HYDROXY - Abnormal; Notable for the following components:    Vit D, 25-Hydroxy 9.9 (*)     All other components within normal limits   LIPASE - Abnormal; Notable for the following components:    Lipase 125 (*)     All other components within normal limits   IMMATURE PLATELET FRACTION - Abnormal; Notable for the following components:    Platelet, Fluorescence 118 (*)     All other components within normal limits   COVID-19, RAPID   CULTURE, URINE   CULTURE, BLOOD 1   CULTURE, BLOOD 1   TROPONIN   BRAIN NATRIURETIC PEPTIDE   D-DIMER, QUANTITATIVE   MAGNESIUM   LACTIC ACID, PLASMA   FIBRINOGEN   APTT   PROTIME-INR   FERRITIN   URINALYSIS WITH MICROSCOPIC       XR CHEST PORTABLE   Final Result   No acute process. CT CHEST PULMONARY EMBOLISM W CONTRAST    (Results Pending)       RECENT VITALS:     Temp: 101.2 °F (38.4 °C),  Pulse: 94, Resp: 14, BP: 117/81, SpO2: 90 %    This patient is a 64 y.o. Male with *concerns for 2 days of fatigue, shortness of breath, patient had a negative Covid swab, was concerning for sepsis with hypoxia, increased oxygen requirement.   Patient is known to be a nontraumatic rhabdomyolysis with his generalized weakness, concerns for sepsis of uncertain etiology with urinalysis currently pending at this time, patient started on 30 cc/kg bolus, started on Vanco and Zosyn for broad-spectrum antibiotic coverage, has a D-dimer of 12 is currently pending CT pulmonary embolism at this time, patient is notably febrile with a temperature of 101.2. Patient will need to be admitted, pending CT pulmonary embolism. ED Course as of Mar 10 0212   Tue Mar 09, 2021   1951 Will hold fluids at this time as if patient has Covid  fluid hydration is associated with worse outcomes. [BG]   2020 leukopenia    [BG]   2102 Steroids provided given elevated CRP new oxygen requirement corrected calcium 8 will replete    [BG]   2110 Covid neg, antibiotics and ivf ordered    [BG]   2341 With Dr. Jaja Valerio infectious disease, patient's negative CT PE and negative signs of groundglass opacities on CT pulmonary embolism she believes that this patient is comfortably able to be admitted to the non-Covid side at stepdown. Patient to go to stepdown    [GP]      ED Course User Index  [BG] Nila Everett, DO  [GP] Sofy Hoyt MD         OUTSTANDING TASKS / RECOMMENDATIONS:      1. A follow-up CT PE  2. CT PE negative for groundglass opacities, negative for signs of pulmonary embolism     FINAL IMPRESSION:     1. Hypoxia    2. Fatigue, unspecified type    3. Generalized weakness    4. Non-traumatic rhabdomyolysis        DISPOSITION:       DISPOSITION:  []  Discharge   []  Transfer -    [x]  Admission -Intermed  []  Against Medical Advice   []  Eloped   FOLLOW-UP: No follow-up provider specified.    DISCHARGE MEDICATIONS: New Prescriptions    No medications on file          Sofy Hoyt MD  Emergency Medicine Resident  Sonja Mcfadden MD  Resident  03/10/21 1007

## 2021-03-10 NOTE — ED TRIAGE NOTES
Pt presents to ED c/o generalized weakness with fever and N/V x 2 days. Pt rates pain as 8/10 and states it is everywhere. Pt denies SOB, c/p, diarrhea or any other complaints. Pt is A&Ox4, placed on full monitor, changed into gown, EKG done and IV established. Pt is febrile upon assessment. Droplet precautions initiated.

## 2021-03-10 NOTE — CONSULTS
[facility] -   Infectious diseases evaluation  admission date 3/9/2021    reason for consultation:   sepsis    Impression :   Current:  · Leukopenia  · Lymphopenia  · Thrombocytopenia  · CRP elevation  · SIRS - sepsis  · Fatigue myalgias frontal headache fever irritability  · Early rhabdomyolysis  ·     Other:  · QTC   Discussion / summary of stay / plan of care   ·   Recommendations   Concern for leukemia and chronic DIC  VS viral infection  Less likely severe sepsis, due to poor response of the CRP and procalcitonins compared to the level of neutropenia. · vanco ceftriaxone - add zithromax ( watch QTC)  ·  and await BC  · Get resp PCR panel, look for influenza or other viruses  · Mycoplasma IGM and legionella  · CmV and EBV, HIV  · Consult oncology for a bone marrow  · Not a picture of covid - no need to isolate  · Not a picture of meningitis w suppkle neck -  · No clear indication for encephalitis at this time but watching mentation    Infection Control Recommendations   · [place] Precautions  · Contact Isolation     Antimicrobial Stewardship Recommendations   · Simplification of therapy  · Targeted therapy  · IV to oral conversion  · Per Kg dosing    Coordination ofOutpatient Care:   · Estimated Length of IV antimicrobials:  · Patient will need Midline / picc Catheter Insertion:   · Patient will need SNF:  · Patient will need outpatient wound care:     History of Present Illness:   Initial history:  Mariana Pettit is a 64y.o.-year-old male presents w fatigue and SOB x  Days, unclear how long, migth be a week he says, nausea or vomiting and feverish.myalgias weakness, poor appetite. Found w fever 101 and CT neg for pneumonia or for PE - covid neg. Elevated infl markers and pancytopenia. Young cells in blood smear. Early rhabdo and elevated DDimer. Started on AB broad coverage.  BC ordered  UA non ionfective    Has a LBP and very irritable on exam but appropriate - oriented - does not remember the time frame he was sick - has a frontal headache but no photophobia. Interval changes  3/10/2021   Patient Vitals for the past 8 hrs:   BP Temp Temp src Pulse Resp SpO2 Height   03/10/21 0647 117/80   70 12 99 %    03/10/21 0631 (!) 127/91   89 19 100 %    03/10/21 0601 110/76   67 16 99 %    03/10/21 0546 101/72   67 16 100 %    03/10/21 0531 105/73   67 17 100 %    03/10/21 0516 93/60   78 15 94 %    03/10/21 0501 101/73   65 18 100 %    03/10/21 0446 109/68   67 16 100 %    03/10/21 0431 101/70   66 16 100 %    03/10/21 0417 105/72   63 13 100 %    03/10/21 0401 101/80   65 17 100 %    03/10/21 0346 97/73   66 17 99 %    03/10/21 0335 101/73   66 16 100 %    03/10/21 0325  97.8 °F (36.6 °C) Oral       03/10/21 0321       6' 2\" (1.88 m)   03/10/21 0316 101/67   63 15 100 %    03/10/21 0312 (!) 92/52   70 17     03/10/21 0246 100/77   67 18 99 %    03/10/21 0233 94/75   66  98 %    03/10/21 0217 (!) 110/94   66  98 %    03/10/21 0201 98/88   64  97 %        Summary of relevant labs:  Labs:  W 1.2    CRP 30  CK 2067      Creat 1.35-1.06  DDimer 12-6.7  Micro:  covid rapid neg  UA not infective  Imaging:  CT chest - no PE - personally reviewed and no ground glass or pneumonia  - hence not a picture of covid    I have personally reviewed the past medical history, past surgical history, medications, social history, and family history, and I haveupdated the database accordingly. Allergies:   Patient has no known allergies. Review of Systems:     Review of Systems   Constitutional: Positive for activity change, fatigue and fever. HENT: Negative for congestion. Eyes: Negative for itching. Respiratory: Negative for apnea. Cardiovascular: Negative for chest pain. Gastrointestinal: Negative for abdominal distention. Endocrine: Negative for heat intolerance. surgical history.     Medications:      sodium chloride flush  10 mL Intravenous 2 times per day    enoxaparin  30 mg Subcutaneous BID    piperacillin-tazobactam  3,375 mg Intravenous Q8H    dexamethasone  6 mg Intravenous Q24H    Vitamin D  6,000 Units Oral Daily    Followed by   Roslyn Vicente ON 3/17/2021] Vitamin D  2,000 Units Oral Daily    vancomycin  1,250 mg Intravenous Q12H    vancomycin (VANCOCIN) intermittent dosing (placeholder)   Other RX Placeholder       Social History:     Social History     Socioeconomic History    Marital status: Single     Spouse name: Not on file    Number of children: Not on file    Years of education: Not on file    Highest education level: Not on file   Occupational History    Not on file   Social Needs    Financial resource strain: Not on file    Food insecurity     Worry: Not on file     Inability: Not on file    Transportation needs     Medical: Not on file     Non-medical: Not on file   Tobacco Use    Smoking status: Former Smoker     Packs/day: 1.00     Types: Cigarettes    Smokeless tobacco: Never Used   Substance and Sexual Activity    Alcohol use: Yes     Comment: pt states he \"drinks beer once a week\"    Drug use: Not Currently     Types: Cocaine    Sexual activity: Not Currently   Lifestyle    Physical activity     Days per week: Not on file     Minutes per session: Not on file    Stress: Not on file   Relationships    Social connections     Talks on phone: Not on file     Gets together: Not on file     Attends Latter-day service: Not on file     Active member of club or organization: Not on file     Attends meetings of clubs or organizations: Not on file     Relationship status: Not on file    Intimate partner violence     Fear of current or ex partner: Not on file     Emotionally abused: Not on file     Physically abused: Not on file     Forced sexual activity: Not on file   Other Topics Concern    Not on file   Social History Narrative    Not on file       Family History:     Family History   Problem Relation Age of Onset    Hypertension Mother     Hypertension Father       Medical Decision Making:   I have independently reviewed/ordered the following labs:    CBC with Differential:   Recent Labs     03/09/21  2004 03/10/21  0527   WBC 1.5* 1.2*   HGB 14.1 15.1   HCT 43.3 47.5   PLT See Reflexed IPF Result See Reflexed IPF Result   LYMPHOPCT 36  --    MONOPCT 11*  --      BMP:  Recent Labs     03/09/21  2004 03/10/21  0527 03/10/21  0703   * DISREGARD RESULTS. SPECIMEN WILL BE REDRAWN. 136   K 3.6* DISREGARD RESULTS. SPECIMEN WILL BE REDRAWN. 4.9   CL 96* DISREGARD RESULTS. SPECIMEN WILL BE REDRAWN. 102   CO2 23 DISREGARD RESULTS. SPECIMEN WILL BE REDRAWN. 23   BUN 12 DISREGARD RESULTS. SPECIMEN WILL BE REDRAWN. 12   CREATININE 1.35* DISREGARD RESULTS. SPECIMEN WILL BE REDRAWN. 1.06   MG 1.9  --  2.0     Hepatic Function Panel:   Recent Labs     03/10/21  0527 03/10/21  0703   PROT DISREGARD RESULTS. SPECIMEN WILL BE REDRAWN. 6.3*   LABALBU DISREGARD RESULTS. SPECIMEN WILL BE REDRAWN. 2.7*   BILITOT DISREGARD RESULTS. SPECIMEN WILL BE REDRAWN. 0.25*   ALKPHOS DISREGARD RESULTS. SPECIMEN WILL BE REDRAWN. 45   ALT DISREGARD RESULTS. SPECIMEN WILL BE REDRAWN. 27   AST DISREGARD RESULTS. SPECIMEN WILL BE REDRAWN. 106*     No results for input(s): RPR in the last 72 hours. No results for input(s): HIV in the last 72 hours. No results for input(s): BC in the last 72 hours. Lab Results   Component Value Date    CREATININE 1.06 03/10/2021    GLUCOSE 122 03/10/2021       Detailed results: Thank you for allowing us to participate in the care of this patient. Please call with questions.     This note is created with the assistance of a speech recognition program.  While intending to generate adocument that actually reflects the content of the visit, the document can still have some errors including those of syntax and sound a like substitutions which may escape proof reading. It such instances, actual meaningcan be extrapolated by contextual diversion.     Dickson Castrejon MD  Office: [phone]  Perfect serve / office [phone]

## 2021-03-10 NOTE — ED NOTES
Pt HR 39 while asleep, SBP 90-100s Nakita العراقي NP was notified Pt still A&Ox4. Will continue to monitor at this time.       Camilo Richard RN  03/10/21 0753

## 2021-03-10 NOTE — ED PROVIDER NOTES
normal  Ectopy: none  Conduction: normal  ST Segments: normal  T Waves: normal  Q Waves: none    Clinical Impression: no acute changes normal EKG      LABS:  Labs Reviewed   COVID-19, RAPID   CULTURE, URINE   CULTURE, BLOOD 1   CULTURE, BLOOD 1   CBC WITH AUTO DIFFERENTIAL   COMPREHENSIVE METABOLIC PANEL   TROPONIN   BRAIN NATRIURETIC PEPTIDE   D-DIMER, QUANTITATIVE   MAGNESIUM   CK   MYOGLOBIN, SERUM   LACTIC ACID, PLASMA   PROCALCITONIN   FIBRINOGEN   C-REACTIVE PROTEIN   LACTATE DEHYDROGENASE   FERRITIN   VITAMIN D 25 HYDROXY   LIPASE   URINALYSIS WITH MICROSCOPIC   APTT   PROTIME-INR       EMERGENCY DEPARTMENT COURSE:     -------------------------  BP: 117/81, Temp: 101.2 °F (38.4 °C), Pulse: 94, Resp: 14  Physical Exam  Constitutional:       Appearance: He is well-developed. He is not diaphoretic. Comments: Alert but fatigued. Pt O2 sat with good wave form 86 on arrival pt on O2 now and pt notes no prior hx of COPD or medical problems. HENT:      Head: Normocephalic and atraumatic. Right Ear: External ear normal.      Left Ear: External ear normal.   Eyes:      General: No scleral icterus. Right eye: No discharge. Left eye: No discharge. Neck:      Musculoskeletal: Normal range of motion. Trachea: No tracheal deviation. Pulmonary:      Breath sounds: No stridor. Musculoskeletal: Normal range of motion. Skin:     General: Skin is warm and dry. Neurological:      Mental Status: He is alert and oriented to person, place, and time. Coordination: Coordination normal.   Psychiatric:         Behavior: Behavior normal.         Comments    The patient is brought in for evaluation of fatigue and was found to be febrile. Based on the H and P the pt could be suffering from [place] also concerning for sepsis but given age will wait on the COVID test to begin the fluid bolus for sepsis. The pt has no confusion or signs of meningitis moving neck without difficulty.   The pt will have UA for possible UTI. The pt has no neck pain or signs of mastoiditis. The pt has no obvious skin infections. The pt had no murmru or IVDU concerning for endocarditis. The pt highly concerning for COVID. ED Course as of Mar 09 2159   Tue Mar 09, 2021   1951 Will hold fluids at this time as if patient has Covid  fluid hydration is associated with worse outcomes. [BG]   2020 leukopenia    [BG]   2102 Steroids provided given elevated CRP new oxygen requirement corrected calcium 8 will replete    [BG]   2110 Covid neg, antibiotics and ivf ordered    [BG]      ED Course User Index  [BG] DO Jasvir Espinosa Covert,, DO, RDMS.   Attending Emergency Physician          Jasvir Merida,   03/09/21 2159

## 2021-03-10 NOTE — ED NOTES
Pt updated on POC, no acute distress noted. Pt remains on monitor.  Awaiting bed placement      Herbert Palumbo RN  03/10/21 3940

## 2021-03-11 ENCOUNTER — APPOINTMENT (OUTPATIENT)
Dept: INTERVENTIONAL RADIOLOGY/VASCULAR | Age: 56
DRG: 720 | End: 2021-03-11
Payer: MEDICAID

## 2021-03-11 ENCOUNTER — APPOINTMENT (OUTPATIENT)
Dept: MRI IMAGING | Age: 56
DRG: 720 | End: 2021-03-11
Payer: MEDICAID

## 2021-03-11 ENCOUNTER — APPOINTMENT (OUTPATIENT)
Dept: CT IMAGING | Age: 56
DRG: 720 | End: 2021-03-11
Payer: MEDICAID

## 2021-03-11 LAB
ABSOLUTE EOS #: 0 K/UL (ref 0–0.4)
ABSOLUTE IMMATURE GRANULOCYTE: 0 K/UL (ref 0–0.3)
ABSOLUTE LYMPH #: 1.03 K/UL (ref 1–4.8)
ABSOLUTE MONO #: 0.14 K/UL (ref 0.1–0.8)
ABSOLUTE RETIC #: 0.02 M/UL (ref 0.03–0.08)
ANION GAP SERPL CALCULATED.3IONS-SCNC: 10 MMOL/L (ref 9–17)
APPEARANCE CSF: ABNORMAL
ATYPICAL LYMPHOCYTE ABSOLUTE COUNT: 0.07 K/UL
ATYPICAL LYMPHOCYTES: 4 %
BASOPHILS # BLD: 1 % (ref 0–2)
BASOPHILS ABSOLUTE: 0.02 K/UL (ref 0–0.2)
BUN BLDV-MCNC: 12 MG/DL (ref 6–20)
BUN/CREAT BLD: ABNORMAL (ref 9–20)
CALCIUM IONIZED: 0.98 MMOL/L (ref 1.13–1.33)
CALCIUM SERPL-MCNC: 6.9 MG/DL (ref 8.6–10.4)
CHLORIDE BLD-SCNC: 102 MMOL/L (ref 98–107)
CMV IGM: 0.6
CO2: 22 MMOL/L (ref 20–31)
CREAT SERPL-MCNC: 1.03 MG/DL (ref 0.7–1.2)
CRYPTOCOCCUS NEOFORMANS/GATTI CSF FILM ARR.: NOT DETECTED
CYTOMEGALOVIRUS (CMV) CSF FILM ARRAY: NOT DETECTED
CYTOMEGALOVIRUS IGG ANTIBODY: 23.9
DIFFERENTIAL TYPE: ABNORMAL
EBV EARLY ANTIGEN IGG: 200 U/ML
EBV INTERPRETATION: ABNORMAL
EBV NUCLEAR AG AB: 354 U/ML
ENTEROVIRUS CSF FILM ARRAY: NOT DETECTED
EOSINOPHILS RELATIVE PERCENT: 0 % (ref 1–4)
EPSTEIN-BARR VCA IGG: 1339 U/ML
EPSTEIN-BARR VCA IGM: 22 U/ML
ESCHERICHIA COLI K1 CSF FILM ARRAY: NOT DETECTED
FIBRINOGEN: 217 MG/DL (ref 140–420)
GFR AFRICAN AMERICAN: >60 ML/MIN
GFR NON-AFRICAN AMERICAN: >60 ML/MIN
GFR SERPL CREATININE-BSD FRML MDRD: ABNORMAL ML/MIN/{1.73_M2}
GFR SERPL CREATININE-BSD FRML MDRD: ABNORMAL ML/MIN/{1.73_M2}
GLUCOSE BLD-MCNC: 112 MG/DL (ref 70–99)
GLUCOSE, CSF: 62 MG/DL (ref 40–70)
HAEMOPHILUS INFLUENZA CSF FILM ARRAY: NOT DETECTED
HBV SURFACE AB TITR SER: 720.3 MIU/ML
HCT VFR BLD CALC: 38.3 % (ref 40.7–50.3)
HEMOGLOBIN: 12.3 G/DL (ref 13–17)
HEPATITIS B CORE TOTAL ANTIBODY: REACTIVE
HEPATITIS B SURFACE ANTIGEN: NONREACTIVE
HEPATITIS C ANTIBODY: NONREACTIVE
HHV-6 (HERPESVIRUS 6) CSF FILM ARRAY: NOT DETECTED
HIV AG/AB: REACTIVE
HIV INTERPRETATION: NEGATIVE
HIV-1 ANTIBODY: NEGATIVE
HIV-2 AB: NEGATIVE
HSV-1 CSF FILM ARRAY: NOT DETECTED
HSV-2 CSF FILM ARRAY: NOT DETECTED
IMMATURE GRANULOCYTES: 0 %
IMMATURE RETIC FRACT: 2.3 % (ref 2.7–18.3)
LISTERIA MONOCYTOGENES CSF FILM ARRAY: NOT DETECTED
LYMPHOCYTES # BLD: 57 % (ref 24–44)
MCH RBC QN AUTO: 28.7 PG (ref 25.2–33.5)
MCHC RBC AUTO-ENTMCNC: 32.1 G/DL (ref 28.4–34.8)
MCV RBC AUTO: 89.5 FL (ref 82.6–102.9)
MONOCYTES # BLD: 8 % (ref 1–7)
MORPHOLOGY: NORMAL
NEISSERIA MENIGITIDIS CSF FILM ARRAY: NOT DETECTED
NRBC AUTOMATED: 0 PER 100 WBC
PARECHOVIRUS CSF FILM ARRAY: NOT DETECTED
PDW BLD-RTO: 14.1 % (ref 11.8–14.4)
PLATELET # BLD: ABNORMAL K/UL (ref 138–453)
PLATELET ESTIMATE: ABNORMAL
PLATELET, FLUORESCENCE: 100 K/UL (ref 138–453)
PLATELET, IMMATURE FRACTION: 7.5 % (ref 1.1–10.3)
PMV BLD AUTO: ABNORMAL FL (ref 8.1–13.5)
POTASSIUM SERPL-SCNC: 3.7 MMOL/L (ref 3.7–5.3)
PROTEIN CSF: 61.8 MG/DL (ref 15–45)
RBC # BLD: 4.28 M/UL (ref 4.21–5.77)
RBC # BLD: ABNORMAL 10*6/UL
RBC CSF: 3000 /MM3
RETIC %: 0.4 % (ref 0.5–1.9)
RETIC HEMOGLOBIN: 32.9 PG (ref 28.2–35.7)
SEG NEUTROPHILS: 30 % (ref 36–66)
SEGMENTED NEUTROPHILS ABSOLUTE COUNT: 0.54 K/UL (ref 1.8–7.7)
SODIUM BLD-SCNC: 134 MMOL/L (ref 135–144)
SPECIMEN DESCRIPTION: NORMAL
STREPTOCOCCUS AGALACTIAE CSF FILM ARRAY: NOT DETECTED
STREPTOCOCCUS PNEUMONIAE CSF FILM ARRAY: NOT DETECTED
SUPERNAT COLOR CSF: ABNORMAL
T. PALLIDUM, IGG: NONREACTIVE
THYROXINE, FREE: 0.7 NG/DL (ref 0.93–1.7)
TOXOPLASM IGM: 0.13 INDEX
TOXOPLASMA BLOOD FOR RATIO: <0.5 IU/ML
TSH SERPL DL<=0.05 MIU/L-ACNC: 5.29 MIU/L (ref 0.3–5)
TUBE NUMBER CSF: 3
VARICELLA-ZOSTER CSF FILM ARRAY: NOT DETECTED
VOLUME CSF: 6
WBC # BLD: 1.8 K/UL (ref 3.5–11.3)
WBC # BLD: ABNORMAL 10*3/UL
WBC CSF: 6 /MM3
XANTHOCHROMIA: ABNORMAL

## 2021-03-11 PROCEDURE — 87327 CRYPTOCOCCUS NEOFORM AG IA: CPT

## 2021-03-11 PROCEDURE — 86618 LYME DISEASE ANTIBODY: CPT

## 2021-03-11 PROCEDURE — 86592 SYPHILIS TEST NON-TREP QUAL: CPT

## 2021-03-11 PROCEDURE — 87205 SMEAR GRAM STAIN: CPT

## 2021-03-11 PROCEDURE — 87102 FUNGUS ISOLATION CULTURE: CPT

## 2021-03-11 PROCEDURE — 2580000003 HC RX 258: Performed by: INTERNAL MEDICINE

## 2021-03-11 PROCEDURE — 6360000002 HC RX W HCPCS: Performed by: STUDENT IN AN ORGANIZED HEALTH CARE EDUCATION/TRAINING PROGRAM

## 2021-03-11 PROCEDURE — 86778 TOXOPLASMA ANTIBODY IGM: CPT

## 2021-03-11 PROCEDURE — 2060000000 HC ICU INTERMEDIATE R&B

## 2021-03-11 PROCEDURE — 82955 ASSAY OF G6PD ENZYME: CPT

## 2021-03-11 PROCEDURE — 81381 HLA I TYPING 1 ALLELE HR: CPT

## 2021-03-11 PROCEDURE — 2580000003 HC RX 258: Performed by: STUDENT IN AN ORGANIZED HEALTH CARE EDUCATION/TRAINING PROGRAM

## 2021-03-11 PROCEDURE — 87536 HIV-1 QUANT&REVRSE TRNSCRPJ: CPT

## 2021-03-11 PROCEDURE — 86777 TOXOPLASMA ANTIBODY: CPT

## 2021-03-11 PROCEDURE — 97165 OT EVAL LOW COMPLEX 30 MIN: CPT

## 2021-03-11 PROCEDURE — 99233 SBSQ HOSP IP/OBS HIGH 50: CPT | Performed by: INTERNAL MEDICINE

## 2021-03-11 PROCEDURE — 84439 ASSAY OF FREE THYROXINE: CPT

## 2021-03-11 PROCEDURE — A9576 INJ PROHANCE MULTIPACK: HCPCS | Performed by: INTERNAL MEDICINE

## 2021-03-11 PROCEDURE — 97530 THERAPEUTIC ACTIVITIES: CPT

## 2021-03-11 PROCEDURE — 99232 SBSQ HOSP IP/OBS MODERATE 35: CPT | Performed by: INTERNAL MEDICINE

## 2021-03-11 PROCEDURE — B01B1ZZ FLUOROSCOPY OF SPINAL CORD USING LOW OSMOLAR CONTRAST: ICD-10-PCS | Performed by: RADIOLOGY

## 2021-03-11 PROCEDURE — 36415 COLL VENOUS BLD VENIPUNCTURE: CPT

## 2021-03-11 PROCEDURE — 6370000000 HC RX 637 (ALT 250 FOR IP): Performed by: INTERNAL MEDICINE

## 2021-03-11 PROCEDURE — 6360000002 HC RX W HCPCS: Performed by: INTERNAL MEDICINE

## 2021-03-11 PROCEDURE — 70553 MRI BRAIN STEM W/O & W/DYE: CPT

## 2021-03-11 PROCEDURE — 86360 T CELL ABSOLUTE COUNT/RATIO: CPT

## 2021-03-11 PROCEDURE — 97161 PT EVAL LOW COMPLEX 20 MIN: CPT

## 2021-03-11 PROCEDURE — 70450 CT HEAD/BRAIN W/O DYE: CPT

## 2021-03-11 PROCEDURE — 87015 SPECIMEN INFECT AGNT CONCNTJ: CPT

## 2021-03-11 PROCEDURE — 009U3ZX DRAINAGE OF SPINAL CANAL, PERCUTANEOUS APPROACH, DIAGNOSTIC: ICD-10-PCS | Performed by: RADIOLOGY

## 2021-03-11 PROCEDURE — 82945 GLUCOSE OTHER FLUID: CPT

## 2021-03-11 PROCEDURE — 62328 DX LMBR SPI PNXR W/FLUOR/CT: CPT

## 2021-03-11 PROCEDURE — 84157 ASSAY OF PROTEIN OTHER: CPT

## 2021-03-11 PROCEDURE — 89051 BODY FLUID CELL COUNT: CPT

## 2021-03-11 PROCEDURE — 97116 GAIT TRAINING THERAPY: CPT

## 2021-03-11 PROCEDURE — 6360000004 HC RX CONTRAST MEDICATION: Performed by: INTERNAL MEDICINE

## 2021-03-11 PROCEDURE — 87483 CNS DNA AMP PROBE TYPE 12-25: CPT

## 2021-03-11 PROCEDURE — 86317 IMMUNOASSAY INFECTIOUS AGENT: CPT

## 2021-03-11 PROCEDURE — 97535 SELF CARE MNGMENT TRAINING: CPT

## 2021-03-11 PROCEDURE — 87070 CULTURE OTHR SPECIMN AEROBIC: CPT

## 2021-03-11 PROCEDURE — 86704 HEP B CORE ANTIBODY TOTAL: CPT

## 2021-03-11 PROCEDURE — 86359 T CELLS TOTAL COUNT: CPT

## 2021-03-11 PROCEDURE — 86357 NK CELLS TOTAL COUNT: CPT

## 2021-03-11 PROCEDURE — 85384 FIBRINOGEN ACTIVITY: CPT

## 2021-03-11 PROCEDURE — 86803 HEPATITIS C AB TEST: CPT

## 2021-03-11 PROCEDURE — 2580000003 HC RX 258: Performed by: NURSE PRACTITIONER

## 2021-03-11 PROCEDURE — 2709999900 HC NON-CHARGEABLE SUPPLY

## 2021-03-11 PROCEDURE — 85045 AUTOMATED RETICULOCYTE COUNT: CPT

## 2021-03-11 PROCEDURE — 87340 HEPATITIS B SURFACE AG IA: CPT

## 2021-03-11 PROCEDURE — 85025 COMPLETE CBC W/AUTO DIFF WBC: CPT

## 2021-03-11 PROCEDURE — 2500000003 HC RX 250 WO HCPCS: Performed by: INTERNAL MEDICINE

## 2021-03-11 PROCEDURE — 86355 B CELLS TOTAL COUNT: CPT

## 2021-03-11 PROCEDURE — 80048 BASIC METABOLIC PNL TOTAL CA: CPT

## 2021-03-11 PROCEDURE — 86780 TREPONEMA PALLIDUM: CPT

## 2021-03-11 PROCEDURE — 84443 ASSAY THYROID STIM HORMONE: CPT

## 2021-03-11 PROCEDURE — 85055 RETICULATED PLATELET ASSAY: CPT

## 2021-03-11 PROCEDURE — 82330 ASSAY OF CALCIUM: CPT

## 2021-03-11 RX ORDER — ERGOCALCIFEROL (VITAMIN D2) 200 MCG/ML
2000 DROPS ORAL DAILY
Status: DISCONTINUED | OUTPATIENT
Start: 2021-03-11 | End: 2021-03-15 | Stop reason: HOSPADM

## 2021-03-11 RX ORDER — CALCIUM GLUCONATE 20 MG/ML
1000 INJECTION, SOLUTION INTRAVENOUS ONCE
Status: COMPLETED | OUTPATIENT
Start: 2021-03-11 | End: 2021-03-11

## 2021-03-11 RX ORDER — CALCIUM GLUCONATE 20 MG/ML
2000 INJECTION, SOLUTION INTRAVENOUS ONCE
Status: COMPLETED | OUTPATIENT
Start: 2021-03-11 | End: 2021-03-11

## 2021-03-11 RX ORDER — SODIUM CHLORIDE 0.9 % (FLUSH) 0.9 %
10 SYRINGE (ML) INJECTION PRN
Status: DISCONTINUED | OUTPATIENT
Start: 2021-03-11 | End: 2021-03-15 | Stop reason: HOSPADM

## 2021-03-11 RX ORDER — LEVOTHYROXINE SODIUM 0.05 MG/1
50 TABLET ORAL DAILY
Status: DISCONTINUED | OUTPATIENT
Start: 2021-03-11 | End: 2021-03-14

## 2021-03-11 RX ADMIN — ACYCLOVIR SODIUM 1000 MG: 50 INJECTION, SOLUTION INTRAVENOUS at 20:01

## 2021-03-11 RX ADMIN — CALCIUM GLUCONATE 2000 MG: 20 INJECTION, SOLUTION INTRAVENOUS at 12:00

## 2021-03-11 RX ADMIN — PANTOPRAZOLE SODIUM 40 MG: 40 TABLET, DELAYED RELEASE ORAL at 08:38

## 2021-03-11 RX ADMIN — CALCIUM GLUCONATE 1000 MG: 20 INJECTION, SOLUTION INTRAVENOUS at 13:05

## 2021-03-11 RX ADMIN — Medication 500 MG: at 15:41

## 2021-03-11 RX ADMIN — SODIUM CHLORIDE: 9 INJECTION, SOLUTION INTRAVENOUS at 15:32

## 2021-03-11 RX ADMIN — HYDROCODONE BITARTRATE AND ACETAMINOPHEN 1 TABLET: 5; 325 TABLET ORAL at 03:55

## 2021-03-11 RX ADMIN — SODIUM CHLORIDE: 9 INJECTION, SOLUTION INTRAVENOUS at 05:41

## 2021-03-11 RX ADMIN — LEVOTHYROXINE SODIUM 50 MCG: 50 TABLET ORAL at 13:06

## 2021-03-11 RX ADMIN — GADOTERIDOL 20 ML: 279.3 INJECTION, SOLUTION INTRAVENOUS at 19:32

## 2021-03-11 RX ADMIN — ACYCLOVIR SODIUM 1000 MG: 50 INJECTION, SOLUTION INTRAVENOUS at 15:37

## 2021-03-11 RX ADMIN — Medication 100 MG: at 13:05

## 2021-03-11 RX ADMIN — ERGOCALCIFEROL SOLN 200 MCG/ML (8000 UNIT/ML) 2000 UNITS: 8000 SOLUTION at 13:06

## 2021-03-11 RX ADMIN — VANCOMYCIN HYDROCHLORIDE 1250 MG: 10 INJECTION, POWDER, LYOPHILIZED, FOR SOLUTION INTRAVENOUS at 05:49

## 2021-03-11 ASSESSMENT — PAIN DESCRIPTION - LOCATION
LOCATION: BACK;SHOULDER
LOCATION: BACK

## 2021-03-11 ASSESSMENT — ENCOUNTER SYMPTOMS
COLOR CHANGE: 0
ABDOMINAL DISTENTION: 0
APNEA: 0
EYE ITCHING: 0
BACK PAIN: 1

## 2021-03-11 ASSESSMENT — PAIN SCALES - GENERAL
PAINLEVEL_OUTOF10: 8
PAINLEVEL_OUTOF10: 8

## 2021-03-11 ASSESSMENT — PAIN DESCRIPTION - PAIN TYPE: TYPE: CHRONIC PAIN

## 2021-03-11 NOTE — PROGRESS NOTES
Comprehensive Nutrition Assessment    Type and Reason for Visit:  Initial, Positive Nutrition Screen(Wt loss, poor appetite)    Nutrition Recommendations/Plan:   -Continue general diet   -Recommend ensure enlive supplements BID   -Will monitor po intake and weights     Nutrition Assessment:   Pt admitted d/t sepsis. Pt w/ hx of EtoH abuse. Pt was currently w/ therapy during time of visit. RN reports that pt disliked his full liquid diet and was consuming minimal amounts of PO. Pt has now been advanced to a general diet and is excited to have a meal. Pt w/ 10.8% wt loss x 11 mo per EMR, not considered significant. Will start nutritional supplements to compliment po intake and will monitor wt trends. Malnutrition Assessment:  Malnutrition Status:  Insufficient data    Context:  Acute Illness     Findings of the 6 clinical characteristics of malnutrition:  Energy Intake:  (<75% of est'd needs x 2 days)  Weight Loss:  (10.8% wt loss x 11 mo per EMR)     Body Fat Loss:  Unable to assess     Muscle Mass Loss:  Unable to assess    Fluid Accumulation:  No significant fluid accumulation     Strength:  Not Performed    Estimated Daily Nutrient Needs:  Energy (kcal):  1.2-1.3 ~> 5523-0484 kcals/d; Weight Used for Energy Requirements:  Admission     Protein (g):  1.2-1.3 gm/kg ~> 114-124 gms/d; Weight Used for Protein Requirements:  Ideal          Nutrition Related Findings:  BM 3/9; glucose 153; synthroid      Wounds:  Multiple, Open Wounds, Wound Vac       Current Nutrition Therapies:    DIET GENERAL; Anthropometric Measures:  · Height: 6' 5\" (195.6 cm)  · Current Body Weight: 224 lb (101.6 kg)   · Admission Body Weight: 223 lb (101.2 kg)    · Ideal Body Weight: 208 lbs; % Ideal Body Weight 107.7 %   · BMI: 26.6  · BMI Categories: Overweight (BMI 25.0-29. 9)       Nutrition Diagnosis:   · Inadequate oral intake related to (dislikes consistency of FL diet) as evidenced by intake 0-25%(Need for FL diet (Pt now advanced to general), Need for ONS)      Nutrition Interventions:   Food and/or Nutrient Delivery:  Continue Current Diet, Start Oral Nutrition Supplement  Nutrition Education/Counseling:  Education not indicated   Coordination of Nutrition Care:  Continue to monitor while inpatient    Goals: Set   Pt to meet % of est'd needs via PO       Nutrition Monitoring and Evaluation:   Food/Nutrient Intake Outcomes:  Diet Advancement/Tolerance, Food and Nutrient Intake, Supplement Intake  Physical Signs/Symptoms Outcomes:  Biochemical Data, Nutrition Focused Physical Findings, Skin, Weight, GI Status, Fluid Status or Edema     Discharge Planning:     Too soon to determine     Electronically signed by Natali Nicholson RD, LD on 3/11/21 at 12:30 PM EST    Contact: [phone]

## 2021-03-11 NOTE — PROGRESS NOTES
Occupational Therapy   Occupational Therapy Initial Assessment  Date: 3/11/2021   Patient Name: Domenic Sandoval  MRN: [id number]     : 1965    Date of Service: 3/11/2021    Discharge Recommendations:    No therapy recommended at discharge. OT Equipment Recommendations  Equipment Needed: No    Assessment   Performance deficits / Impairments: Decreased functional mobility ; Decreased endurance;Decreased balance  Prognosis: Good  Decision Making: Low Complexity  OT Education: OT Role;Plan of Care  Patient Education: OT role, POC, benefits of OOB. Good return  REQUIRES OT FOLLOW UP: Yes  Activity Tolerance  Activity Tolerance: Patient Tolerated treatment well;Patient limited by fatigue  Safety Devices  Safety Devices in place: Yes  Type of devices: Left in bed;Call light within reach;Gait belt  Restraints  Initially in place: No           Patient Diagnosis(es): The primary encounter diagnosis was Hypoxia. Diagnoses of Fatigue, unspecified type, Generalized weakness, and Non-traumatic rhabdomyolysis were also pertinent to this visit. has a past medical history of GERD (gastroesophageal reflux disease) and Patient denies medical problems. has no past surgical history on file. Restrictions  Restrictions/Precautions  Restrictions/Precautions: Up as Tolerated(up with assist)  Required Braces or Orthoses?: No  Position Activity Restriction  Other position/activity restrictions: MRSA, up with assist    Subjective   General  Patient assessed for rehabilitation services?: Yes  General Comment  Comments: RN ok'd for OT. Pt lethargic but tolerated well  Patient Currently in Pain: Yes  Pain Assessment  Pain Assessment: 0-10  Pain Level: 8  Pain Type: Chronic pain  Pain Location: Back; Shoulder  Pain Orientation: Left;Right  Response to Pain Intervention: Patient Satisfied    Social/Functional History  Social/Functional History  Lives With: Alone  Type of Home: House  Home Layout: Two level, Performs ADL's on one Status: Impaired(pt reports intermittent n/t in B feet)        LUE AROM (degrees)  LUE AROM : WFL  Left Hand AROM (degrees)  Left Hand AROM: WFL  RUE AROM (degrees)  RUE AROM : WFL  Right Hand AROM (degrees)  Right Hand AROM: WFL  LUE Strength  Gross LUE Strength: WFL  L Hand General: 4+/5  RUE Strength  Gross RUE Strength: WFL  R Hand General: 4+/5     Plan   Plan  Times per week: 2-3 visits    AM-PAC Score  AM-[facility] Inpatient Daily Activity Raw Score: 21 (03/11/21 1215)  AM-PAC Inpatient ADL T-Scale Score : 44.27 (03/11/21 1215)  ADL Inpatient CMS 0-100% Score: 32.79 (03/11/21 1215)  ADL Inpatient CMS G-Code Modifier : Jarett Jade (03/11/21 1215)    Goals  Short term goals  Time Frame for Short term goals: pt will, by discharge  Short term goal 1: dem LB ADL/toileting ind with AE PRN  Short term goal 2: complete functional mobility/transfer independently with LRD PRN  Short term goal 3: dem dynamic standing for 15+ minutes during functional activity independently with LRD PRN     Therapy Time   Individual Concurrent Group Co-treatment   Time In 0944         Time Out 1008         Minutes 24         Timed Code Treatment Minutes: 4150 Jasmeet Rivera

## 2021-03-11 NOTE — PROGRESS NOTES
Progress Notes                  Today's Date: 3/11/2021  Patient Name: Rayray Linda  Date of admission: 3/9/2021  7:34 PM  Patient's age: 64 y. o., 1965  [facility] [A41.9]          CHIEF COMPLAINT:  Fatigue    History Obtained From:  patient, electronic medical record    Interval History:  Pt seen and examined at bedside  He was resting comfortably in bed  No acute events overnight  Pt states he feels about the same as he did yesterday, still c/o fatigue  WBC- 1.8, up from 1.2 yesterday. Platelets 128 today down from 118 yesterday. Hg down trending 14.9>14.1>15.1>12.3  TSH elevated at 5.29 and free T4 of 0.70, will need further workup of 1.7 cm thyroid nodule. Likely FNA would be next step in workup. Primary started patient on synthroid today  HIV Ag/Ab was reactive, ID following     HISTORY OF PRESENT ILLNESS:      Patient 58-year-old male admitted for sepsis. Patient reports for past 2 days he had noticed increasing SOB, fatigue, nausea, vomiting. Additionally complaining of generalized myalgias, low back pain. Denies productive sputum, sick contacts, changes in bowel or bladder function. In the ED, patient found to be febrile at 101.2, HR 94, RR highest 21. Initial lab work shows significant neutropenia WBC at 1.2, mild thrombocytopenia with platelets at 796. Ferritin and procalcitonin elevated. Chest x-ray and CT chest shows no acute abnormalities. Given concern for sepsis, sepsis protocol initiated patient admitted for management. Evaluated by infectious disease, noted increased percentage of immature white blood cells which prompted oncology consult for evaluation of possible leukemia. Past Medical History:   has a past medical history of GERD (gastroesophageal reflux disease) and Patient denies medical problems. Past Surgical History:   has no past surgical history on file.      Home Medications:    Prior to Admission medications    Not on File Current Facility-Administered Medications   Medication Dose Route Frequency Provider Last Rate Last Admin    Ergocalciferol (Drisdol) 200 MCG/ML drops 2,000 Units  2,000 Units Oral Daily Lazara Sheldon MD        levothyroxine (SYNTHROID) tablet 50 mcg  50 mcg Oral Daily Lazara Sheldon MD        calcium gluconate 2000 mg in sodium chloride 100 mL  2,000 mg Intravenous Once Lazara Sheldon MD        Followed by   Yayo Bustillos calcium gluconate 1000 mg in sodium chloride 50 mL  1,000 mg Intravenous Once Lazara Sheldon MD        0.9 % sodium chloride infusion   Intravenous Continuous Caryle Roys, APRN -  mL/hr at 03/11/21 0541 New Bag at 03/11/21 0541    sodium chloride flush 0.9 % injection 10 mL  10 mL Intravenous 2 times per day Caryle Roys, APRN - CNP   Stopped at 03/10/21 0905    sodium chloride flush 0.9 % injection 10 mL  10 mL Intravenous PRN Caryle Roys, APRN - CNP        vancomycin (VANCOCIN) 1250 mg in dextrose 5 % 250 mL IVPB  1,250 mg Intravenous Q12H Ruth Matos MD   Stopped at 03/11/21 0752    vancomycin (VANCOCIN) intermittent dosing (placeholder)   Other RX Placeholder Ruth Matos MD   Stopped at 03/10/21 0300    HYDROcodone-acetaminophen (NORCO) 5-325 MG per tablet 1 tablet  1 tablet Oral Q6H PRN Evens Bumps, DO   1 tablet at 03/11/21 0355    azithromycin (ZITHROMAX) 500 mg in dextrose 5% 250 mL IVPB  500 mg Intravenous Q24H Rk Paul MD   Stopped at 03/10/21 1400    cefTRIAXone (ROCEPHIN) 2000 mg IVPB in D5W 50ml minibag  2,000 mg Intravenous Q12H Rk Paul MD   Stopped at 03/10/21 2127    multivitamin 1 tablet  1 tablet Oral Daily Evens Bumps, DO        thiamine tablet 100 mg  100 mg Oral Daily Evens Bumps, DO        LORazepam (ATIVAN) injection 1 mg  1 mg Intravenous Q4H PRN Evens Bumps, DO        pantoprazole (PROTONIX) tablet 40 mg  40 mg Oral QAM AC Evens Bumps, DO   40 mg at 03/11/21 3120    potassium chloride (KLOR-CON M) extended release tablet 40 mEq  40 mEq Oral PRN Lawayne Duff, DO        Or    potassium bicarb-citric acid (EFFER-K) effervescent tablet 40 mEq  40 mEq Oral PRN Lawayne Duff, DO        Or    potassium chloride 10 mEq/100 mL IVPB (Peripheral Line)  10 mEq Intravenous PRN Lawayne Duff, DO        influenza quadrivalent split vaccine (FLUZONE;FLUARIX;FLULAVAL;AFLURIA) injection 0.5 mL  0.5 mL Intramuscular Prior to discharge Lawayne Duff, DO        sodium chloride flush 0.9 % injection 10 mL  10 mL Intravenous Once Kay Cevallos MD           Allergies:  Patient has no known allergies. Social History:   reports that he has quit smoking. His smoking use included cigarettes. He smoked 1.00 pack per day. He has never used smokeless tobacco. He reports current alcohol use. He reports previous drug use. Drug: Cocaine. Family History: family history includes Hypertension in his father and mother. REVIEW OF SYSTEMS:      Constitutional: No fever or chills. No night sweats, no weight loss   Eyes: No eye discharge, double vision, or eye pain   HEENT: negative for sore mouth, sore throat, hoarseness and voice change   Respiratory: negative for cough , sputum, dyspnea, wheezing, hemoptysis, chest pain   Cardiovascular: negative for chest pain, dyspnea, palpitations, orthopnea, PND   Gastrointestinal: negative for nausea, vomiting, diarrhea, constipation, or  abdominal pain   Genitourinary: negative for frequency, dysuria, nocturia, urinary incontinence, and hematuria   Hematologic/Lymphatic: negative for easy bruising, bleeding, petechiae or lymphadenopathy  Endocrine: negative for heat or cold intolerance,   Musculoskeletal: negative for myalgias, arthralgias, pain, joint swelling,and bone pain   Neurological: negative for headaches, dizziness, seizures, weakness, or numbness  Integument: negative for rash, skin lesions, bruises.     PHYSICAL EXAM:        Vitals: BP (!) 101/58   Pulse 80   Temp 98.2 °F (36.8 °C) (Oral)   Resp 20   Ht 6' 5\" (1.956 m)   Wt 224 lb 13.9 oz (102 kg)   SpO2 98%   BMI 26.67 kg/m²     General appearance - well appearing, not in pain or distress, not jaundiced   Mental status - alert and cooperative   Eyes - pupils equal and reactive, extraocular eye movements intact   Ears - bilateral TM's and external ear canals normal   Mouth - mucous membranes moist, pharynx normal without lesions,   Neck - supple, no palpable  adenopathy , trach midline   Lymphatics - no palpable lymphadenopathy, no hepatosplenomegaly   Chest - clear to auscultation, no wheezes, rales or rhonchi, symmetric air entry , normal chest expansion  Heart - normal rate, regular rhythm, normal S1, S2, no murmurs,  Abdomen - soft, nontender, nondistended, no masses or organomegaly   Neurological - alert, oriented, normal speech, no focal findings or movement disorder noted   Musculoskeletal - no joint tenderness, deformity or swelling   Extremities - peripheral pulses normal, no pedal edema, no clubbing or cyanosis   Skin - normal coloration and turgor, no rashes, no suspicious skin lesions noted ,   Port: site of port not red, no tenderness    DATA:      Labs:   [unfilled]    CBC:   Recent Labs     03/10/21  0527 03/11/21  0719   WBC 1.2* 1.8*   HGB 15.1 12.3*   HCT 47.5 38.3*   PLT See Reflexed IPF Result See Reflexed IPF Result     BMP:   Recent Labs     03/10/21  0527 03/10/21  0703   NA DISREGARD RESULTS. SPECIMEN WILL BE REDRAWN. 3[address] Heena Blair SPECIMEN WILL BE REDRAWN. 4.9   CO2 DISREGARD RESULTS. SPECIMEN WILL BE REDRAWN. 23   BUN DISREGARD RESULTS. SPECIMEN WILL BE REDRAWN. 12   CREATININE DISREGARD RESULTS. SPECIMEN WILL BE REDRAWN. 1.06   LABGLOM CANNOT BE CALCULATED >60   GLUCOSE DISREGARD RESULTS.   SPECIMEN WILL BE REDRAWN. 122*     PT/INR:   Recent Labs     03/09/21  2004 03/10/21  0527   PROTIME 11.0 11.3   INR 1.0 1.1     APTT:  Recent Labs 03/09/21  2004 03/10/21  0527   APTT 30.1 32.6*     LIVER PROFILE:  Recent Labs     03/10/21  0527 03/10/21  0703   AST DISREGARD RESULTS. SPECIMEN WILL BE REDRAWN. 106*   ALT DISREGARD RESULTS. SPECIMEN WILL BE REDRAWN. 27   LABALBU DISREGARD RESULTS.   SPECIMEN WILL BE REDRAWN. 2.7*           IMPRESSION:      Patient Active Problem List   Diagnosis    Suspected COVID-19 virus infection    Other chest pain    Gastrointestinal hemorrhage    ETOH abuse    Transaminasemia    Acute dehydration    SIRS (systemic inflammatory response syndrome) (HCC)    Hematuria    Cystitis    Cocaine use    Fatty liver    GI bleed    Sepsis ([address]    Leukopenia    Thrombocytopenia (HCC)    Vitamin D deficiency    JACLYN (acute kidney injury) ([address]    Hyponatremia    Hypocalcemia    Hypokalemia    Thyroid nodule, left side    Hypoalbuminemia    Hypoxia    Fatigue     Active Hospital Problems    Diagnosis Date Noted    Leukopenia [D72.819] 03/10/2021    Thrombocytopenia ([address] [D69.6] 03/10/2021    Vitamin D deficiency [E55.9] 03/10/2021    [facility]ACLYN (acute kidney injury) ([facility] [N17.9] 03/10/2021    Hyponatremia [E87.1] 03/10/2021    Hypocalcemia [E83.51] 03/10/2021    Hypokalemia [E87.6] 03/10/2021    Thyroid nodule, left side [E04.1] 03/10/2021    Hypoalbuminemia [E88.09] 03/10/2021    Hypoxia [R09.02]     Fatigue [R53.83]     Sepsis ([address] [A41.9] 03/09/2021    Fatty liver [K76.0] 04/13/2020    ETOH abuse [F10.10] 04/12/2020    Acute dehydration [E86.0] 04/12/2020     Thrombocytopenia: Given patient's alcohol abuse and characteristic patterns on LFTs, likely from combination of alcohol use and sepsis, immature cells not too concerning  Sepsis: Possible viral infection, unknown source at this time, ID following with infectious panel  Thyroid nodule: Large nodule on left side measuring 1.7 cm seen on CT, previous CT last year showed low-density 2 cm nodule, no TSH has been done  JACLYN  Ethanol abuse  Fatty liver    RECOMMENDATIONS:  1. Given that it is unlikely leukemia, will not require acute interventions at this time, will follow blood smear  2. Antibiotic therapy as per ID  3. Given persistent thyroid nodule over 1 cm in size with minimal TSH changes FNA will be needed to rule out malignant pathology. TSH done only showed minimal elevation of 5.29, slight decrease in T4. Given that patient is actively sick with HIV infection, this is likely euthyroid sick syndrome and no thyroid replacement is needed. 4. Monitor CBC  5. HIV Ag/Ab reactive, will need confirmatory test. ID on aboard, recommendations appreciated. 6. We will continue to follow     Dwight Gonzalez MS4    I have reviewed the necessary labs and made appropriate changes to plan. Joe Milner MD PGY 3    Attending Physician Statement  The patient was seen and examined during rounds, I have discussed the care of Rayray Linda, including pertinent history and exam findings with the resident. I have reviewed the key elements of all parts of the encounter with the resident. I agree with the assessment, and status of the problem list as documented.    Additional assessment/ plan  At the time of attending rounds , pt was going down for MRI  Await confirmatory testing and ID recommendations       Kayden Ritchie MD  Hematology Oncology  [phone]  Electronically signed by Rona Barraza MD on 3/11/2021 at 11:14 PM

## 2021-03-11 NOTE — PROGRESS NOTES
[facility]  Office: [phone]  Jonnathan Mitchell DO, Shailesh Champagne DO, Ted Chavez DO, Vinod Weathers, DO, Inna Abbott MD, Theresa Hameed MD, Alisa Weeks MD, Sil Pineda MD, Cesilia Olmos MD, Jose Min MD, Sharma Babinski, MD, Chavo Odonnell MD, Kimmy Gottlieb MD, Juve Liu DO, Divya Mark MD, Edson Mancera DO, Vivian Escalante MD,  Jessica Castillo DO, Darren Henriquez MD, Fortino Foster MD, Lily Browning, [facility], [facility], [facility], Aristeo Cruz, CNP, Liza Jordan, CNS, Mayra Johnston, CNP, Rupa Iglesias, CNP, Vanna Dawkins, CNP, Risa Hall, CNP, Dora Streeter, CNP, Liam Desai PA-C, Allyson Adam, [facility], Charles Pineda, CNP, Samina Noel, CNP, Scott Loredo, CNP, Curtis Pemberton, CNP, Andrea Prater, CNP, Henok Tanner, [facility]    Progress Note    3/11/2021    10:57 AM    Name:   Chun Bernal  MRN:     [id number]     Acct:      [de-identified]   Room:   [address] [place] [address] Day:  2  Admit Date:  3/9/2021  7:34 PM    PCP:   No primary care provider on file. Code Status:  Full Code    Subjective:     C/C:   Chief Complaint   Patient presents with    Fatigue     x 2 days     Interval History Status: Not changed. Pt was seen and evaluated at bedside this morning. He reports feeling the same. Pt denies any new complaints today. Brief History: This is a 64year old male who came in with fatigue, shortness of breath, nausea and vomiting. Pt was noted to have sepsis, alcohol abuse, leukopenia, fatty liver and multiple labs abnormalities.      Review of Systems:     Constitutional:  negative for chills, fevers, sweats  Respiratory:  negative for cough, dyspnea on exertion, shortness of breath, wheezing  Cardiovascular:  negative for chest pain, chest pressure/discomfort, lower extremity edema, palpitations  Gastrointestinal:  negative for abdominal pain, constipation, diarrhea, nausea, vomiting Neurological:  negative for dizziness, headache    Medications: Allergies:  No Known Allergies    Current Meds:   Scheduled Meds:    sodium chloride flush  10 mL Intravenous 2 times per day    vancomycin  1,250 mg Intravenous Q12H    vancomycin (VANCOCIN) intermittent dosing (placeholder)   Other RX Placeholder    azithromycin  500 mg Intravenous Q24H    cefTRIAXone (ROCEPHIN) IV  2,000 mg Intravenous Q12H    multivitamin  1 tablet Oral Daily    thiamine  100 mg Oral Daily    pantoprazole  40 mg Oral QAM AC    influenza virus vaccine  0.5 mL Intramuscular Prior to discharge    sodium chloride flush  10 mL Intravenous Once     Continuous Infusions:    sodium chloride 150 mL/hr at 21 0541     PRN Meds: sodium chloride flush, HYDROcodone 5 mg - acetaminophen, LORazepam, potassium chloride **OR** potassium alternative oral replacement **OR** potassium chloride    Data:     Past Medical History:   has a past medical history of GERD (gastroesophageal reflux disease) and Patient denies medical problems. Social History:   reports that he has quit smoking. His smoking use included cigarettes. He smoked 1.00 pack per day. He has never used smokeless tobacco. He reports current alcohol use. He reports previous drug use. Drug: Cocaine. Family History:   Family History   Problem Relation Age of Onset    Hypertension Mother     Hypertension Father        Vitals:  BP (!) 101/58   Pulse 83   Temp 98.2 °F (36.8 °C) (Oral)   Resp 20   Ht 6' 5\" (1.956 m)   Wt 224 lb 13.9 oz (102 kg)   SpO2 97%   BMI 26.67 kg/m²   Temp (24hrs), Av.3 °F (36.8 °C), Min:97.9 °F (36.6 °C), Max:98.7 °F (37.1 °C)    Recent Labs     03/10/21  0150   POCGLU 153*       I/O (24Hr):     Intake/Output Summary (Last 24 hours) at 3/11/2021 1057  Last data filed at 3/11/2021 0839  Gross per 24 hour   Intake    Output 950 ml   Net -950 ml       Labs:  Hematology:  Recent Labs     03/09/21  2004 03/10/21  2787 03/10/21  0703 03/10/21  2256 03/11/21  0719   WBC 1.5* 1.2*  --   --  1.8*   RBC 4.91 5.27  --   --  4.28   HGB 14.1 15.1  --   --  12.3*   HCT 43.3 47.5  --   --  38.3*   MCV 88.2 90.1  --   --  89.5   MCH 28.7 28.7  --   --  28.7   MCHC 32.6 31.8  --   --  32.1   RDW 14.0 14.3  --   --  14.1   PLT See Reflexed IPF Result See Reflexed IPF Result  --   --  See Reflexed IPF Result   MPV NOT REPORTED NOT REPORTED  --   --  NOT REPORTED   CRP 36.5*  --  30.1* 17.0*  --    INR 1.0 1.1  --   --   --    DDIMER 12.69 6.71  --   --   --      Chemistry:  Recent Labs     03/09/21  2004 03/10/21  0527 03/10/21  0703 03/11/21  0719   * DISREGARD RESULTS. SPECIMEN WILL BE REDRAWN. 136  --    K 3.6* DISREGARD RESULTS. SPECIMEN WILL BE REDRAWN. 4.9  --    CL 96* DISREGARD RESULTS. SPECIMEN WILL BE REDRAWN. 102  --    CO2 23 DISREGARD RESULTS. SPECIMEN WILL BE REDRAWN. 23  --    GLUCOSE 101* DISREGARD RESULTS. SPECIMEN WILL BE REDRAWN. 122*  --    BUN 12 DISREGARD RESULTS. SPECIMEN WILL BE REDRAWN. 12  --    CREATININE 1.35* DISREGARD RESULTS. SPECIMEN WILL BE REDRAWN. 1.06  --    MG 1.9  --  2.0  --    ANIONGAP 11 CANNOT BE CALCULATED 11  --    LABGLOM 55* CANNOT BE CALCULATED >60  --    GFRAA >60 CANNOT BE CALCULATED >60  --    CALCIUM 7.5* DISREGARD RESULTS. SPECIMEN WILL BE REDRAWN. 7.0*  --    CAION  --  0.88*  --  0.98*   PHOS  --   --  4.0  --    PROBNP 47  --   --   --    TROPHS 17 11  --   --    CKTOTAL 2,067*  --   --   --    MYOGLOBIN 538*  --   --   --    LACTACIDWB 1.4 1.1  --   --      Recent Labs     03/09/21  2004 03/10/21  0150 03/10/21  0527 03/10/21  0703 03/11/21  0719   PROT 7.0  --  DISREGARD RESULTS. SPECIMEN WILL BE REDRAWN. 6.3*  --    LABALBU 3.4*  --  DISREGARD RESULTS. SPECIMEN WILL BE REDRAWN. 2.7*  --    TSH  --   --   --   --  5.29*   *  --  DISREGARD RESULTS. SPECIMEN WILL BE REDRAWN. 106*  --    ALT 29  --  DISREGARD RESULTS.   SPECIMEN WILL BE REDRAWN. 27  --    *  --   --  677*  -- ALKPHOS 56  --  DISREGARD RESULTS. SPECIMEN WILL BE REDRAWN. 45  --    BILITOT 0.35  --  DISREGARD RESULTS. SPECIMEN WILL BE REDRAWN. 0.25*  --    LIPASE 125*  --   --   --   --    POCGLU  --  153*  --   --   --      ABG:No results found for: POCPH, PHART, PH, POCPCO2, XNC5XGO, PCO2, POCPO2, PO2ART, PO2, POCHCO3, XUB6VSF, HCO3, NBEA, PBEA, BEART, BE, THGBART, THB, CLN3RKV, OFAV3MNU, X6HLBXOL, O2SAT, FIO2  Lab Results   Component Value Date/Time    SPECIAL NOT REPORTED 03/10/2021 02:28 AM     Lab Results   Component Value Date/Time    CULTURE NO GROWTH 03/10/2021 02:28 AM       Radiology:  Adia Dowell Lumbar Spine W Wo Contrast    Result Date: 3/10/2021  No acute abnormality or abnormal enhancement in lumbar spine. Degenerative disc disease without spinal canal narrowing. Foraminal narrowing, minimal at left L4-5 and bilateral L5-S1. Xr Chest Portable    Result Date: 3/9/2021  No acute process. Ct Chest Pulmonary Embolism W Contrast    Result Date: 3/9/2021  No evidence of pulmonary embolism or acute pulmonary abnormality. 1.7 cm left thyroid nodule. Thyroid ultrasound is recommended for further characterization if not previously performed.        Physical Examination:        General appearance:  alert, cooperative and no distress  Mental Status:  oriented to person, place and time and normal affect  Lungs:  clear to auscultation bilaterally, normal effort  Heart:  regular rate and rhythm, no murmur  Abdomen:  soft, nontender, nondistended, normal bowel sounds, no masses, hepatomegaly, splenomegaly  Extremities:  no edema, redness, tenderness in the calves  Skin:  no gross lesions, rashes, induration    Assessment:        Hospital Problems           Last Modified POA    * (Principal) Sepsis ([address] 3/10/2021 Yes    ETOH abuse 3/10/2021 Yes    Acute dehydration 3/10/2021 Yes    Fatty liver 3/10/2021 Yes    Leukopenia 3/10/2021 Yes    Thrombocytopenia ([address] 3/10/2021 Yes    Vitamin D deficiency 3/10/2021 Yes    JACLYN (acute kidney injury) ([facility] 3/10/2021 Yes    Hyponatremia 3/10/2021 Yes    Hypocalcemia 3/10/2021 Yes    Hypokalemia 3/10/2021 Yes    Thyroid nodule, left side 3/10/2021 Yes    Hypoalbuminemia 3/10/2021 Yes    Hypoxia 3/10/2021 Yes    Fatigue 3/10/2021 Yes          Plan:        Sepsis  -elevated inflammatory markers  -ON azithromycin, Rocephin, vancomycin    Hypocalcemia  - replaced    Alcohol abuse  -     Fatty liver  -monitor    Hypothyroidism  - started on synthroid    Vit D deficiency  - started on drisdol    Leukopenia  - antibiotics as per ID    Thrombocytopenia   - Monitor    HIV ag/ab positive  - ID on board    DDD  - foraminal narrowing at L4-5, L5-S1  - monitor    Thyroid nodule  - 1.7 cm left thyroid nodule  - Recommend outpatient follow up    Giuliana Santana MD  3/11/2021  10:57 AM

## 2021-03-11 NOTE — PROGRESS NOTES
Physical Therapy    Facility/Department: [facility] [[email or web address] CAR 3  Initial Assessment    NAME: Jeet Clemons  : 1965  MRN: [id number]    Date of Service: 3/11/2021  From H and P:Robby Hayward is a 64 y. o. male who presents by EMS ground with a complaint of 2 days of fatigue patient reports shortness of breath as well as nausea vomiting. Discharge Recommendations:  Home with assist PRN        Assessment   Body structures, Functions, Activity limitations: Decreased functional mobility ; Decreased sensation;Decreased coordination;Decreased strength; Increased pain  Assessment: Patient was surprisingly weak in his LEs, considering his age and normal independence level. Complaining of shortness of breath with only 35' of gait on room air. Patient needs further PT to regain functional independence. Prognosis: Good  Decision Making: Medium Complexity  Clinical Presentation: evolving  PT Education: Goals;Transfer Training;Plan of Care;General Safety;Gait Training; Injury Prevention; Functional Mobility Training  REQUIRES PT FOLLOW UP: Yes  Activity Tolerance  Activity Tolerance: Patient limited by fatigue;Patient limited by pain; Patient limited by endurance       Patient Diagnosis(es): The primary encounter diagnosis was Hypoxia. Diagnoses of Fatigue, unspecified type, Generalized weakness, and Non-traumatic rhabdomyolysis were also pertinent to this visit. has a past medical history of GERD (gastroesophageal reflux disease) and Patient denies medical problems. has no past surgical history on file.     Restrictions  Restrictions/Precautions  Restrictions/Precautions: Up as Tolerated(up with assist)  Required Braces or Orthoses?: No  Position Activity Restriction  Other position/activity restrictions: MRSA, up with assist          Subjective  General  Chart Reviewed: Yes  Patient assessed for rehabilitation services?: Yes  Family / Caregiver Present: No  Follows Commands: Within Functional Limits  Pain Screening Gait Deviations: Slow Agustina  Distance: 35'  Comments: Complaining of shortness of breath upon exiting room door. Generalized complaints of pain and  tired also. Balance  Sitting - Static: Good  Sitting - Dynamic: Good  Standing - Static: Fair  Standing - Dynamic: Fair;-        Plan   Plan  Times per week: 5-6x/wk  Current Treatment Recommendations: Gait Training, Strengthening, Stair training, Functional Mobility Training, Endurance Training, Home Exercise Program, Transfer Training, Safety Education & Training, Patient/Caregiver Education & Training  Safety Devices  Type of devices: All fall risk precautions in place, Call light within reach, Gait belt, Nurse notified              AM-PAC Score  AM-PAC Inpatient Mobility Raw Score : 22 (03/11/21 1317)  AM-PAC Inpatient T-Scale Score : 53.28 (03/11/21 1317)  Mobility Inpatient CMS 0-100% Score: 20.91 (03/11/21 1317)  Mobility Inpatient CMS G-Code Modifier : Timmy Oneill (03/11/21 1317)          Goals  Short term goals  Time Frame for Short term goals: 14 visits  Short term goal 1: Ambulate 150' without device with SBA. Short term goal 2: Negotiate up and down 4 steps with one railing with CGA  Short term goal 3: Improve LE strength to support function as seen by completion of 15 reps standing LE exercise.        Therapy Time   Individual Concurrent Group Co-treatment   Time In 1020         Time Out 1055         Minutes 35         Timed Code Treatment Minutes: 22 Minutes       Devendra Stouto, PT

## 2021-03-11 NOTE — PLAN OF CARE
Nutrition Problem #1: Inadequate oral intake  Intervention: Food and/or Nutrient Delivery: Continue Current Diet, Start Oral Nutrition Supplement  Nutritional Goals: Pt to meet % of est'd needs via PO

## 2021-03-11 NOTE — PROGRESS NOTES
PT HAD LP IN IR TODAY. 8 ML OF PINK TINGED CSF COLLECTED AND SENT TO LAB. BANDAID TO NEEDLE SITE. NO BLEEDING OR HEMATOMA. REPORT TO FLOOR. DISCHARGED STABLE.

## 2021-03-11 NOTE — BRIEF OP NOTE
Brief Postoperative Note Lumbar Puncture    Robby Blanc  YOB: 1965  [id number]    Pre-operative Diagnosis: Sepsis    Post-operative Diagnosis: Same    Procedure: Fluoro guided Lumbar Puncture    Anesthesia: 1% Lidocaine    Surgeons/Assistants: Scot Elias PA-C    Complications: None    EBL: Minimal    Specimens: Obtained and sent to lab    Fluoroscopy guided lumbar puncture. 20 gauge spinal needle. L3-L4. 8 ml pink tinged CSF obtained. Dressing applied. Instructions given.     Electronically signed by LULU Rodríguez on 3/11/2021 at 2:47 PM

## 2021-03-11 NOTE — PROGRESS NOTES
broad coverage. BC ordered  UA non ionfective    Has a LBP and very irritable on exam but appropriate - oriented - does not remember the time frame he was sick - has a frontal headache but no photophobia. Interval changes  3/11/2021   Patient Vitals for the past 8 hrs:   BP Temp Temp src Pulse Resp SpO2 Height Weight   03/11/21 1151       6' 5\" (1.956 m)    03/11/21 1110    80  98 %     03/11/21 0800    83       03/11/21 0633 (!) 101/58 98.2 °F (36.8 °C) Oral 74 20 97 %     03/11/21 0550        224 lb 13.9 oz (102 kg)   no fever  Seems to very poorly concentrate again - approriate - very poor recent memory    infl markers still normal contrasting w neutropenia  HIV back and is +  MRI LS Osteoarthritis - no infection  All cx neg    Summary of relevant labs:  Labs:  W 1.2 -1.9    CRP 30  CK 2067    Legionella Pneumophilia Ag, UrineNEGATIVE   Fbzhctinfy616   Procalcitonin0. 16High     Creat 1.35-1.06  DDimer 12-6.7  Micro:  covid rapid neg  UA not infective  HIV + screen  resp PCR neg  leguinella U AG neg    Imaging:  MRI LS No acute abnormality or abnormal enhancement in lumbar spine. Degenerative disc disease without spinal canal narrowing. Foraminal narrowing, minimal at left L4-5 and bilateral L5-S1     CT chest - no PE - personally reviewed and no ground glass or pneumonia  - hence not a picture of covid    I have personally reviewed the past medical history, past surgical history, medications, social history, and family history, and I haveupdated the database accordingly. Allergies:   Patient has no known allergies. Review of Systems:     Review of Systems   Constitutional: Positive for activity change, fatigue and fever. HENT: Negative for congestion. Eyes: Negative for itching. Respiratory: Negative for apnea. Cardiovascular: Negative for chest pain. Gastrointestinal: Negative for abdominal distention. Endocrine: Negative for heat intolerance. Genitourinary: Negative for dysuria and flank pain. Musculoskeletal: Positive for back pain. Negative for arthralgias. Skin: Negative for color change. Allergic/Immunologic: Negative for food allergies. Neurological: Positive for headaches. Negative for dizziness and seizures. Hematological: Negative for adenopathy. Bruises/bleeds easily. Psychiatric/Behavioral: Positive for decreased concentration. Negative for agitation. Physical Examination :       Physical Exam  Constitutional:       Appearance: Normal appearance. He is ill-appearing. HENT:      Head: Normocephalic and atraumatic. Nose: Nose normal.      Mouth/Throat:      Mouth: Mucous membranes are moist.   Eyes:      General: No scleral icterus. Conjunctiva/sclera: Conjunctivae normal.   Neck:      Musculoskeletal: Neck supple. No neck rigidity or muscular tenderness. Cardiovascular:      Rate and Rhythm: Normal rate and regular rhythm. Heart sounds: Normal heart sounds. No murmur. Pulmonary:      Effort: No respiratory distress. Breath sounds: Normal breath sounds. Abdominal:      General: There is no distension. Palpations: Abdomen is soft. Tenderness: There is no abdominal tenderness. Genitourinary:     Comments: No diaz  Musculoskeletal:         General: No swelling or deformity. Skin:     General: Skin is dry. Coloration: Skin is not jaundiced or pale. Findings: No erythema. Neurological:      General: No focal deficit present. Mental Status: He is alert. Cranial Nerves: No cranial nerve deficit. Psychiatric:         Mood and Affect: Mood normal.         Thought Content:  Thought content normal.      Comments: Very irritable but making sense - poor historian for the details of the illness but appropriate otherwise         Past Medical History:     Past Medical History:   Diagnosis Date    GERD (gastroesophageal reflux disease)     Patient denies medical problems Past Surgical  History:   History reviewed. No pertinent surgical history.     Medications:      Ergocalciferol  2,000 Units Oral Daily    levothyroxine  50 mcg Oral Daily    calcium gluconate  2,000 mg Intravenous Once    Followed by   [address] [place] [address] Dhaval calcium gluconate-NaCl  1,000 mg Intravenous Once    vancomycin  1,500 mg Intravenous Q12H    sodium chloride flush  10 mL Intravenous 2 times per day    vancomycin (VANCOCIN) intermittent dosing (placeholder)   Other RX Placeholder    azithromycin  500 mg Intravenous Q24H    cefTRIAXone (ROCEPHIN) IV  2,000 mg Intravenous Q12H    multivitamin  1 tablet Oral Daily    thiamine  100 mg Oral Daily    pantoprazole  40 mg Oral QAM AC    influenza virus vaccine  0.5 mL Intramuscular Prior to discharge    sodium chloride flush  10 mL Intravenous Once       Social History:     Social History     Socioeconomic History    Marital status: Single     Spouse name: Not on file    Number of children: Not on file    Years of education: Not on file    Highest education level: Not on file   Occupational History    Not on file   Social Needs    Financial resource strain: Not on file    Food insecurity     Worry: Not on file     Inability: Not on file   [employer] needs     Medical: Not on file     Non-medical: Not on file   Tobacco Use    Smoking status: Former Smoker     Packs/day: 1.00     Types: Cigarettes    Smokeless tobacco: Never Used   Substance and Sexual Activity    Alcohol use: Yes     Comment: pt states he \"drinks beer once a week\"    Drug use: Not Currently     Types: Cocaine    Sexual activity: Not Currently   Lifestyle    Physical activity     Days per week: Not on file     Minutes per session: Not on file    Stress: Not on file   Relationships    Social connections     Talks on phone: Not on file     Gets together: Not on file     Attends Hindu service: Not on file     Active member of club or organization: Not on file     Attends meetings of clubs 03/10/2021       Detailed results: Thank you for allowing us to participate in the care of this patient. Please call with questions. This note is created with the assistance of a speech recognition program.  While intending to generate adocument that actually reflects the content of the visit, the document can still have some errors including those of syntax and sound a like substitutions which may escape proof reading. It such instances, actual meaningcan be extrapolated by contextual diversion.     Timothy Peraza MD  Office: [phone]  Perfect serve / office [phone]

## 2021-03-11 NOTE — PROGRESS NOTES
Pt signed a release to leave the unit and get \"fresh air\". Left unit at 2142. Pt was able to walk around in his room independently. He left the unit independently on a wheelchair.

## 2021-03-11 NOTE — PLAN OF CARE
Problem: Falls - Risk of:  Goal: Will remain free from falls  Description: Will remain free from falls  3/11/2021 0957 by Jeannette Acosta RN  Outcome: Ongoing  3/11/2021 0741 by Abiodun Verdin RN  Outcome: Ongoing  Goal: Absence of physical injury  Description: Absence of physical injury  3/11/2021 0957 by Jeannette Acosta RN  Outcome: Ongoing  3/11/2021 0741 by Abiodun Verdin RN  Outcome: Ongoing     Problem: Pain:  Description: Pain management should include both nonpharmacologic and pharmacologic interventions.   Goal: Pain level will decrease  Description: Pain level will decrease  3/11/2021 0957 by Jeannette Acosta RN  Outcome: Ongoing  3/11/2021 0741 by Abiodun Verdin RN  Outcome: Ongoing  Goal: Control of acute pain  Description: Control of acute pain  3/11/2021 0957 by Jeannette Acosta RN  Outcome: Ongoing  3/11/2021 0741 by Abiodun Verdin RN  Outcome: Ongoing  Goal: Control of chronic pain  Description: Control of chronic pain  3/11/2021 0957 by Jeannette Acosta RN  Outcome: Ongoing  3/11/2021 0741 by Abiodun Verdin RN  Outcome: Ongoing

## 2021-03-12 LAB
% NK (CD56): 3 % (ref 6–29)
AB NK (CD56): 24 /UL (ref 90–600)
ABSOLUTE CD 3: 673 /UL (ref 411–2061)
ABSOLUTE CD 4 HELPER: 515 /UL (ref 309–1571)
ABSOLUTE CD 8 (SUPP): 150 /UL (ref 282–999)
ABSOLUTE CD19 B CELL: 71 /UL (ref 71–567)
ABSOLUTE EOS #: 0 K/UL (ref 0–0.4)
ABSOLUTE IMMATURE GRANULOCYTE: 0 K/UL (ref 0–0.3)
ABSOLUTE LYMPH #: 0.85 K/UL (ref 1–4.8)
ABSOLUTE MONO #: 0.09 K/UL (ref 0.1–0.8)
ANION GAP SERPL CALCULATED.3IONS-SCNC: 10 MMOL/L (ref 9–17)
BANDS, CSF: NORMAL %
BASO CSF: NORMAL %
BASOPHILS # BLD: 0 % (ref 0–2)
BASOPHILS ABSOLUTE: 0 K/UL (ref 0–0.2)
BLAST CSF: NORMAL %
BUN BLDV-MCNC: 9 MG/DL (ref 6–20)
BUN/CREAT BLD: ABNORMAL (ref 9–20)
C-REACTIVE PROTEIN: 7.5 MG/L (ref 0–5)
CALCIUM SERPL-MCNC: 7.3 MG/DL (ref 8.6–10.4)
CD19 B CELL: 9 % (ref 5–25)
CD3 % TOTAL T CELLS: 85 % (ref 57–94)
CD4 % HELPER T CELL: 65 % (ref 27–64)
CD4:CD8: 3.42 (ref 0.6–2.8)
CD8 % SUPPRESSOR T CELL: 19 % (ref 17–48)
CHLORIDE BLD-SCNC: 102 MMOL/L (ref 98–107)
CO2: 21 MMOL/L (ref 20–31)
CREAT SERPL-MCNC: 1.05 MG/DL (ref 0.7–1.2)
CRYPTOCOCCAL ANTIGEN CSF: NEGATIVE
CRYPTOCOCCAL ANTIGEN: NEGATIVE
DIFFERENTIAL TYPE: ABNORMAL
EOS CSF: NORMAL %
EOSINOPHILS RELATIVE PERCENT: 0 % (ref 1–4)
FLUID DIFF COMMENT: NORMAL
GFR AFRICAN AMERICAN: >60 ML/MIN
GFR NON-AFRICAN AMERICAN: >60 ML/MIN
GFR SERPL CREATININE-BSD FRML MDRD: ABNORMAL ML/MIN/{1.73_M2}
GFR SERPL CREATININE-BSD FRML MDRD: ABNORMAL ML/MIN/{1.73_M2}
GLUCOSE BLD-MCNC: 84 MG/DL (ref 70–99)
HCT VFR BLD CALC: 38 % (ref 40.7–50.3)
HEMOGLOBIN: 12 G/DL (ref 13–17)
IMMATURE GRANULOCYTES: 0 %
LACTIC ACID, SEPSIS WHOLE BLOOD: 1.2 MMOL/L (ref 0.5–1.9)
LACTIC ACID, SEPSIS: NORMAL MMOL/L (ref 0.5–1.9)
LYMPHOCYTES # BLD: 44 % (ref 24–44)
LYMPHOCYTES # BLD: 57 % (ref 24–44)
LYMPHS CSF: 22 %
MCH RBC QN AUTO: 28.5 PG (ref 25.2–33.5)
MCHC RBC AUTO-ENTMCNC: 31.6 G/DL (ref 28.4–34.8)
MCV RBC AUTO: 90.3 FL (ref 82.6–102.9)
METAYELO CSF: NORMAL %
MONO/MACROPHAGE CSF (MANUAL): NORMAL %
MONOCYTES # BLD: 6 % (ref 1–7)
MORPHOLOGY: NORMAL
MYCOPLASMA PNEUMONIAE IGM: 0.57
MYELOCYTE CSF: NORMAL %
NEUTROPHILS, CSF: 32 %
NRBC AUTOMATED: 0 PER 100 WBC
OTHER CELLS FLUID: NORMAL %
PATHOLOGIST REVIEW: NORMAL
PDW BLD-RTO: 14.2 % (ref 11.8–14.4)
PLATELET # BLD: 106 K/UL (ref 138–453)
PLATELET ESTIMATE: ABNORMAL
PMV BLD AUTO: 12.5 FL (ref 8.1–13.5)
POTASSIUM SERPL-SCNC: 3.7 MMOL/L (ref 3.7–5.3)
PROCALCITONIN: 0.11 NG/ML
RBC # BLD: 4.21 M/UL (ref 4.21–5.77)
RBC # BLD: ABNORMAL 10*6/UL
SEG NEUTROPHILS: 37 % (ref 36–66)
SEGMENTED NEUTROPHILS ABSOLUTE COUNT: 0.56 K/UL (ref 1.8–7.7)
SODIUM BLD-SCNC: 133 MMOL/L (ref 135–144)
SURGICAL PATHOLOGY REPORT: NORMAL
WBC # BLD: 1.5 K/UL (ref 3.5–11.3)
WBC # BLD: 1.8 K/UL (ref 3.5–11)
WBC # BLD: ABNORMAL 10*3/UL

## 2021-03-12 PROCEDURE — 82746 ASSAY OF FOLIC ACID SERUM: CPT

## 2021-03-12 PROCEDURE — 99233 SBSQ HOSP IP/OBS HIGH 50: CPT | Performed by: INTERNAL MEDICINE

## 2021-03-12 PROCEDURE — 83605 ASSAY OF LACTIC ACID: CPT

## 2021-03-12 PROCEDURE — 2580000003 HC RX 258: Performed by: NURSE PRACTITIONER

## 2021-03-12 PROCEDURE — 86618 LYME DISEASE ANTIBODY: CPT

## 2021-03-12 PROCEDURE — 2060000000 HC ICU INTERMEDIATE R&B

## 2021-03-12 PROCEDURE — 85025 COMPLETE CBC W/AUTO DIFF WBC: CPT

## 2021-03-12 PROCEDURE — 36415 COLL VENOUS BLD VENIPUNCTURE: CPT

## 2021-03-12 PROCEDURE — 2580000003 HC RX 258: Performed by: INTERNAL MEDICINE

## 2021-03-12 PROCEDURE — 86747 PARVOVIRUS ANTIBODY: CPT

## 2021-03-12 PROCEDURE — 86038 ANTINUCLEAR ANTIBODIES: CPT

## 2021-03-12 PROCEDURE — 6370000000 HC RX 637 (ALT 250 FOR IP): Performed by: INTERNAL MEDICINE

## 2021-03-12 PROCEDURE — 84145 PROCALCITONIN (PCT): CPT

## 2021-03-12 PROCEDURE — 95816 EEG AWAKE AND DROWSY: CPT

## 2021-03-12 PROCEDURE — 99254 IP/OBS CNSLTJ NEW/EST MOD 60: CPT | Performed by: PSYCHIATRY & NEUROLOGY

## 2021-03-12 PROCEDURE — 80048 BASIC METABOLIC PNL TOTAL CA: CPT

## 2021-03-12 PROCEDURE — 2500000003 HC RX 250 WO HCPCS: Performed by: INTERNAL MEDICINE

## 2021-03-12 PROCEDURE — 97530 THERAPEUTIC ACTIVITIES: CPT

## 2021-03-12 PROCEDURE — 82607 VITAMIN B-12: CPT

## 2021-03-12 PROCEDURE — 6360000002 HC RX W HCPCS: Performed by: INTERNAL MEDICINE

## 2021-03-12 PROCEDURE — 86140 C-REACTIVE PROTEIN: CPT

## 2021-03-12 PROCEDURE — 99232 SBSQ HOSP IP/OBS MODERATE 35: CPT | Performed by: INTERNAL MEDICINE

## 2021-03-12 RX ORDER — DOXYCYCLINE HYCLATE 100 MG
100 TABLET ORAL EVERY 12 HOURS SCHEDULED
Status: DISCONTINUED | OUTPATIENT
Start: 2021-03-12 | End: 2021-03-15 | Stop reason: HOSPADM

## 2021-03-12 RX ORDER — SODIUM CHLORIDE 0.9 % (FLUSH) 0.9 %
10 SYRINGE (ML) INJECTION EVERY 12 HOURS SCHEDULED
Status: DISCONTINUED | OUTPATIENT
Start: 2021-03-12 | End: 2021-03-15 | Stop reason: HOSPADM

## 2021-03-12 RX ORDER — CALCIUM GLUCONATE 20 MG/ML
1000 INJECTION, SOLUTION INTRAVENOUS ONCE
Status: COMPLETED | OUTPATIENT
Start: 2021-03-12 | End: 2021-03-12

## 2021-03-12 RX ORDER — SODIUM CHLORIDE 0.9 % (FLUSH) 0.9 %
10 SYRINGE (ML) INJECTION PRN
Status: DISCONTINUED | OUTPATIENT
Start: 2021-03-12 | End: 2021-03-15 | Stop reason: HOSPADM

## 2021-03-12 RX ADMIN — DOXYCYCLINE HYCLATE 100 MG: 100 TABLET, COATED ORAL at 20:48

## 2021-03-12 RX ADMIN — Medication 500 MG: at 10:32

## 2021-03-12 RX ADMIN — ACYCLOVIR SODIUM 1000 MG: 50 INJECTION, SOLUTION INTRAVENOUS at 05:42

## 2021-03-12 RX ADMIN — ERGOCALCIFEROL SOLN 200 MCG/ML (8000 UNIT/ML) 2000 UNITS: 8000 SOLUTION at 07:53

## 2021-03-12 RX ADMIN — Medication 100 MG: at 07:53

## 2021-03-12 RX ADMIN — LEVOTHYROXINE SODIUM 50 MCG: 50 TABLET ORAL at 07:53

## 2021-03-12 RX ADMIN — PANTOPRAZOLE SODIUM 40 MG: 40 TABLET, DELAYED RELEASE ORAL at 07:53

## 2021-03-12 RX ADMIN — SODIUM CHLORIDE: 9 INJECTION, SOLUTION INTRAVENOUS at 22:49

## 2021-03-12 RX ADMIN — CALCIUM GLUCONATE 1000 MG: 20 INJECTION, SOLUTION INTRAVENOUS at 09:03

## 2021-03-12 RX ADMIN — ALCOHOL 1 TABLET: 70.47 GEL TOPICAL at 07:53

## 2021-03-12 RX ADMIN — SODIUM CHLORIDE: 9 INJECTION, SOLUTION INTRAVENOUS at 01:24

## 2021-03-12 ASSESSMENT — PAIN DESCRIPTION - FREQUENCY
FREQUENCY: INTERMITTENT
FREQUENCY: INTERMITTENT

## 2021-03-12 ASSESSMENT — PAIN SCALES - GENERAL
PAINLEVEL_OUTOF10: 0
PAINLEVEL_OUTOF10: 0
PAINLEVEL_OUTOF10: 8
PAINLEVEL_OUTOF10: 0
PAINLEVEL_OUTOF10: 8
PAINLEVEL_OUTOF10: 8
PAINLEVEL_OUTOF10: 0

## 2021-03-12 ASSESSMENT — PAIN DESCRIPTION - LOCATION
LOCATION: BACK;SHOULDER
LOCATION: BACK;SHOULDER

## 2021-03-12 ASSESSMENT — PAIN DESCRIPTION - PROGRESSION
CLINICAL_PROGRESSION: NOT CHANGED

## 2021-03-12 ASSESSMENT — PAIN DESCRIPTION - PAIN TYPE
TYPE: CHRONIC PAIN
TYPE: CHRONIC PAIN

## 2021-03-12 ASSESSMENT — ENCOUNTER SYMPTOMS
BACK PAIN: 1
APNEA: 0
COLOR CHANGE: 0
ABDOMINAL PAIN: 0
EYE ITCHING: 0
ABDOMINAL DISTENTION: 0

## 2021-03-12 ASSESSMENT — PAIN DESCRIPTION - ORIENTATION
ORIENTATION: RIGHT;LEFT;MID
ORIENTATION: RIGHT;LEFT;MID

## 2021-03-12 ASSESSMENT — PAIN - FUNCTIONAL ASSESSMENT
PAIN_FUNCTIONAL_ASSESSMENT: PREVENTS OR INTERFERES SOME ACTIVE ACTIVITIES AND ADLS
PAIN_FUNCTIONAL_ASSESSMENT: PREVENTS OR INTERFERES SOME ACTIVE ACTIVITIES AND ADLS

## 2021-03-12 ASSESSMENT — PAIN DESCRIPTION - ONSET
ONSET: GRADUAL

## 2021-03-12 ASSESSMENT — PAIN DESCRIPTION - DESCRIPTORS: DESCRIPTORS: ACHING;CONSTANT;DISCOMFORT

## 2021-03-12 NOTE — PROGRESS NOTES
Writer continually asked and assessed patient's pain level throughout shift. Pt denied pain until 1600. Pt claimed to have an 8/10 pain rating but refused any prn pain medication ordered. Writer also continually worked with the patient to entice patient to eat. Pt refused to eat until he was ready. Pt ate 1-25% of meals ordered. Estefany Barroso will continue to monitor patient and treat accordingly per doctor's orders and patient's wishes.

## 2021-03-12 NOTE — PROGRESS NOTES
DATE: 3/12/2021    NAME: Isabela Lima  MRN: [id number]   : 1965      Patient not seen this date for Physical Therapy due to:    Patient Declined: pt declined PT twice, despite encouragement and education.  Pt reported 8/10 back pain  ;  PTA reported pt's subjective to the nursing staff      Electronically signed by Jesusita Berumen PTA on 3/12/2021 at 3:48 PM

## 2021-03-12 NOTE — PROGRESS NOTES
[facility]  Office: [phone]  Cindy Suárez, DO, Deep Barnhart, DO, Dorene Anaya, DO, Blanca Weathers, DO, Angelica Barnes MD, Leidy Eason MD, Nataly Rowland MD, Tal Asher MD, Carina Oconnell MD, Evonne Rodriguez MD, Ralph Arellano MD, Radha Landin MD, Kimmy Aceves MD, Reshma Lilly, DO, Jeferson Mohr MD, Eva Cifuentes, DO, Kenya Harris MD,  Svitlana Montoya DO, Ngoc Kim MD, Dayne Ackerman MD, Maverick Kong, [facility], [facility], CNP, Clarke Lehman, CNP, Mary Marie, CNS, Charles Butt, CNP, Bishop Forrest, CNP, Zoey Max, CNP, Rafia Amaya, CNP, Sonya Trevino, CNP, Urbano Oswald PA-C, [employer], [facility], Conner Aguilar, CNP, Liat Arredondo, CNP, Raji Matthew, CNP, Bryce Martinez, CNP, Yoselin Blair, CNP, [facility], [address] [place] [address]    Progress Note    3/12/2021    10:42 AM    Name:   Fozia Antonio  MRN:     [id number]     Acct:      [de-identified]   Room:   [address] [place] [address] Day:  3  Admit Date:  3/9/2021  7:34 PM    PCP:   No primary care provider on file. Code Status:  Full Code    Subjective:     C/C:   Chief Complaint   Patient presents with    Fatigue     x 2 days     Interval History Status: Not changed. Pt was seen and evaluated at bedside this morning. He reports feeling the same. Pt denies any new complaints today. Brief History: This is a 64year old male who came in with fatigue, shortness of breath, nausea and vomiting. Pt was noted to have sepsis, alcohol abuse, leukopenia, fatty liver and multiple labs abnormalities.      Review of Systems:     Constitutional:  negative for chills, fevers, sweats  Respiratory:  negative for cough, dyspnea on exertion, shortness of breath, wheezing  Cardiovascular:  negative for chest pain, chest pressure/discomfort, lower extremity edema, palpitations  Gastrointestinal:  negative for abdominal pain, constipation, diarrhea, nausea, vomiting Neurological:  negative for dizziness, headache    Medications: Allergies:  No Known Allergies    Current Meds:   Scheduled Meds:    sodium chloride flush  10 mL Intravenous 2 times per day    Ergocalciferol  2,000 Units Oral Daily    levothyroxine  50 mcg Oral Daily    acyclovir  10 mg/kg Intravenous Q8H    sodium chloride flush  10 mL Intravenous 2 times per day    azithromycin  500 mg Intravenous Q24H    multivitamin  1 tablet Oral Daily    thiamine  100 mg Oral Daily    pantoprazole  40 mg Oral QAM AC    influenza virus vaccine  0.5 mL Intramuscular Prior to discharge    sodium chloride flush  10 mL Intravenous Once     Continuous Infusions:    sodium chloride 75 mL/hr at 21 0913     PRN Meds: sodium chloride flush, sodium chloride flush, sodium chloride flush, HYDROcodone 5 mg - acetaminophen, LORazepam, potassium chloride **OR** potassium alternative oral replacement **OR** potassium chloride    Data:     Past Medical History:   has a past medical history of GERD (gastroesophageal reflux disease) and Patient denies medical problems. Social History:   reports that he has quit smoking. His smoking use included cigarettes. He smoked 1.00 pack per day. He has never used smokeless tobacco. He reports current alcohol use. He reports previous drug use. Drug: Cocaine. Family History:   Family History   Problem Relation Age of Onset    Hypertension Mother     Hypertension Father        Vitals:  /72   Pulse 91   Temp 98.6 °F (37 °C) (Axillary)   Resp 21   Ht 6' 5\" (1.956 m)   Wt 224 lb 13.9 oz (102 kg)   SpO2 99%   BMI 26.67 kg/m²   Temp (24hrs), Av.1 °F (37.3 °C), Min:98.6 °F (37 °C), Max:100.3 °F (37.9 °C)    Recent Labs     03/10/21  0150   POCGLU 153*       I/O (24Hr):     Intake/Output Summary (Last 24 hours) at 3/12/2021 1042  Last data filed at 3/12/2021 0800  Gross per 24 hour   Intake 4337 ml   Output 1450 ml   Net 2887 ml       Labs:  Hematology:  Recent Labs 03/09/21  2004 03/10/21  0527 03/10/21  0703 03/10/21  2256 03/11/21  0719 03/12/21  0538   WBC 1.5* 1.2*  --   --  1.8* 1.5*   RBC 4.91 5.27  --   --  4.28 4.21   HGB 14.1 15.1  --   --  12.3* 12.0*   HCT 43.3 47.5  --   --  38.3* 38.0*   MCV 88.2 90.1  --   --  89.5 90.3   MCH 28.7 28.7  --   --  28.7 28.5   MCHC 32.6 31.8  --   --  32.1 31.6   RDW 14.0 14.3  --   --  14.1 14.2   PLT See Reflexed IPF Result See Reflexed IPF Result  --   --  See Reflexed IPF Result 106*   MPV NOT REPORTED NOT REPORTED  --   --  NOT REPORTED 12.5   CRP 36.5*  --  30.1* 17.0*  --   --    INR 1.0 1.1  --   --   --   --    DDIMER 12.69 6.71  --   --   --   --      Chemistry:  Recent Labs     03/09/21  2004 03/10/21  0527 03/10/21  0703 03/11/21  0719 03/12/21  0538   * DISREGARD RESULTS. SPECIMEN WILL BE REDRAWN. 136 134* 133*   K 3.6* DISREGARD RESULTS. SPECIMEN WILL BE REDRAWN. 4.9 3.7 3.7   CL 96* DISREGARD RESULTS. SPECIMEN WILL BE REDRAWN. 102 102 102   CO2 23 DISREGARD RESULTS. SPECIMEN WILL BE REDRAWN. 23 22 21   GLUCOSE 101* DISREGARD RESULTS. SPECIMEN WILL BE REDRAWN. 122* 112* 84   BUN 12 DISREGARD RESULTS. SPECIMEN WILL BE REDRAWN. 12 12 9   CREATININE 1.35* DISREGARD RESULTS. SPECIMEN WILL BE REDRAWN. 1.06 1.03 1.05   MG 1.9  --  2.0  --   --    ANIONGAP 11 CANNOT BE CALCULATED 11 10 10   LABGLOM 55* CANNOT BE CALCULATED >60 >60 >60   GFRAA >60 CANNOT BE CALCULATED >60 >60 >60   CALCIUM 7.5* DISREGARD RESULTS. SPECIMEN WILL BE REDRAWN. 7.0* 6.9* 7.3*   CAION  --  0.88*  --  0.98*  --    PHOS  --   --  4.0  --   --    PROBNP 47  --   --   --   --    TROPHS 17 11  --   --   --    CKTOTAL 2,067*  --   --   --   --    MYOGLOBIN 538*  --   --   --   --    LACTACIDWB 1.4 1.1  --   --   --      Recent Labs     03/09/21  2004 03/10/21  0150 03/10/21  0527 03/10/21  0703 03/11/21  0719   PROT 7.0  --  DISREGARD RESULTS. SPECIMEN WILL BE REDRAWN. 6.3*  --    LABALBU 3.4*  --  DISREGARD RESULTS.   SPECIMEN WILL BE REDRAWN. 2.7*  --    TSH  --   --   --   --  5.29*   *  --  DISREGARD RESULTS. SPECIMEN WILL BE REDRAWN. 106*  --    ALT 29  --  DISREGARD RESULTS. SPECIMEN WILL BE REDRAWN. 27  --    *  --   --  677*  --    ALKPHOS 56  --  DISREGARD RESULTS. SPECIMEN WILL BE REDRAWN. 45  --    BILITOT 0.35  --  DISREGARD RESULTS. SPECIMEN WILL BE REDRAWN. 0.25*  --    LIPASE 125*  --   --   --   --    POCGLU  --  153*  --   --   --      ABG:No results found for: POCPH, PHART, PH, POCPCO2, GZA1NAS, PCO2, POCPO2, PO2ART, PO2, POCHCO3, EXS7HGD, HCO3, NBEA, PBEA, BEART, BE, THGBART, THB, OKZ2AMM, JVJQ3LCA, U9NHCDPE, O2SAT, FIO2  Lab Results   Component Value Date/Time    SPECIAL NOT REPORTED 03/11/2021 02:59 PM     Lab Results   Component Value Date/Time    CULTURE NO GROWTH 15 HOURS 03/11/2021 02:59 PM       Radiology:  Mri Lumbar Spine W Wo Contrast    Result Date: 3/10/2021  No acute abnormality or abnormal enhancement in lumbar spine. Degenerative disc disease without spinal canal narrowing. Foraminal narrowing, minimal at left L4-5 and bilateral L5-S1. Xr Chest Portable    Result Date: 3/9/2021  No acute process. Ct Chest Pulmonary Embolism W Contrast    Result Date: 3/9/2021  No evidence of pulmonary embolism or acute pulmonary abnormality. 1.7 cm left thyroid nodule. Thyroid ultrasound is recommended for further characterization if not previously performed.        Physical Examination:        General appearance:  alert, cooperative and no distress  Mental Status:  oriented to person, place and time and normal affect  Lungs:  clear to auscultation bilaterally, normal effort  Heart:  regular rate and rhythm, no murmur  Abdomen:  soft, nontender, nondistended, normal bowel sounds, no masses, hepatomegaly, splenomegaly  Extremities:  no edema, redness, tenderness in the calves  Skin:  no gross lesions, rashes, induration    Assessment:        Hospital Problems           Last Modified POA    * (Principal) Sepsis ([facility] [address] 3/10/2021 Yes    ETOH abuse 3/10/2021 Yes    Acute dehydration 3/10/2021 Yes    Fatty liver 3/10/2021 Yes    Leukopenia 3/10/2021 Yes    Thrombocytopenia ([address] 3/10/2021 Yes    Vitamin D deficiency 3/10/2021 Yes    JACLYN (acute kidney injury) ([facility] [address] 3/10/2021 Yes    Hyponatremia 3/10/2021 Yes    Hypocalcemia 3/10/2021 Yes    Hypokalemia 3/10/2021 Yes    Thyroid nodule, left side 3/10/2021 Yes    Hypoalbuminemia 3/10/2021 Yes    Hypoxia 3/10/2021 Yes    Fatigue 3/10/2021 Yes          Plan:        Sepsis  -elevated inflammatory markers  -Vancomycin and Rocephin stopped. Continue azithromycin.   Continue Cyclovir  -MRI brain unremarkable  -Patient underwent lumbar puncture yesterday    Hypocalcemia  - replaced    Alcohol abuse  -     Fatty liver  -monitor    Hypothyroidism  - started on synthroid    Vit D deficiency  - started on drisdol    Leukopenia  - antibiotics as per ID    Thrombocytopenia   - Monitor    HIV ag/ab positive  - ID on board  -HIV Western blot pending  -CD4 and viral load ordered  -Hep BC Treponema, GC chlamydia, HLA B5 701, G6PD ordered    DDD  - foraminal narrowing at L4-5, L5-S1  - monitor    Thyroid nodule  - 1.7 cm left thyroid nodule  - Recommend outpatient follow up    Dayne Ackerman MD  3/12/2021  10:42 AM

## 2021-03-12 NOTE — PROGRESS NOTES
Pt is refusing midnight vitals. Was not able to get a BP reading. He wants to be left alone. Tele monitoring and SPO2 on.

## 2021-03-12 NOTE — CARE COORDINATION
Met with pt to discuss transitional planning. He is agreeable with plan of home. He denies any needs at discharge. He is agreeable for PCP to be established.  appt scheduled with Denise Louise at [facility] on 3/24 at 2pm

## 2021-03-12 NOTE — PROGRESS NOTES
tablet 40 mEq  40 mEq Oral PRN Henrik Fickle, DO        Or    potassium bicarb-citric acid (EFFER-K) effervescent tablet 40 mEq  40 mEq Oral PRN Henrik Fickle, DO        Or    potassium chloride 10 mEq/100 mL IVPB (Peripheral Line)  10 mEq Intravenous PRN Henrik Fickle, DO        influenza quadrivalent split vaccine (FLUZONE;FLUARIX;FLULAVAL;AFLURIA) injection 0.5 mL  0.5 mL Intramuscular Prior to discharge Henrik Fickianae, DO        sodium chloride flush 0.9 % injection 10 mL  10 mL Intravenous Once Jayro Sultana MD           Allergies:  Patient has no known allergies. Social History:   reports that he has quit smoking. His smoking use included cigarettes. He smoked 1.00 pack per day. He has never used smokeless tobacco. He reports current alcohol use. He reports previous drug use. Drug: Cocaine. Family History: family history includes Hypertension in his father and mother. REVIEW OF SYSTEMS:      Constitutional: No fever or chills. No night sweats, no weight loss   Eyes: No eye discharge, double vision, or eye pain   HEENT: negative for sore mouth, sore throat, hoarseness and voice change   Respiratory: negative for cough , sputum, dyspnea, wheezing, hemoptysis, chest pain   Cardiovascular: negative for chest pain, dyspnea, palpitations, orthopnea, PND   Gastrointestinal: negative for nausea, vomiting, diarrhea, constipation, or  abdominal pain   Genitourinary: negative for frequency, dysuria, nocturia, urinary incontinence, and hematuria   Hematologic/Lymphatic: negative for easy bruising, bleeding, petechiae or lymphadenopathy  Endocrine: negative for heat or cold intolerance,   Musculoskeletal: negative for myalgias, arthralgias, pain, joint swelling,and bone pain   Neurological: negative for headaches, dizziness, seizures, weakness, or numbness  Integument: negative for rash, skin lesions, bruises.     PHYSICAL EXAM:        Vitals:  /86   Pulse 89   Temp 98.6 °F (37 °C) (Axillary)   Resp 13   Ht 6' 5\" (1.956 m)   Wt 224 lb 13.9 oz (102 kg)   SpO2 (!) 85%   BMI 26.67 kg/m²     General appearance - well appearing, not in pain or distress, not jaundiced   Mental status - alert and cooperative   Eyes - pupils equal and reactive, extraocular eye movements intact   Ears - bilateral TM's and external ear canals normal   Mouth - mucous membranes moist, pharynx normal without lesions,   Neck - supple, no palpable  adenopathy , trach midline   Lymphatics - no palpable lymphadenopathy, no hepatosplenomegaly   Chest - clear to auscultation, no wheezes, rales or rhonchi, symmetric air entry , normal chest expansion  Heart - normal rate, regular rhythm, normal S1, S2, no murmurs,  Abdomen - soft, nontender, nondistended, no masses or organomegaly   Neurological - alert, oriented, normal speech, no focal findings or movement disorder noted   Musculoskeletal - no joint tenderness, deformity or swelling   Extremities - peripheral pulses normal, no pedal edema, no clubbing or cyanosis   Skin - normal coloration and turgor, no rashes, no suspicious skin lesions noted ,   Port: site of port not red, no tenderness    DATA:      Labs:   [unfilled]    CBC:   Recent Labs     03/11/21  0719 03/11/21  1517 03/12/21  0538   WBC 1.8* 1.8* 1.5*   HGB 12.3*  --  12.0*   HCT 38.3*  --  38.0*   PLT See Reflexed IPF Result  --  106*     BMP:   Recent Labs     03/11/21  0719 03/12/21  0538   * 133*   K 3.7 3.7   CO2 22 21   BUN 12 9   CREATININE 1.03 1.05   LABGLOM >60 >60   GLUCOSE 112* 84     PT/INR:   Recent Labs     03/09/21  2004 03/10/21  0527   PROTIME 11.0 11.3   INR 1.0 1.1     APTT:  Recent Labs     03/09/21  2004 03/10/21  0527   APTT 30.1 32.6*     LIVER PROFILE:  Recent Labs     03/10/21  0527 03/10/21  0703   AST DISREGARD RESULTS. SPECIMEN WILL BE REDRAWN. 106*   ALT DISREGARD RESULTS. SPECIMEN WILL BE REDRAWN. 27   LABALBU DISREGARD RESULTS.   SPECIMEN WILL BE REDRAWN. 2.7* IMPRESSION:      Patient Active Problem List   Diagnosis    Suspected COVID-19 virus infection    Other chest pain    Gastrointestinal hemorrhage    ETOH abuse    Transaminasemia    Acute dehydration    SIRS (systemic inflammatory response syndrome) (HCC)    Hematuria    Cystitis    Cocaine use    Fatty liver    GI bleed    Sepsis ([address]    Leukopenia    Thrombocytopenia (HCC)    Vitamin D deficiency    JACLYN (acute kidney injury) ([address]    Hyponatremia    Hypocalcemia    Hypokalemia    Thyroid nodule, left side    Hypoalbuminemia    Hypoxia    Fatigue     Active Hospital Problems    Diagnosis Date Noted    Leukopenia [D72.819] 03/10/2021    Thrombocytopenia ([address] [D69.6] 03/10/2021    Vitamin D deficiency [E55.9] 03/10/2021    JACLYN (acute kidney injury) ([address] [N17.9] 03/10/2021    Hyponatremia [E87.1] 03/10/2021    Hypocalcemia [E83.51] 03/10/2021    Hypokalemia [E87.6] 03/10/2021    Thyroid nodule, left side [E04.1] 03/10/2021    Hypoalbuminemia [E88.09] 03/10/2021    Hypoxia [R09.02]     Fatigue [R53.83]     Sepsis ([address] [A41.9] 03/09/2021    Fatty liver [K76.0] 04/13/2020    ETOH abuse [F10.10] 04/12/2020    Acute dehydration [E86.0] 04/12/2020     Thrombocytopenia: Given patient's alcohol abuse and characteristic patterns on LFTs, likely from combination of alcohol use and sepsis, immature cells not too concerning  Sepsis: Possible viral infection, unknown source at this time, ID following with infectious panel  Thyroid nodule: Large nodule on left side measuring 1.7 cm seen on CT, previous CT last year showed low-density 2 cm nodule, no TSH has been done  JACLYN  Ethanol abuse  Fatty liver    RECOMMENDATIONS:  1. Given that it is unlikely leukemia, will not require acute interventions at this time, will follow blood smear  2. Antibiotic therapy as per ID  3.  Given persistent thyroid nodule over 1 cm in size with minimal TSH changes FNA will be needed to rule out malignant pathology. TSH done only showed minimal elevation of 5.29, slight decrease in T4.    4. Continue to monitor CBC  5. HIV 1/2 Ab confirmatory test Negative, HIV western blotting and viral load counts ordered by ID. 6. We will continue to follow     Luara Machuca, MS4    I have reviewed the necessary labs and made appropriate changes to plan. Electronically signed by Laura Machuca on 3/12/2021 at 1:04 PM    Attending Physician Statement   I have discussed the care of Linda Luo, including pertinent history and exam findings with the resident. I have reviewed the key elements of all parts of the encounter with the resident. I have seen and examined the patient with the resident. I agree with the assessment and plan and status of the problem list as documented. Discussed with ID. Still no clear explanation for clinical picture or labs. Still considering underlying viral etiology. Unlikely dealing with malignancy or leukemia. However if leukopenia persists we may consider bone marrow testing.                             [facility]

## 2021-03-12 NOTE — CONSULTS
[facility] Neurology   IN-PATIENT SERVICE      NEUROLOGY CONSULT  NOTE            Date:   3/12/2021  Patient name:  Rayray Linda  Date of admission:  3/9/2021  YOB: 1965      Chief Complaint:     Chief Complaint   Patient presents with    Fatigue     x 2 days        Reason for Consult:      Encephalopathy versus hypothyroidism versus neutropenia    History of Present Illness: The patient is a 64 y.o. male who presents with Fatigue (x 2 days)   and he is admitted to the hospital for the management of weakness of the is lightheaded fatigue,, fever of unknown origin. The patient was seen and examined and the chart was reviewed. 70-year-old male without significant past medical history comes into the ER for fatigue and shortness of breath for past couple of days. Associated with nausea and vomiting, myalgia, weakness poor appetite. Arrival to the ER patient found to be febrile with temp of 101.2, heart rate 94, respiratory 21. Abnormal lab: WBC 1.2, platelets 532  , ALT 27,  Elevated pro-Andrew and ferritin. Chest x-ray, CT chest no acute abnormalities. Thyroid nodule of 1.7 cm seen on previous CT    Examination patient noted to have right upper extremity tremor, exacerbated by stretching. Patient stated that he has some balance issue while walking. Lindajean Sheng has been going on for 1 year. Patient has a flat affect  Glucose 62, protein CSF 61.8  WBC 6, RBC 3000, neutrophils 32   Past Medical History:     Past Medical History:   Diagnosis Date    GERD (gastroesophageal reflux disease)     Patient denies medical problems         Past Surgical History:     History reviewed. No pertinent surgical history. Medications Prior to Admission:     Prior to Admission medications    Not on File        Allergies:     Patient has no known allergies. Social History:     Tobacco:    reports that he has quit smoking. His smoking use included cigarettes. He smoked 1.00 pack per day.  He has never used smokeless tobacco.  Alcohol:      reports current alcohol use. Drug Use:  reports previous drug use. Drug: Cocaine. Family History:     Family History   Problem Relation Age of Onset    Hypertension Mother     Hypertension Father        Review of Systems:       Constitutional Negative for fever and chills   HEENT Negative for ear discharge, ear pain, nosebleed   Eyes Negative for photophobia, pain and discharge   Respiratory Negative for hemoptysis and sputum   Cardiovascular Negative for orthopnea, claudication and PND   Gastrointestinal Negative for abdominal pain, diarrhea, blood in stool   Musculoskeletal Negative for joint pain, negative for myalgia   Skin Negative for rash or itching   hematology Negative for ecchymosis, anemia   Psychiatric Negative for suicidal ideation, anxiety, depression, hallucinations       Physical Exam:   /86   Pulse 89   Temp 98.6 °F (37 °C) (Axillary)   Resp 13   Ht 6' 5\" (1.956 m)   Wt 224 lb 13.9 oz (102 kg)   SpO2 (!) 85%   BMI 26.67 kg/m²   Temp (24hrs), Av.2 °F (37.3 °C), Min:98.6 °F (37 °C), Max:100.3 °F (37.9 °C)        General examination:      General Appearance:  alert, well appearing, and in no acute distress  HEENT: Normocephalic, atraumatic, moist mucus membranes  Neck: supple, no carotid bruits, (-) nuchal rigidity  Lungs:  Respirations unlabored, chest wall no deformity, BS normal  Cardiovascular: normal rate, regular rhythm  Abdomen: Soft, nontender, nondistended, normal bowel sounds  Skin: No gross lesions, rashes, bruising or bleeding on exposed skin area  Extremities:  peripheral pulses palpable, clubbing or edema  Psych: normal affect    NEUROLOGIC EXAMINATION    Mental status   Alert and oriented x 3; following all commands;   speech is fluent, no dysarthria, aphasia.       Cranial nerves   II - visual fields intact to confrontation; pupils reactive  III, IV, VI  extraocular muscles intact; no TOPHER; no nystagmus; no ptosis V - normal facial sensation                                                               VII -mild left nasal fold flatting                                                           VIII - intact hearing                                                                             IX, X - symmetrical palate elevation                                               XI - symmetrical shoulder shrug                                                       XII - midline tongue without atrophy or fasciculation     Motor function  Strength:   5/5 RUE, 5/5 RLE  5/5 LUE, 5/5  LLE  Normal bulk and tone. Sensory function Intact to touch, pin, vibration, proprioception throughout     Cerebellar    Right upper extremity tremors, right partial, exacerbated with finger-nose testing       Reflex function 2/4 symmetric throughout . Downgoing plantar response bilaterally.  (-)Cisneros's sign bilaterally      Gait                   Not tested          MMSE: 30    Diagnostics:      Laboratory Testing:  CBC:   Recent Labs     03/10/21  0527 03/11/21  0719 03/11/21  1517 03/12/21  0538   WBC 1.2* 1.8* 1.8* 1.5*   HGB 15.1 12.3*  --  12.0*   PLT See Reflexed IPF Result See Reflexed IPF Result  --  106*     BMP:    Recent Labs     03/10/21  0703 03/11/21  0719 03/12/21  0538    134* 133*   K 4.9 3.7 3.7    102 102   CO2 23 22 21   BUN 12 12 9   CREATININE 1.06 1.03 1.05   GLUCOSE 122* 112* 84         Lab Results   Component Value Date    ALT 27 03/10/2021     (H) 03/10/2021    TSH 5.29 (H) 03/11/2021    INR 1.1 03/10/2021       No results found for: PHENYTOIN, PHENYTOIN, VALPROATE, CBMZ      Imaging/Diagnostics:        EEG: Pending            Plan:       57-year-old male without significant past medical history comes in with generalized weakness, fever of unknown origin, nausea vomiting.      -Thrombocytopenia  -Leukopenia  -Encephalopathy  -Sepsis  -Less likely malignancy         EEG   PT/OT   Rest of the treatment as per primary            Electronically signed by Caryle Font, MD on 3/12/2021 at 3:33 PM      Mesfin Jaramillo MD   PGY 2 Neurology Resident  3/12/2021 at 3:33 PM

## 2021-03-12 NOTE — PROGRESS NOTES
Physician Progress Note      PATIENT:               Dima Rivas  CSN #:                  [id number]  :                       1965  ADMIT DATE:       3/9/2021 7:34 PM  [address] [place] DATE:  RESPONDING  PROVIDER #:        Kasie Dong MD          QUERY TEXT:    Patient admitted with SIRS. Noted documentation of sepsis in H&P on 3./10. If   possible, please document in progress notes and discharge summary the source   of sepsis:    The medical record reflects the following:  Risk Factors: rhabdomyolysis, hyponatremia, hypocalcemia, hypokalemia ,  JACLYN  Clinical Indicators: SIRS- temp -101.2-98.9-1003, WBC- 1.2 - 1.8,  CRP-   30.1-17.0, CK- 2067, d- dimer- 12.69-6.71, ferritin, 2379 - 1804, myoglobin   538, sodium - 133-134. potassium - 3.6, calcium 7.5, creatinine - 1.35-1.05    BC - NGTD, Yana bar panel - elevated. Treatment: IV- Acyclovir, Azithromycin, NS 75    thank you, Jorge Okeefe, [facility]  [phone]  Options provided:  -- Sepsis, present on admission, due to, Please document source. -- NO Sepsis, only SIRS  -- Other - I will add my own diagnosis  -- Disagree - Not applicable / Not valid  -- Disagree - Clinically unable to determine / Unknown  -- Refer to Clinical Documentation Reviewer    PROVIDER RESPONSE TEXT:    This patient has sepsis which was present on admission due to Clinical   Indicators: SIRS- temp -101.2-98.9-1003, WBC- 1.2 - 1.8, CRP- 30.1-17.0, CK-   2067, d- dimer- 12.69-6.71, ferritin, 2379 - 1804, myoglobin 538, sodium -   133-134. potassium - 3.6, calcium 7.5, creatinine - 1.35-1.05 BC - NGTD,   Yana bar panel - elevated.     Query created by: Fransisca Boeck on 3/12/2021 2:01 PM      Electronically signed by:  Kasie Dong MD 3/12/2021 5:11 PM

## 2021-03-12 NOTE — PROGRESS NOTES
[facility] -   Infectious diseases evaluation  admission date 3/9/2021    reason for consultation:   sepsis    Impression :   Current:  · Leukopenia  · Lymphopenia  · Thrombocytopenia  · False + HIV - no HIV disease  · CRP elevation  · SIRS - sepsis  · Fatigue myalgias frontal headache fever irritability  · Early rhabdomyolysis  · Encephalopathy -MRI/ CSF neg  · Hypothyroidism  · Thyroid nodule  · Vitamin D deficiency      Other:  · QTC   Discussion / summary of stay / plan of care   ·   Recommendations   Concern for leukemia and chronic DIC  VS viral infection  Less likely severe sepsis, due to poor response of the CRP and procalcitonins compared to the level of neutropenia.  vanco ceftriaxone to be stopped, stop acyclovir  · zithromax- switch 3/12 to doxy for possible rickettsie  ·  and await BC  · Mycoplasma IGM neg  · CmV and EBV,  Neg for acute disease  · HIV Western blot neg -there is no HIV  · Parvovirus ab, IGG and IGM  · Consult neurology, await oncology opinion  · With this be all related to hypothyroidism? Doubt  · might still benefit from BM bx  · Watch plts drop    Infection Control Recommendations   · [place] Precautions  · Contact Isolation     Antimicrobial Stewardship Recommendations   · Simplification of therapy  · Targeted therapy  · IV to oral conversion  · Per Kg dosing    Coordination ofOutpatient Care:   · Estimated Length of IV antimicrobials:  · Patient will need Midline / picc Catheter Insertion:   · Patient will need SNF:  · Patient will need outpatient wound care:     History of Present Illness:   Initial history:  Jr Walker is a 64y.o.-year-old male presents w fatigue and SOB x  Days, unclear how long, migth be a week he says, nausea or vomiting and feverish.myalgias weakness, poor appetite. Found w fever 101 and CT neg for pneumonia or for PE - covid neg. Elevated infl markers and pancytopenia. Young cells in blood smear. Early rhabdo and elevated DDimer. Started on AB broad coverage. BC ordered  UA non ionfective    Has a LBP and very irritable on exam but appropriate - oriented - does not remember the time frame he was sick - has a frontal headache but no photophobia. Interval changes  3/12/2021   Patient Vitals for the past 8 hrs:   BP Temp Temp src Pulse Resp SpO2   03/12/21 1100 121/86 98.6 °F (37 °C) Axillary 89 13 (!) 85 %   03/12/21 0800 113/72 98.6 °F (37 °C) Axillary   99 %   no fever  Seems to very poorly concentrate again - poor memory  Withdrawn    infl markers still normal contrasting w neutropenia  Confirmatory HIV is neg  MRI LS Osteoarthritis - no infection  All cx neg  MRI and CT head neg  Echo plasma negative, CMV negative, EBV shows old active disease but no acuity  Hypothyroidism, low reticulocyte count    Summary of relevant labs:  Labs:  W 1.2 -1.9 - - 1.5  HCt 47 -38  plts 111 -106    CK 2067    Legionella Pneumophilia Ag, Urine NEGATIVE   [id number]   Procalcitonin0.16 - 0.11  CRP 30 - 17    Creat 1.35-1.06-1.03-1.05  DDimer 12-6.7    Micro:  covid rapid neg  UA not infective  HIV + screen  HIV-1 AB HIV-2 AB neg  CD4 /CD8 3.42    resp PCR neg  leguinella U AG neg  Mycoplasma IgM negative  CMV IgM negative  EBV shows active old disease  Meningitis encephalitis panel nega  cSF 6 WBCs  HSV neg    Imaging:  CT Head 3/11/21: Normal    MRI brain 3/11/21: Unremarkable    MRI LS No acute abnormality or abnormal enhancement in lumbar spine. Degenerative disc disease without spinal canal narrowing. Foraminal narrowing, minimal at left L4-5 and bilateral L5-S1     CT chest - no PE - personally reviewed and no ground glass or pneumonia  - hence not a picture of covid    I have personally reviewed the past medical history, past surgical history, medications, social history, and family history, and I haveupdated the database accordingly. Allergies:   Patient has no known allergies.      Review of Systems:     Review of Systems   Constitutional: Positive for activity change, fatigue and fever. HENT: Negative for congestion. Eyes: Negative for itching. Respiratory: Negative for apnea. Cardiovascular: Negative for chest pain. Gastrointestinal: Negative for abdominal distention and abdominal pain. Endocrine: Negative for heat intolerance. Genitourinary: Negative for dysuria and flank pain. Musculoskeletal: Positive for back pain. Negative for arthralgias. Skin: Negative for color change. Allergic/Immunologic: Negative for food allergies. Neurological: Positive for headaches. Negative for dizziness and seizures. Hematological: Negative for adenopathy. Bruises/bleeds easily. Psychiatric/Behavioral: Positive for agitation and decreased concentration. Physical Examination :       Physical Exam  Constitutional:       Appearance: Normal appearance. He is ill-appearing. HENT:      Head: Normocephalic and atraumatic. Nose: Nose normal.      Mouth/Throat:      Mouth: Mucous membranes are moist.   Eyes:      General: No scleral icterus. Conjunctiva/sclera: Conjunctivae normal.   Neck:      Musculoskeletal: Neck supple. No neck rigidity or muscular tenderness. Cardiovascular:      Rate and Rhythm: Normal rate and regular rhythm. Heart sounds: Normal heart sounds. No murmur. Pulmonary:      Effort: No respiratory distress. Breath sounds: Normal breath sounds. Abdominal:      General: Abdomen is flat. There is no distension. Palpations: Abdomen is soft. Tenderness: There is no abdominal tenderness. Genitourinary:     Comments: No diaz  Musculoskeletal:         General: No swelling or deformity. Right lower leg: No edema. Left lower leg: No edema. Skin:     General: Skin is dry. Coloration: Skin is not jaundiced or pale. Findings: No erythema. Neurological:      General: No focal deficit present.       Mental Status: He is alert.      Cranial Nerves: No cranial nerve deficit. Psychiatric:         Mood and Affect: Mood normal.         Thought Content: Thought content normal.      Comments: Very irritable but making sense - poor historian for the details of the illness but appropriate otherwise         Past Medical History:     Past Medical History:   Diagnosis Date    GERD (gastroesophageal reflux disease)     Patient denies medical problems        Past Surgical  History:   History reviewed. No pertinent surgical history.     Medications:      sodium chloride flush  10 mL Intravenous 2 times per day    Ergocalciferol  2,000 Units Oral Daily    levothyroxine  50 mcg Oral Daily    sodium chloride flush  10 mL Intravenous 2 times per day    azithromycin  500 mg Intravenous Q24H    multivitamin  1 tablet Oral Daily    thiamine  100 mg Oral Daily    pantoprazole  40 mg Oral QAM AC    influenza virus vaccine  0.5 mL Intramuscular Prior to discharge    sodium chloride flush  10 mL Intravenous Once       Social History:     Social History     Socioeconomic History    Marital status: Single     Spouse name: Not on file    Number of children: Not on file    Years of education: Not on file    Highest education level: Not on file   Occupational History    Not on file   Social Needs    Financial resource strain: Not on file    Food insecurity     Worry: Not on file     Inability: Not on file   [employer] needs     Medical: Not on file     Non-medical: Not on file   Tobacco Use    Smoking status: Former Smoker     Packs/day: 1.00     Types: Cigarettes    Smokeless tobacco: Never Used   Substance and Sexual Activity    Alcohol use: Yes     Comment: pt states he \"drinks beer once a week\"    Drug use: Not Currently     Types: Cocaine    Sexual activity: Not Currently   Lifestyle    Physical activity     Days per week: Not on file     Minutes per session: Not on file    Stress: Not on file   Relationships    Social connections     Talks on phone: Not on file     Gets together: Not on file     Attends Sabianist service: Not on file     Active member of club or organization: Not on file     Attends meetings of clubs or organizations: Not on file     Relationship status: Not on file    Intimate partner violence     Fear of current or ex partner: Not on file     Emotionally abused: Not on file     Physically abused: Not on file     Forced sexual activity: Not on file   Other Topics Concern    Not on file   Social History Narrative    Not on file       Family History:     Family History   Problem Relation Age of Onset    Hypertension Mother     Hypertension Father       Medical Decision Making:   I have independently reviewed/ordered the following labs:    CBC with Differential:   Recent Labs     03/11/21 0719 03/11/21  1517 03/12/21  0538   WBC 1.8* 1.8* 1.5*   HGB 12.3*  --  12.0*   HCT 38.3*  --  38.0*   PLT See Reflexed IPF Result  --  106*   LYMPHOPCT 57* 44 57*   MONOPCT 8*  --  6     BMP:  Recent Labs     03/09/21  2004 03/09/21  2004 03/10/21  0703 03/11/21  0719 03/12/21  0538   *   < > 136 134* 133*   K 3.6*   < > 4.9 3.7 3.7   CL 96*   < > 102 102 102   CO2 23   < > 23 22 21   BUN 12   < > 12 12 9   CREATININE 1.35*   < > 1.06 1.03 1.05   MG 1.9  --  2.0  --   --     < > = values in this interval not displayed. Hepatic Function Panel:   Recent Labs     03/10/21  0527 03/10/21  0703   PROT DISREGARD RESULTS. SPECIMEN WILL BE REDRAWN. 6.3*   LABALBU DISREGARD RESULTS. SPECIMEN WILL BE REDRAWN. 2.7*   BILITOT DISREGARD RESULTS. SPECIMEN WILL BE REDRAWN. 0.25*   ALKPHOS DISREGARD RESULTS. SPECIMEN WILL BE REDRAWN. 45   ALT DISREGARD RESULTS. SPECIMEN WILL BE REDRAWN. 27   AST DISREGARD RESULTS. SPECIMEN WILL BE REDRAWN. 106*     No results for input(s): RPR in the last 72 hours. No results for input(s): HIV in the last 72 hours. No results for input(s): BC in the last 72 hours.   Lab Results Component Value Date    CREATININE 1.05 03/12/2021    GLUCOSE 84 03/12/2021       Detailed results: Thank you for allowing us to participate in the care of this patient. Please call with questions. This note is created with the assistance of a speech recognition program.  While intending to generate adocument that actually reflects the content of the visit, the document can still have some errors including those of syntax and sound a like substitutions which may escape proof reading. It such instances, actual meaningcan be extrapolated by contextual diversion. Ilia Hicks  Office: [phone]  Perfect serve / office [phone]      I have discussed the care of the patient, including pertinent history and exam findings,  with the student. I have seen and examined the patient and the key elements of all parts of the encounter have been performed by me.   I agree with the assessment, plan and orders as documented by the student    Natalie Plaza, Infectious Diseases

## 2021-03-12 NOTE — PROGRESS NOTES
Occupational Therapy  Facility/Department: [facility] 3  Daily Treatment Note  NAME: Karen Jacome  : 1965  MRN: [id number]    Date of Service: 3/12/2021    Discharge Recommendations:    No therapy recommended at discharge. Assessment   Performance deficits / Impairments: Decreased high-level IADLs;Decreased functional mobility ; Decreased endurance  Prognosis: Good  Patient Education: importance of OT participation and OOB activity - poor return  REQUIRES OT FOLLOW UP: Yes  Activity Tolerance  Activity Tolerance: Treatment limited secondary to agitation(did not want to further participate in OT treatment, repeatedly declining all ADLs)         Patient Diagnosis(es): The primary encounter diagnosis was Hypoxia. Diagnoses of Fatigue, unspecified type, Generalized weakness, and Non-traumatic rhabdomyolysis were also pertinent to this visit. has a past medical history of GERD (gastroesophageal reflux disease) and Patient denies medical problems. has no past surgical history on file. Restrictions  Restrictions/Precautions  Restrictions/Precautions: Up as Tolerated  Required Braces or Orthoses?: No  Position Activity Restriction  Other position/activity restrictions: MRSA, up with assist  Subjective   General  Patient assessed for rehabilitation services?: Yes  Family / Caregiver Present: No  General Comment  Comments: RN ok'd for OT treatment. pt flat affect throughout, encouragement needed to participate. stated not wanting to talk or answer questions  Vital Signs  Patient Currently in Pain: Denies   Orientation  Orientation  Overall Orientation Status: Within Normal Limits  Objective    ADL  LE Dressing: Independent(to don socks seated EOB)  Additional Comments: pt repeatedly declining bathing, brushing teeth, toileting, grooming tasks, and dressing tasks.  states completing all these ADLs this date, prior to OT arrival. max ecnouragement given for ADL participation, pt becoming mildly agitated - stating not in the mood to talk. Balance  Sitting Balance: Independent(~5 min EOB, unsupported)  Standing Balance: Supervision  Standing Balance  Time: ~1 min  Activity: in prep for/following functional mobility  Functional Mobility  Functional - Mobility Device: No device  Activity: To/from bathroom; Other(household distances within hospital room)  Assist Level: Supervision  Functional Mobility Comments: did not demo SOB with functional mobility, no LOB  Bed mobility  Supine to Sit: Modified independent  Sit to Supine: Modified independent  Transfers  Sit to stand: Supervision(from EOB)  Stand to sit: Supervision(onto EOB)  Transfer Comments: no use of RW         Cognition  Overall Cognitive Status: WNL      Plan   Plan  Times per week: 2-3 visits    Goals  Short term goals  Time Frame for Short term goals: pt will, by discharge  Short term goal 1: dem LB ADL/toileting ind with AE PRN  Short term goal 2: complete functional mobility/transfer independently with LRD PRN  Short term goal 3: dem dynamic standing for 15+ minutes during functional activity independently with LRD PRN       Therapy Time   Individual Concurrent Group Co-treatment   Time In 1049         Time Out 1100         Minutes 11         Timed Code Treatment Minutes: All [place] 307, OTR/L

## 2021-03-13 LAB
ABSOLUTE EOS #: 0 K/UL (ref 0–0.4)
ABSOLUTE IMMATURE GRANULOCYTE: 0 K/UL (ref 0–0.3)
ABSOLUTE LYMPH #: 1.28 K/UL (ref 1–4.8)
ABSOLUTE MONO #: 0.46 K/UL (ref 0.1–0.8)
ANION GAP SERPL CALCULATED.3IONS-SCNC: 11 MMOL/L (ref 9–17)
B BURGDORFERI AB,CSF: 0.18 LIV
BASOPHILS # BLD: 0 % (ref 0–2)
BASOPHILS ABSOLUTE: 0 K/UL (ref 0–0.2)
BUN BLDV-MCNC: 7 MG/DL (ref 6–20)
BUN/CREAT BLD: ABNORMAL (ref 9–20)
CALCIUM SERPL-MCNC: 7.4 MG/DL (ref 8.6–10.4)
CHLORIDE BLD-SCNC: 106 MMOL/L (ref 98–107)
CO2: 22 MMOL/L (ref 20–31)
CREAT SERPL-MCNC: 0.92 MG/DL (ref 0.7–1.2)
DIFFERENTIAL TYPE: ABNORMAL
EOSINOPHILS RELATIVE PERCENT: 0 % (ref 1–4)
FOLATE: 14.7 NG/ML
G-6-PD, QUANT: 9.9 U/G HB (ref 9.9–16.6)
GFR AFRICAN AMERICAN: >60 ML/MIN
GFR NON-AFRICAN AMERICAN: >60 ML/MIN
GFR SERPL CREATININE-BSD FRML MDRD: ABNORMAL ML/MIN/{1.73_M2}
GFR SERPL CREATININE-BSD FRML MDRD: ABNORMAL ML/MIN/{1.73_M2}
GLUCOSE BLD-MCNC: 89 MG/DL (ref 70–99)
HCT VFR BLD CALC: 37.8 % (ref 40.7–50.3)
HEMOGLOBIN: 12.2 G/DL (ref 13–17)
IMMATURE GRANULOCYTES: 0 %
LYMPHOCYTES # BLD: 61 % (ref 24–44)
MCH RBC QN AUTO: 28.4 PG (ref 25.2–33.5)
MCHC RBC AUTO-ENTMCNC: 32.3 G/DL (ref 28.4–34.8)
MCV RBC AUTO: 88.1 FL (ref 82.6–102.9)
MONOCYTES # BLD: 22 % (ref 1–7)
MORPHOLOGY: NORMAL
NRBC AUTOMATED: 0 PER 100 WBC
PDW BLD-RTO: 14.1 % (ref 11.8–14.4)
PLATELET # BLD: ABNORMAL K/UL (ref 138–453)
PLATELET ESTIMATE: ABNORMAL
PLATELET, FLUORESCENCE: 103 K/UL (ref 138–453)
PLATELET, IMMATURE FRACTION: 8 % (ref 1.1–10.3)
PMV BLD AUTO: ABNORMAL FL (ref 8.1–13.5)
POTASSIUM SERPL-SCNC: 3.8 MMOL/L (ref 3.7–5.3)
RBC # BLD: 4.29 M/UL (ref 4.21–5.77)
RBC # BLD: ABNORMAL 10*6/UL
SEG NEUTROPHILS: 17 % (ref 36–66)
SEGMENTED NEUTROPHILS ABSOLUTE COUNT: 0.36 K/UL (ref 1.8–7.7)
SODIUM BLD-SCNC: 139 MMOL/L (ref 135–144)
VITAMIN B-12: 646 PG/ML (ref 232–1245)
WBC # BLD: 2.1 K/UL (ref 3.5–11.3)
WBC # BLD: ABNORMAL 10*3/UL

## 2021-03-13 PROCEDURE — 85025 COMPLETE CBC W/AUTO DIFF WBC: CPT

## 2021-03-13 PROCEDURE — 99232 SBSQ HOSP IP/OBS MODERATE 35: CPT | Performed by: INTERNAL MEDICINE

## 2021-03-13 PROCEDURE — 6370000000 HC RX 637 (ALT 250 FOR IP): Performed by: INTERNAL MEDICINE

## 2021-03-13 PROCEDURE — 2580000003 HC RX 258: Performed by: NURSE PRACTITIONER

## 2021-03-13 PROCEDURE — 99232 SBSQ HOSP IP/OBS MODERATE 35: CPT | Performed by: PSYCHIATRY & NEUROLOGY

## 2021-03-13 PROCEDURE — 85055 RETICULATED PLATELET ASSAY: CPT

## 2021-03-13 PROCEDURE — 36415 COLL VENOUS BLD VENIPUNCTURE: CPT

## 2021-03-13 PROCEDURE — 95816 EEG AWAKE AND DROWSY: CPT | Performed by: PSYCHIATRY & NEUROLOGY

## 2021-03-13 PROCEDURE — 99233 SBSQ HOSP IP/OBS HIGH 50: CPT | Performed by: INTERNAL MEDICINE

## 2021-03-13 PROCEDURE — 2580000003 HC RX 258: Performed by: INTERNAL MEDICINE

## 2021-03-13 PROCEDURE — 80048 BASIC METABOLIC PNL TOTAL CA: CPT

## 2021-03-13 PROCEDURE — 2060000000 HC ICU INTERMEDIATE R&B

## 2021-03-13 RX ADMIN — PANTOPRAZOLE SODIUM 40 MG: 40 TABLET, DELAYED RELEASE ORAL at 08:08

## 2021-03-13 RX ADMIN — DOXYCYCLINE HYCLATE 100 MG: 100 TABLET, COATED ORAL at 20:34

## 2021-03-13 RX ADMIN — SODIUM CHLORIDE, PRESERVATIVE FREE 10 ML: 5 INJECTION INTRAVENOUS at 20:40

## 2021-03-13 RX ADMIN — LEVOTHYROXINE SODIUM 50 MCG: 50 TABLET ORAL at 08:08

## 2021-03-13 RX ADMIN — ALCOHOL 1 TABLET: 70.47 GEL TOPICAL at 08:08

## 2021-03-13 RX ADMIN — SODIUM CHLORIDE: 9 INJECTION, SOLUTION INTRAVENOUS at 13:45

## 2021-03-13 RX ADMIN — Medication 100 MG: at 08:08

## 2021-03-13 RX ADMIN — DOXYCYCLINE HYCLATE 100 MG: 100 TABLET, COATED ORAL at 08:08

## 2021-03-13 RX ADMIN — SODIUM CHLORIDE, PRESERVATIVE FREE 10 ML: 5 INJECTION INTRAVENOUS at 08:11

## 2021-03-13 RX ADMIN — ERGOCALCIFEROL SOLN 200 MCG/ML (8000 UNIT/ML) 2000 UNITS: 8000 SOLUTION at 08:09

## 2021-03-13 ASSESSMENT — ENCOUNTER SYMPTOMS
COLOR CHANGE: 0
ABDOMINAL PAIN: 0
APNEA: 0
BACK PAIN: 1
VOMITING: 0
ABDOMINAL DISTENTION: 0
NAUSEA: 0
EYE ITCHING: 0

## 2021-03-13 ASSESSMENT — PAIN DESCRIPTION - PROGRESSION

## 2021-03-13 ASSESSMENT — PAIN SCALES - GENERAL: PAINLEVEL_OUTOF10: 0

## 2021-03-13 NOTE — PROGRESS NOTES
[facility] -   Infectious diseases evaluation  admission date 3/9/2021    reason for consultation:   sepsis    Impression :   Current:  · Leukopenia  · Lymphopenia  · Thrombocytopenia  · False + HIV - no HIV disease  · CRP elevation  · SIRS - sepsis  · Fatigue myalgias frontal headache fever irritability  · Early rhabdomyolysis  · Encephalopathy -MRI/ CSF neg  · Hypothyroidism  · Thyroid nodule  · Vitamin D deficiency      Other:  · QTC   Discussion / summary of stay / plan of care   ·   Recommendations   Concern for leukemia and chronic DIC  VS viral infection  Less likely severe sepsis, due to poor response of the CRP and procalcitonins compared to the level of neutropenia.  vanco ceftriaxone to be stopped, stop acyclovir  · zithromax- switch 3/12 to doxy for possible rickettsie  ·  and await BC, so far negative  · Parvovirus ab, IGG and IGM  · BALBIR profile pending  · Appreciate neurology and oncology opinions  · Might benefit from bone marrow biopsy  · With this be all related to hypothyroidism? Doubt      Infection Control Recommendations   · [place] Precautions  · Contact Isolation     Antimicrobial Stewardship Recommendations   · Simplification of therapy  · Targeted therapy  · IV to oral conversion  · Per Kg dosing    Coordination ofOutpatient Care:   · Estimated Length of IV antimicrobials:  · Patient will need Midline / picc Catheter Insertion:   · Patient will need SNF:  · Patient will need outpatient wound care:     History of Present Illness:   Initial history:  Rajat Angeles is a 64y.o.-year-old male presents w fatigue and SOB x  Days, unclear how long, migth be a week he says, nausea or vomiting and feverish.myalgias weakness, poor appetite. Found w fever 101 and CT neg for pneumonia or for PE - covid neg. Elevated infl markers and pancytopenia. Young cells in blood smear. Early rhabdo and elevated DDimer. Started on AB broad coverage.  BC ordered  UA non ionfective    Has a LBP and very irritable on exam but appropriate - oriented - does not remember the time frame he was sick - has a frontal headache but no photophobia. Interval changes  3/13/2021   Patient Vitals for the past 8 hrs:   BP Temp Temp src Pulse Resp SpO2   03/13/21 0813 118/71   82 24    03/13/21 0812 118/71 98.6 °F (37 °C) Oral      03/13/21 0423 118/78 99.7 °F (37.6 °C) Oral 84 16 99 %   no fever, vitals stable  Seems to very poorly concentrate again - poor memory  Remains withdrawn    infl markers still normal contrasting w neutropenia  Confirmatory HIV is neg  MRI LS Osteoarthritis - no infection  All cx neg  Platelets remain 057 range  LP neg-   CSF Lyme negative  Repeat CRP remains low  MRI and CT head neg  Echo plasma negative, CMV negative, EBV shows old active disease but no acuity  Hypothyroidism, low reticulocyte count  EEG showing theta slowing suggesting encephalopathy    B12, folate normal  BALBIR pending    Summary of relevant labs:  Labs:  W 1.2 - 1.8 - 1.8 - 1.5 - 2.1  HCt 47 -38 - 37.8  plts 111 -106 - 103    CK 2067    Legionella Pneumophilia Ag, Urine NEGATIVE   [id number]   Procalcitonin0.16 - 0.11  CRP 30 - 17 - 7.5    Creat 1.35-1.06-1.03-1.05-0.92  DDimer 12-6.7    Micro:  covid rapid neg  UA not infective  HIV + screen  HIV-1 AB HIV-2 AB neg  CD4 /CD8 3.42    resp PCR neg  leguinella U AG neg  Mycoplasma IgM negative  CMV IgM negative  EBV shows active old disease  Meningitis encephalitis panel nega  cSF 6 WBCs  HSV neg    Imaging:  CT Head 3/11/21: Normal    MRI brain 3/11/21: Unremarkable    MRI LS No acute abnormality or abnormal enhancement in lumbar spine. Degenerative disc disease without spinal canal narrowing.    Foraminal narrowing, minimal at left L4-5 and bilateral L5-S1     CT chest - no PE - personally reviewed and no ground glass or pneumonia  - hence not a picture of covid    I have personally reviewed the past medical history, past surgical history, medications, social history, and family history, and I haveupdated the database accordingly. Allergies:   Patient has no known allergies. Review of Systems:     Review of Systems   Constitutional: Positive for activity change, fatigue and fever. HENT: Negative for congestion. Eyes: Negative for itching. Respiratory: Negative for apnea. Cardiovascular: Negative for chest pain. Gastrointestinal: Negative for abdominal distention, abdominal pain, nausea and vomiting. Endocrine: Negative for heat intolerance. Genitourinary: Negative for dysuria and flank pain. Musculoskeletal: Positive for back pain. Negative for arthralgias. Skin: Negative for color change and rash. Allergic/Immunologic: Negative for food allergies. Neurological: Positive for headaches. Negative for dizziness and seizures. Hematological: Negative for adenopathy. Bruises/bleeds easily. Psychiatric/Behavioral: Positive for agitation and decreased concentration. Physical Examination :       Physical Exam  Constitutional:       Appearance: Normal appearance. He is ill-appearing. HENT:      Head: Normocephalic and atraumatic. Nose: Nose normal.      Mouth/Throat:      Mouth: Mucous membranes are moist.   Eyes:      General: No scleral icterus. Conjunctiva/sclera: Conjunctivae normal.   Neck:      Musculoskeletal: Neck supple. No neck rigidity or muscular tenderness. Cardiovascular:      Rate and Rhythm: Normal rate and regular rhythm. Heart sounds: Normal heart sounds. No murmur. Pulmonary:      Effort: No respiratory distress. Breath sounds: Normal breath sounds. Abdominal:      General: Abdomen is flat. There is no distension. Palpations: Abdomen is soft. Tenderness: There is no abdominal tenderness. Genitourinary:     Comments: No diaz  Musculoskeletal:         General: No swelling or deformity. Right lower leg: No edema.       Left lower leg: No edema. Skin:     General: Skin is dry. Coloration: Skin is not jaundiced or pale. Findings: No erythema. Neurological:      General: No focal deficit present. Mental Status: He is alert. Cranial Nerves: No cranial nerve deficit. Psychiatric:         Mood and Affect: Mood normal.      Comments: Very irritable but making sense - poor historian for the details of the illness but appropriate otherwise         Past Medical History:     Past Medical History:   Diagnosis Date    GERD (gastroesophageal reflux disease)     Patient denies medical problems        Past Surgical  History:   History reviewed. No pertinent surgical history.     Medications:      sodium chloride flush  10 mL Intravenous 2 times per day    doxycycline hyclate  100 mg Oral 2 times per day    Ergocalciferol  2,000 Units Oral Daily    levothyroxine  50 mcg Oral Daily    sodium chloride flush  10 mL Intravenous 2 times per day    multivitamin  1 tablet Oral Daily    thiamine  100 mg Oral Daily    pantoprazole  40 mg Oral QAM AC    influenza virus vaccine  0.5 mL Intramuscular Prior to discharge    sodium chloride flush  10 mL Intravenous Once       Social History:     Social History     Socioeconomic History    Marital status: Single     Spouse name: Not on file    Number of children: Not on file    Years of education: Not on file    Highest education level: Not on file   Occupational History    Not on file   Social Needs    Financial resource strain: Not on file    Food insecurity     Worry: Not on file     Inability: Not on file   [employer] needs     Medical: Not on file     Non-medical: Not on file   Tobacco Use    Smoking status: Former Smoker     Packs/day: 1.00     Types: Cigarettes    Smokeless tobacco: Never Used   Substance and Sexual Activity    Alcohol use: Yes     Comment: pt states he \"drinks beer once a week\"    Drug use: Not Currently     Types: Cocaine    Sexual activity: Not Currently   Lifestyle    Physical activity     Days per week: Not on file     Minutes per session: Not on file    Stress: Not on file   Relationships    Social connections     Talks on phone: Not on file     Gets together: Not on file     Attends Rastafarian service: Not on file     Active member of club or organization: Not on file     Attends meetings of clubs or organizations: Not on file     Relationship status: Not on file    Intimate partner violence     Fear of current or ex partner: Not on file     Emotionally abused: Not on file     Physically abused: Not on file     Forced sexual activity: Not on file   Other Topics Concern    Not on file   Social History Narrative    Not on file       Family History:     Family History   Problem Relation Age of Onset    Hypertension Mother     Hypertension Father       Medical Decision Making:   I have independently reviewed/ordered the following labs:    CBC with Differential:   Recent Labs     03/12/21  0538 03/13/21 0621   WBC 1.5* 2.1*   HGB 12.0* 12.2*   HCT 38.0* 37.8*   * See Reflexed IPF Result   LYMPHOPCT 57* 61*   MONOPCT 6 22*     BMP:  Recent Labs     03/12/21  0538 03/13/21 0621   * 139   K 3.7 3.8    106   CO2 21 22   BUN 9 7   CREATININE 1.05 0.92     Hepatic Function Panel:   No results for input(s): PROT, LABALBU, BILIDIR, IBILI, BILITOT, ALKPHOS, ALT, AST in the last 72 hours. No results for input(s): RPR in the last 72 hours. No results for input(s): HIV in the last 72 hours. No results for input(s): BC in the last 72 hours. Lab Results   Component Value Date    CREATININE 0.92 03/13/2021    GLUCOSE 89 03/13/2021       Detailed results: Thank you for allowing us to participate in the care of this patient. Please call with questions.     This note is created with the assistance of a speech recognition program.  While intending to generate adocument that actually reflects the content of the visit, the document can still have some errors including those of syntax and sound a like substitutions which may escape proof reading. It such instances, actual meaningcan be extrapolated by contextual diversion. Juan J Johnson  Office: [phone]  Perfect serve / office [phone]      I have discussed the care of the patient, including pertinent history and exam findings,  with the student. I have seen and examined the patient and the key elements of all parts of the encounter have been performed by me.   I agree with the assessment, plan and orders as documented by the student    Nataliejean-paul Plaza, Infectious Diseases

## 2021-03-13 NOTE — PROGRESS NOTES
Progress Notes                  Today's Date: 3/13/2021  Patient Name: Dread Pindeo  Date of admission: 3/9/2021  7:34 PM  Patient's age: 64 y. o., 1965  [facility] [A41.9]          CHIEF COMPLAINT:  Fatigue    History Obtained From:  patient, electronic medical record    Interval History:  Patient seen and examined  Resting comfortably, denies pain, reports appetite is improving somewhat  Remains fatigued  Platelets and hgb around stable  Leukocytoes improving today  HIV abs negative    HISTORY OF PRESENT ILLNESS:      Patient 70-year-old male admitted for sepsis. Patient reports for past 2 days he had noticed increasing SOB, fatigue, nausea, vomiting. Additionally complaining of generalized myalgias, low back pain. Denies productive sputum, sick contacts, changes in bowel or bladder function. In the ED, patient found to be febrile at 101.2, HR 94, RR highest 21. Initial lab work shows significant neutropenia WBC at 1.2, mild thrombocytopenia with platelets at 904. Ferritin and procalcitonin elevated. Chest x-ray and CT chest shows no acute abnormalities. Given concern for sepsis, sepsis protocol initiated patient admitted for management. Evaluated by infectious disease, noted increased percentage of immature white blood cells which prompted oncology consult for evaluation of possible leukemia. Past Medical History:   has a past medical history of GERD (gastroesophageal reflux disease) and Patient denies medical problems. Past Surgical History:   has no past surgical history on file.      Home Medications:    Prior to Admission medications    Not on File     Current Facility-Administered Medications   Medication Dose Route Frequency Provider Last Rate Last Admin    sodium chloride flush 0.9 % injection 10 mL  10 mL Intravenous 2 times per day Claudette Bishop Delgrosso, APRN - CNP   10 mL at 03/13/21 0811    sodium chloride flush 0.9 % injection 10 mL  10 mL Intravenous PRN Nakita العراقي APRN - CNP        doxycycline hyclate (VIBRA-TABS) tablet 100 mg  100 mg Oral 2 times per day Delfina Luong MD   100 mg at 03/13/21 1230    Ergocalciferol (Drisdol) 200 MCG/ML drops 2,000 Units  2,000 Units Oral Daily Giuliana Santana MD   2,000 Units at 03/13/21 0809    levothyroxine (SYNTHROID) tablet 50 mcg  50 mcg Oral Daily Giuliana Santana MD   50 mcg at 03/13/21 6909    sodium chloride flush 0.9 % injection 10 mL  10 mL Intravenous PRN Delfina Luong MD        0.9 % sodium chloride infusion   Intravenous Continuous Giuliana Santana MD 75 mL/hr at 03/13/21 1345 New Bag at 03/13/21 1345    sodium chloride flush 0.9 % injection 10 mL  10 mL Intravenous 2 times per day Gene Parra, KARY - CNP   Stopped at 03/10/21 0905    sodium chloride flush 0.9 % injection 10 mL  10 mL Intravenous PRN Mindaderella Hives APRN - CNP        HYDROcodone-acetaminophen [facility]) 5-325 MG per tablet 1 tablet  1 tablet Oral Q6H PRN Armas Booty, DO   1 tablet at 03/11/21 0355    multivitamin 1 tablet  1 tablet Oral Daily Armas Booty, DO   1 tablet at 03/13/21 4881    thiamine tablet 100 mg  100 mg Oral Daily Armas Booty, DO   100 mg at 03/13/21 6415    LORazepam (ATIVAN) injection 1 mg  1 mg Intravenous Q4H PRN Armas Booty, DO        pantoprazole (PROTONIX) tablet 40 mg  40 mg Oral QAM AC Armas Booty, DO   40 mg at 03/13/21 5091    potassium chloride (KLOR-CON M) extended release tablet 40 mEq  40 mEq Oral PRN Armas Booty, DO        Or    potassium bicarb-citric acid (EFFER-K) effervescent tablet 40 mEq  40 mEq Oral PRN Armas Booty, DO        Or    potassium chloride 10 mEq/100 mL IVPB (Peripheral Line)  10 mEq Intravenous PRN Armas Booty, DO        influenza quadrivalent split vaccine (FLUZONE;FLUARIX;FLULAVAL;AFLURIA) injection 0.5 mL  0.5 mL Intramuscular Prior to discharge Armas Booty, DO        sodium chloride flush 0.9 % injection 10 mL  10 mL Intravenous Once Rogerio Chan MD           Allergies:  Patient has no known allergies. Social History:   reports that he has quit smoking. His smoking use included cigarettes. He smoked 1.00 pack per day. He has never used smokeless tobacco. He reports current alcohol use. He reports previous drug use. Drug: Cocaine. Family History: family history includes Hypertension in his father and mother. REVIEW OF SYSTEMS:      Constitutional: No fever or chills. No night sweats, no weight loss   Eyes: No eye discharge, double vision, or eye pain   HEENT: negative for sore mouth, sore throat, hoarseness and voice change   Respiratory: negative for cough , sputum, dyspnea, wheezing, hemoptysis, chest pain   Cardiovascular: negative for chest pain, dyspnea, palpitations, orthopnea, PND   Gastrointestinal: negative for nausea, vomiting, diarrhea, constipation, or  abdominal pain   Genitourinary: negative for frequency, dysuria, nocturia, urinary incontinence, and hematuria   Hematologic/Lymphatic: negative for easy bruising, bleeding, petechiae or lymphadenopathy  Endocrine: negative for heat or cold intolerance,   Musculoskeletal: negative for myalgias, arthralgias, pain, joint swelling,and bone pain   Neurological: negative for headaches, dizziness, seizures, weakness, or numbness  Integument: negative for rash, skin lesions, bruises.     PHYSICAL EXAM:        Vitals:  /79   Pulse 76   Temp 98.6 °F (37 °C) (Oral)   Resp 24   Ht 6' 5\" (1.956 m)   Wt 224 lb 13.9 oz (102 kg)   SpO2 99%   BMI 26.67 kg/m²     General appearance - well appearing, not in pain or distress, not jaundiced   Mental status - alert and cooperative   Eyes - pupils equal and reactive, extraocular eye movements intact   Ears - bilateral TM's and external ear canals normal   Mouth - mucous membranes moist, pharynx normal without lesions,   Neck - supple, no palpable  adenopathy , trach midline Diagnosis Date Noted    Encephalopathy [G93.40]     Hypothyroidism [E03.9]     History of ETOH abuse [F10.11]     History of substance abuse ([facility] [F19.11]     Leukopenia [D72.819] 03/10/2021    [facility] [D69.6] 03/10/2021    Vitamin D deficiency [E55.9] 03/10/2021    JACLYN (acute kidney injury) ([facility] [N17.9] 03/10/2021    Hyponatremia [E87.1] 03/10/2021    Hypocalcemia [E83.51] 03/10/2021    Hypokalemia [E87.6] 03/10/2021    Thyroid nodule, left side [E04.1] 03/10/2021    Hypoalbuminemia [E88.09] 03/10/2021    Hypoxia [R09.02]     Fatigue [R53.83]     Sepsis ([facility] [A41.9] 03/09/2021    Fatty liver [K76.0] 04/13/2020    ETOH abuse [F10.10] 04/12/2020    Acute dehydration [E86.0] 04/12/2020     Thrombocytopenia: Given patient's alcohol abuse and characteristic patterns on LFTs, likely from combination of alcohol use and sepsis, immature cells not too concerning  Sepsis: Possible viral infection, unknown source at this time, ID following with infectious panel  Thyroid nodule: Large nodule on left side measuring 1.7 cm seen on CT, previous CT last year showed low-density 2 cm nodule, no TSH has been done  JACLYN  Ethanol abuse  Fatty liver    RECOMMENDATIONS:  1. Given that it is unlikely leukemia, will not require acute interventions at this time  2. Antibiotic therapy as per ID  3. Given persistent thyroid nodule over 1 cm in size with minimal TSH changes FNA will be needed to rule out malignant pathology. TSH done only showed minimal elevation of 5.29, slight decrease in T4. Not enough to result in BM suppression. 4. Continue to monitor CBC  5. HIV 1/2 Ab confirmatory test Negative, HIV western blotting and viral load counts ordered by ID. 6. We will continue to follow     Alma Haywood, PGY 3      Attending Physician Statement   I have discussed the care of this patient, including pertinent history and exam findings, with the resident.  I have seen and examined the patient and the key elements of all parts of the encounter have been performed by me. I agree with the assessment, plan and orders as documented by the resident and with changes made to the note.     If counts do not improve in the next few days will consider bone marrow biopsy    Jozef Campbell MD  Hematology/Oncology    Cell: [phone]

## 2021-03-13 NOTE — PROGRESS NOTES
[id number] [facility] Neurology   IN-PATIENT SERVICE      NEUROLOGY PROGRESS  NOTE            Date:   3/13/2021  Patient name:  Garrison Frazier  Date of admission:  3/9/2021  YOB: 1965      Interval History:   Garrison Frazier is a  64 y.o. male admitted on 3/9/2021 with Sepsis ([facility] [address] [A41.9]. This is a follow-up neurology progress note. The patient was seen and examined and the chart was reviewed. Was complaining of lightheadedness when he woke up from the bed suddenly. Denies any room spinning sensation, denies any passing out sensation. Patient seemed to be having flat affect, not cooperating examination and questioning her, getting irritated and frustrated on asking question all for examination. Denies any chest pain, headache, vision changes, tingling, numbness. Right upper extremity portable tremor present. Awaiting  EEG read. Labs:   Hb: 12.2  Calcium 7.4  TSH: 5.29, thyroxine, 0.70  WBC 2.1   B12: 646 and folate  14.7        WBC 21. History of Present Illness: The patient is a 64 y.o. male who presents with Fatigue (x 2 days)   and he is admitted to the hospital for the management of weakness of the is lightheaded fatigue,, fever of unknown origin. The patient was seen and examined and the chart was reviewed. 54-year-old male without significant past medical history comes into the ER for fatigue and shortness of breath for past couple of days. Associated with nausea and vomiting, myalgia, weakness poor appetite. Arrival to the ER patient found to be febrile with temp of 101.2, heart rate 94, respiratory 21. Abnormal lab: WBC 1.2, platelets 807  , ALT 27,  Elevated pro-Andrew and ferritin. Chest x-ray, CT chest no acute abnormalities. Thyroid nodule of 1.7 cm seen on previous CT     Examination patient noted to have right upper extremity tremor, exacerbated by stretching. Patient stated that he has some balance issue while walking. Jessica Morgan has been going on for 1 year. Patient has a flat affect  Glucose 62, protein CSF 61.8  WBC 6, RBC 3000, neutrophils 32       Past Medical History:     Past Medical History:   Diagnosis Date    GERD (gastroesophageal reflux disease)     Patient denies medical problems         Past Surgical History:     History reviewed. No pertinent surgical history. Medications during admission:      sodium chloride flush  10 mL Intravenous 2 times per day    doxycycline hyclate  100 mg Oral 2 times per day    Ergocalciferol  2,000 Units Oral Daily    levothyroxine  50 mcg Oral Daily    sodium chloride flush  10 mL Intravenous 2 times per day    multivitamin  1 tablet Oral Daily    thiamine  100 mg Oral Daily    pantoprazole  40 mg Oral QAM AC    influenza virus vaccine  0.5 mL Intramuscular Prior to discharge    sodium chloride flush  10 mL Intravenous Once         Physical Exam:   /78   Pulse 84   Temp 99.7 °F (37.6 °C) (Oral)   Resp 16   Ht 6' 5\" (1.956 m)   Wt 224 lb 13.9 oz (102 kg)   SpO2 99%   BMI 26.67 kg/m²   Temp (24hrs), Av.2 °F (37.3 °C), Min:98.6 °F (37 °C), Max:100 °F (37.8 °C)        General examination:      General Appearance:  alert, well appearing, and in no acute distress  HEENT: Normocephalic, atraumatic, moist mucus membranes  Neck: supple, no carotid bruits, (-) nuchal rigidity  Lungs:  Respirations unlabored, chest wall no deformity, BS normal  Cardiovascular: normal rate, regular rhythm  Abdomen: Soft, nontender, nondistended, normal bowel sounds  Skin: No gross lesions, rashes, bruising or bleeding on exposed skin area  Extremities:  peripheral pulses palpable, clubbing or edema  Psych: normal affect      Neurological examination:      Mental status   Alert and oriented x 3; following all commands;   speech is fluent, no dysarthria, aphasia.       Cranial nerves   II - visual fields intact to confrontation; pupils reactive  III, IV, VI  extraocular muscles intact; no TOPHER; no nystagmus; no ptosis   V - normal facial sensation                                                               VII - normal facial symmetry                                                             VIII - intact hearing                                                                             IX, X - symmetrical palate elevation                                               XI - symmetrical shoulder shrug                                                       XII - midline tongue without atrophy or fasciculation     Motor function  Strength:   5/5 RUE, 5/5 RLE  5/5 LUE, 5/5  LLE  Normal bulk and tone. Sensory function Intact to touch, pin, vibration, proprioception throughout     Cerebellar Intact finger-nose-finger testing. Intact heel-shin testing. No dysdiadochokinesia present. No tremors                        Reflex function 2/4 symmetric throughout . Downgoing plantar response bilaterally. (-)Cisneros's sign bilaterally      Gait                  Normal station and gait. Not tested              Diagnostics:      Laboratory Testing:  CBC:   Recent Labs     03/11/21  0719 03/11/21  1517 03/12/21  0538 03/13/21  0621   WBC 1.8* 1.8* 1.5* 2.1*   HGB 12.3*  --  12.0* 12.2*   PLT See Reflexed IPF Result  --  106* See Reflexed IPF Result       BMP:    Recent Labs     03/11/21  0719 03/12/21  0538 03/13/21  0621   * 133* 139   K 3.7 3.7 3.8    102 106   CO2 22 21 22   BUN 12 9 7   CREATININE 1.03 1.05 0.92   GLUCOSE 112* 84 89         Lab Results   Component Value Date    ALT 27 03/10/2021     (H) 03/10/2021    TSH 5.29 (H) 03/11/2021    INR 1.1 03/10/2021         No results found for: PHENYTOIN, PHENYTOIN, VALPROATE, CBMZ        Imaging/Diagnostics:        CT head 3/11/21: Normal.     MRI brain (w/wo) (3/11/21): unremarkable.      CSF (3/11/21): RBC 3000, WBC 6 (neutrophils 32, lymphs 22) , glucose 62, protein 61.8     EEG: Pending        Plan:         72-year-old male without significant past medical history comes in with generalized weakness, fever of unknown origin, nausea vomiting.        -Thrombocytopenia, elevated CRP   -Leukopenia  -Encephalopathy  -Sepsis  -Less likely malignancy  - Syncope            · EEG: awaiting read   · Othro static vitals to orthostatic hypotension  · B12: 646, folate level-14.7,  ·  BALBIR   · PT/OT  · Rest of the treatment as per primary  · Possible psych consult as and out patient                Patient was staffed with the attending Dr. Anjelica Dixon MD    Electronically signed by Irving Rai MD on 3/13/2021 at 7:44 AM      Amy Dailey MD  PGY 2 Neurology Resident  Neurology [facility]

## 2021-03-13 NOTE — PROCEDURES
[address] [facility] [address] [address]                          ELECTROENCEPHALOGRAM REPORT    PATIENT NAME: Daren Hu                  :        1965  MED REC NO:   [id number]                             ROOM:       3007  ACCOUNT NO:   [de-identified]                           ADMIT DATE: 2021  PROVIDER:     Jazlyn Jones    DATE OF EE2021    HISTORY:  This is a 77-year-old male with encephalopathy. He is being  evaluated for possible seizures. MEDICATIONS:  Include levothyroxine, multivitamin, thiamine,  doxycycline, ergocalciferol, pantoprazole. DESCRIPTION OF PROCEDURE:  Electrodes were applied using paste in  positions dictated by the International 10-20 System of placement. Reviewing montages included both referential and bipolar derivations. In addition to EEG data, EKG and eye movements were recorded. This is a  routine recording. This test was performed on 2021. DESCRIPTION OF ACTIVITIES:  At the onset of the study, the patient is  awake, and during wakefulness, there are occasional runs of 6-7 Hz theta  activity with low-voltage, high-frequency beta activity noted occurring  in bilateral anterior head regions. Electrocardiogram montage revealed  normal sinus rhythm. Background activity attenuated symmetrically with  eye opening and accentuated with eye closure. Photic stimulation did  not induce posterior driving responses. Hyperventilation is not  performed. This study is also significant for blink artifacts during  drowsiness. As the study progresses, there are occasional symmetric  vertex sharp waves noted. The study also showed no evidence of  epileptiform discharges noted. No electrographic seizures noted.     ELECTRODIAGNOSTIC INTERPRETATION:  This EEG performed during wakefulness  and drowsiness is mildly abnormal with diffuse slowing in theta  frequencies, consistent with mild encephalopathy of nonspecific  etiologies. This study did not demonstrate any evidence of epileptiform  discharges and electrographic seizures. Clinical correlation is  recommended.         Juju Escobar    D: 03/13/2021 15:47:28       T: 03/13/2021 15:58:29     [email or web address]  Job#: [id number]     Doc#: [id number]    CC:

## 2021-03-13 NOTE — PLAN OF CARE
Problem: Falls - Risk of:  Goal: Will remain free from falls  Description: Will remain free from falls  3/13/2021 0658 by Azul Ivy RN  Outcome: Ongoing  3/13/2021 0543 by Azul Ivy RN  Outcome: Ongoing  Goal: Absence of physical injury  Description: Absence of physical injury  3/13/2021 0658 by Azul Ivy RN  Outcome: Ongoing  3/13/2021 0543 by Azul Ivy RN  Outcome: Ongoing     Problem: Pain:  Goal: Pain level will decrease  Description: Pain level will decrease  3/13/2021 0658 by Azul Ivy RN  Outcome: Ongoing  3/13/2021 0543 by Azul Ivy RN  Outcome: Ongoing  Goal: Control of acute pain  Description: Control of acute pain  3/13/2021 0658 by Azul Ivy RN  Outcome: Ongoing  3/13/2021 0543 by Azul Ivy RN  Outcome: Ongoing  Goal: Control of chronic pain  Description: Control of chronic pain  3/13/2021 0658 by Azul Ivy RN  Outcome: Ongoing  3/13/2021 0543 by Azul Ivy RN  Outcome: Ongoing     Problem: Nutrition  Goal: Optimal nutrition therapy  Description: Nutrition Problem #1: Inadequate oral intake  Intervention: Food and/or Nutrient Delivery: Continue Current Diet, Start Oral Nutrition Supplement  Nutritional Goals: Pt to meet % of est'd needs via PO     3/13/2021 0658 by Azul Ivy RN  Outcome: Ongoing  3/13/2021 0543 by Azul Ivy RN  Outcome: Ongoing     Problem: IP BALANCE  Goal: BALANCE EDUCATION  Description: Educate patients on maintaining dynamic/static standing/sitting balance, with/without upper extremity support.   3/13/2021 0658 by Azul Ivy RN  Outcome: Ongoing  3/13/2021 0543 by Azul Ivy RN  Outcome: Ongoing     Problem: IP MOBILITY  Goal: LTG - patient will ambulate household distance  3/13/2021 0658 by Azul Ivy RN  Outcome: Ongoing  3/13/2021 0543 by Azul Ivy RN  Outcome: Ongoing

## 2021-03-13 NOTE — PROGRESS NOTES
[facility]  Office: [phone]  Harry Foss DO, Debra Butt, DO, Charlie Colmenares, DO, Preston Weathers, DO, Guru Kebede MD, Cecily Schlatter, MD, Cynthia Tariq MD, Jessica Hairston MD, Camille Landa MD, Zia Osei MD, Victoria Gatica MD, Basim Garcia MD, Kimmy Hale MD, Norman Linares DO, Joanne Liu MD, Aleisha Mahoney DO, Eboni Ware MD,  Matrin Jamil DO, Keith Ramirez MD, Chasity Childress MD, Kourtney Espana [facility], [facility] [place]), CNP, Galen Su, CNP, Kris Subramanian, [facility], Iraj Prater, [facility], Jules Nash, CNP, Edmundo Quinones, CNP, Braulio Elias, CNP, Kayla Dash, CNP, Freddy Anaya PA-C, Gray Garcia, [facility], Radha Snider, CNP, Rahul Clark, CNP, Juju Brice, CNP, Erin Gallego, CNP, Ricardo Glover, CNP, Jimmy Bansal, [address] [place] [address]    Progress Note    3/13/2021    9:04 AM    Name:   Nuria Urias  MRN:     [id number]     Acct:      [de-identified]   Room:   [address] [place] [address] Day:  4  Admit Date:  3/9/2021  7:34 PM    PCP:   No primary care provider on file. Code Status:  Full Code    Subjective:     C/C:   Chief Complaint   Patient presents with    Fatigue     x 2 days     Interval History Status: Not changed. Pt was seen and evaluated at bedside this morning. He reports feeling the same. Pt denies any new complaints today. Brief History: This is a 64year old male who came in with fatigue, shortness of breath, nausea and vomiting. Pt was noted to have sepsis, alcohol abuse, leukopenia, fatty liver and multiple labs abnormalities.      Review of Systems:     Constitutional:  negative for chills, fevers, sweats  Respiratory:  negative for cough, dyspnea on exertion, shortness of breath, wheezing  Cardiovascular:  negative for chest pain, chest pressure/discomfort, lower extremity edema, palpitations  Gastrointestinal:  negative for abdominal pain, constipation, diarrhea, nausea, vomiting Neurological:  negative for dizziness, headache    Medications: Allergies:  No Known Allergies    Current Meds:   Scheduled Meds:    sodium chloride flush  10 mL Intravenous 2 times per day    doxycycline hyclate  100 mg Oral 2 times per day    Ergocalciferol  2,000 Units Oral Daily    levothyroxine  50 mcg Oral Daily    sodium chloride flush  10 mL Intravenous 2 times per day    multivitamin  1 tablet Oral Daily    thiamine  100 mg Oral Daily    pantoprazole  40 mg Oral QAM AC    influenza virus vaccine  0.5 mL Intramuscular Prior to discharge    sodium chloride flush  10 mL Intravenous Once     Continuous Infusions:    sodium chloride 75 mL/hr at 21 2249     PRN Meds: sodium chloride flush, sodium chloride flush, sodium chloride flush, HYDROcodone 5 mg - acetaminophen, LORazepam, potassium chloride **OR** potassium alternative oral replacement **OR** potassium chloride    Data:     Past Medical History:   has a past medical history of GERD (gastroesophageal reflux disease) and Patient denies medical problems. Social History:   reports that he has quit smoking. His smoking use included cigarettes. He smoked 1.00 pack per day. He has never used smokeless tobacco. He reports current alcohol use. He reports previous drug use. Drug: Cocaine. Family History:   Family History   Problem Relation Age of Onset    Hypertension Mother     Hypertension Father        Vitals:  /71   Pulse 82   Temp 98.6 °F (37 °C) (Oral)   Resp 24   Ht 6' 5\" (1.956 m)   Wt 224 lb 13.9 oz (102 kg)   SpO2 99%   BMI 26.67 kg/m²   Temp (24hrs), Av.2 °F (37.3 °C), Min:98.6 °F (37 °C), Max:100 °F (37.8 °C)    No results for input(s): POCGLU in the last 72 hours. I/O (24Hr):     Intake/Output Summary (Last 24 hours) at 3/13/2021 0904  Last data filed at 3/13/2021 0824  Gross per 24 hour   Intake 2288 ml   Output 1375 ml   Net 913 ml       Labs:  Hematology:  Recent Labs     03/10/21  2256 03/10/21  2256 03/11/21 0719 03/11/21  1517 03/12/21  0538 03/12/21  1418 03/13/21  0621   WBC  --    < > 1.8* 1.8* 1.5*  --  2.1*   RBC  --   --  4.28  --  4.21  --  4.29   HGB  --   --  12.3*  --  12.0*  --  12.2*   HCT  --   --  38.3*  --  38.0*  --  37.8*   MCV  --   --  89.5  --  90.3  --  88.1   MCH  --   --  28.7  --  28.5  --  28.4   MCHC  --   --  32.1  --  31.6  --  32.3   RDW  --   --  14.1  --  14.2  --  14.1   PLT  --   --  See Reflexed IPF Result  --  106*  --  See Reflexed IPF Result   MPV  --   --  NOT REPORTED  --  12.5  --  NOT REPORTED   CRP 17.0*  --   --   --   --  7.5*  --    [id number]  --   --   --  65*  --   --   --    LABABSO  --   --   --  673  515  --   --   --     < > = values in this interval not displayed. Chemistry:  Recent Labs     03/11/21 0719 03/12/21 0538 03/13/21 0621   * 133* 139   K 3.7 3.7 3.8    102 106   CO2 22 21 22   GLUCOSE 112* 84 89   BUN 12 9 7   CREATININE 1.03 1.05 0.92   ANIONGAP 10 10 11   LABGLOM >60 >60 >60   GFRAA >60 >60 >60   CALCIUM 6.9* 7.3* 7.4*   CAION 0.98*  --   --      Recent Labs     03/11/21 0719   TSH 5.29*     ABG:No results found for: POCPH, PHART, PH, POCPCO2, XCP0TYW, PCO2, POCPO2, PO2ART, PO2, POCHCO3, XCB4SIY, HCO3, NBEA, PBEA, BEART, BE, THGBART, THB, WMX2NPM, AGBC5QTC, K6LNIUFR, O2SAT, FIO2  Lab Results   Component Value Date/Time    SPECIAL NOT REPORTED 03/11/2021 02:59 PM     Lab Results   Component Value Date/Time    CULTURE NO GROWTH 2 DAYS 03/11/2021 02:59 PM       Radiology:  Mri Lumbar Spine W Wo Contrast    Result Date: 3/10/2021  No acute abnormality or abnormal enhancement in lumbar spine. Degenerative disc disease without spinal canal narrowing. Foraminal narrowing, minimal at left L4-5 and bilateral L5-S1. Xr Chest Portable    Result Date: 3/9/2021  No acute process. Ct Chest Pulmonary Embolism W Contrast    Result Date: 3/9/2021  No evidence of pulmonary embolism or acute pulmonary abnormality.  1.7 cm left thyroid Recommend outpatient follow up  - FNA recommended    Giuliana Santana MD  3/13/2021  9:04 AM

## 2021-03-14 LAB
ABSOLUTE EOS #: 0.03 K/UL (ref 0–0.4)
ABSOLUTE IMMATURE GRANULOCYTE: 0 K/UL (ref 0–0.3)
ABSOLUTE LYMPH #: 1.63 K/UL (ref 1–4.8)
ABSOLUTE MONO #: 0.92 K/UL (ref 0.1–0.8)
ANION GAP SERPL CALCULATED.3IONS-SCNC: 7 MMOL/L (ref 9–17)
BASOPHILS # BLD: 0 % (ref 0–2)
BASOPHILS ABSOLUTE: 0 K/UL (ref 0–0.2)
BUN BLDV-MCNC: 7 MG/DL (ref 6–20)
BUN/CREAT BLD: ABNORMAL (ref 9–20)
CALCIUM SERPL-MCNC: 7.7 MG/DL (ref 8.6–10.4)
CHLORIDE BLD-SCNC: 109 MMOL/L (ref 98–107)
CMV DNA QUANTATATIVE INTERPRETATION: NOT DETECTED
CMV QUANT IU/ML: <227 IU/ML
CMV QUANT LOG IU/ML: <2.4 LOG IU/ML
CMV SOURCE: NORMAL
CMVQ COPY/ML: <390 CPY/ML
CO2: 23 MMOL/L (ref 20–31)
CREAT SERPL-MCNC: 0.81 MG/DL (ref 0.7–1.2)
CULTURE: NORMAL
CYTOMEGALOVIRUS QUANT. PCR: <2.6 LOG CPY/ML
DIFFERENTIAL TYPE: ABNORMAL
DIRECT EXAM: NORMAL
DIRECT EXAM: NORMAL
EOSINOPHILS RELATIVE PERCENT: 1 % (ref 1–4)
GFR AFRICAN AMERICAN: >60 ML/MIN
GFR NON-AFRICAN AMERICAN: >60 ML/MIN
GFR SERPL CREATININE-BSD FRML MDRD: ABNORMAL ML/MIN/{1.73_M2}
GFR SERPL CREATININE-BSD FRML MDRD: ABNORMAL ML/MIN/{1.73_M2}
GLUCOSE BLD-MCNC: 86 MG/DL (ref 70–99)
HCT VFR BLD CALC: 39.6 % (ref 40.7–50.3)
HEMOGLOBIN: 12.7 G/DL (ref 13–17)
IMMATURE GRANULOCYTES: 0 %
LYMPHOCYTES # BLD: 48 % (ref 24–44)
Lab: NORMAL
MCH RBC QN AUTO: 28.2 PG (ref 25.2–33.5)
MCHC RBC AUTO-ENTMCNC: 32.1 G/DL (ref 28.4–34.8)
MCV RBC AUTO: 88 FL (ref 82.6–102.9)
MONOCYTES # BLD: 27 % (ref 1–7)
MORPHOLOGY: NORMAL
NRBC AUTOMATED: 0 PER 100 WBC
PDW BLD-RTO: 14.2 % (ref 11.8–14.4)
PLATELET # BLD: 112 K/UL (ref 138–453)
PLATELET ESTIMATE: ABNORMAL
PMV BLD AUTO: 12.2 FL (ref 8.1–13.5)
POTASSIUM SERPL-SCNC: 3.9 MMOL/L (ref 3.7–5.3)
RBC # BLD: 4.5 M/UL (ref 4.21–5.77)
RBC # BLD: ABNORMAL 10*6/UL
SEG NEUTROPHILS: 24 % (ref 36–66)
SEGMENTED NEUTROPHILS ABSOLUTE COUNT: 0.82 K/UL (ref 1.8–7.7)
SODIUM BLD-SCNC: 139 MMOL/L (ref 135–144)
SPECIMEN DESCRIPTION: NORMAL
WBC # BLD: 3.4 K/UL (ref 3.5–11.3)
WBC # BLD: ABNORMAL 10*3/UL

## 2021-03-14 PROCEDURE — 85025 COMPLETE CBC W/AUTO DIFF WBC: CPT

## 2021-03-14 PROCEDURE — 99232 SBSQ HOSP IP/OBS MODERATE 35: CPT | Performed by: NURSE PRACTITIONER

## 2021-03-14 PROCEDURE — 99232 SBSQ HOSP IP/OBS MODERATE 35: CPT | Performed by: INTERNAL MEDICINE

## 2021-03-14 PROCEDURE — 6370000000 HC RX 637 (ALT 250 FOR IP): Performed by: INTERNAL MEDICINE

## 2021-03-14 PROCEDURE — 99233 SBSQ HOSP IP/OBS HIGH 50: CPT | Performed by: INTERNAL MEDICINE

## 2021-03-14 PROCEDURE — 80048 BASIC METABOLIC PNL TOTAL CA: CPT

## 2021-03-14 PROCEDURE — 36415 COLL VENOUS BLD VENIPUNCTURE: CPT

## 2021-03-14 PROCEDURE — 2060000000 HC ICU INTERMEDIATE R&B

## 2021-03-14 RX ORDER — LEVOTHYROXINE SODIUM 0.1 MG/1
100 TABLET ORAL DAILY
Status: DISCONTINUED | OUTPATIENT
Start: 2021-03-15 | End: 2021-03-15 | Stop reason: HOSPADM

## 2021-03-14 RX ORDER — DOXYCYCLINE HYCLATE 100 MG
100 TABLET ORAL EVERY 12 HOURS SCHEDULED
Qty: 20 TABLET | Refills: 0 | Status: SHIPPED | OUTPATIENT
Start: 2021-03-14 | End: 2021-03-24

## 2021-03-14 RX ADMIN — ERGOCALCIFEROL SOLN 200 MCG/ML (8000 UNIT/ML) 2000 UNITS: 8000 SOLUTION at 11:15

## 2021-03-14 RX ADMIN — ALCOHOL 1 TABLET: 70.47 GEL TOPICAL at 11:13

## 2021-03-14 RX ADMIN — Medication 100 MG: at 11:13

## 2021-03-14 RX ADMIN — DOXYCYCLINE HYCLATE 100 MG: 100 TABLET, COATED ORAL at 20:30

## 2021-03-14 RX ADMIN — LEVOTHYROXINE SODIUM 50 MCG: 50 TABLET ORAL at 07:00

## 2021-03-14 RX ADMIN — DOXYCYCLINE HYCLATE 100 MG: 100 TABLET, COATED ORAL at 11:13

## 2021-03-14 RX ADMIN — PANTOPRAZOLE SODIUM 40 MG: 40 TABLET, DELAYED RELEASE ORAL at 11:14

## 2021-03-14 ASSESSMENT — ENCOUNTER SYMPTOMS
BACK PAIN: 0
APNEA: 0
ABDOMINAL DISTENTION: 0
COLOR CHANGE: 0
VOMITING: 0
ABDOMINAL PAIN: 0
COUGH: 0
EYE ITCHING: 0
CHOKING: 0
NAUSEA: 0

## 2021-03-14 ASSESSMENT — PAIN SCALES - GENERAL: PAINLEVEL_OUTOF10: 0

## 2021-03-14 ASSESSMENT — PAIN DESCRIPTION - PROGRESSION
CLINICAL_PROGRESSION: NOT CHANGED

## 2021-03-14 NOTE — PROGRESS NOTES
Occupational Therapy    Occupational Therapy Not Seen Note    DATE: 3/14/2021  Name: Octavio Jason  : 1965  MRN: [id number]    Patient not available for Occupational Therapy due to:    Patient Declined: Pt agitated and did not want to do OT.     Next Scheduled Treatment: 3/15/21    Electronically signed by TAO Umanzor on 3/14/2021 at 2:57 PM

## 2021-03-14 NOTE — PROGRESS NOTES
NEUROLOGY INPATIENT PROGRESS NOTE    3/14/2021         Current Exam:     Chart reviewed. Discussed with RN. Patient not participating in any assessment or questioning today. Easily irritable with providers and staff. Brief History:    Fozia Antonio is a  64 y.o. male who was admitted on 3/9/2021 with fatigue and shortness of breath, was found to be febrile at 101. PE and Covid testing negative. Was started on broad-spectrum antibiotic therapy. HIV testing negative. Neurology is consulted for forgetfulness; the patient states he has not worked in over 10 years, has not been driving for many years and lives with friends. Mild cognitive impairment noted on detailed cognitive exam and patient has a flat affect. Otherwise neurologic exam is unremarkable. CT head normal.  MRI brain unremarkable. CSF with RBC 3000, WBC 6, glucose 62, protein 61.8. EEG consistent with mild encephalopathy with no epileptiform discharges. He was found to have leukopenia, lymphopenia, thrombocytopenia, elevated CRP, early rhabdo, and hypothyroidism. Heme-onc and ID are both following along on the case. Vitamin B12, folate within normal limits. BALBIR is pending. No current facility-administered medications on file prior to encounter. No current outpatient medications on file prior to encounter. Allergies: [facility] has No Known Allergies. Past Medical History:   Diagnosis Date    GERD (gastroesophageal reflux disease)     Patient denies medical problems        History reviewed. No pertinent surgical history. Social History: Fozia Antonio  reports that he has quit smoking. His smoking use included cigarettes. He smoked 1.00 pack per day. He has never used smokeless tobacco. He reports current alcohol use. He reports previous drug use. Drug: Cocaine.     Family History   Problem Relation Age of Onset    Hypertension Mother     Hypertension Father        Objective:   /75   Pulse 69   Temp 98.1 °F (36.7 °C) (Oral)   Resp 18   Ht 6' 5\" (1.956 m)   Wt 224 lb 13.9 oz (102 kg)   SpO2 98%   BMI 26.67 kg/m²     Blood pressure range: Systolic (89PYY), LJR:531 , Min:101 , PKB:901   ; Diastolic (68QGM), VW, Min:68, Max:79      Review of Systems:  Constitutional  Negative for fever and chills    HEENT  Negative for ear discharge, ear pain, nosebleed    Eyes  Negative for photophobia, pain and discharge    Respiratory  Negative for hemoptysis and sputum    Cardiovascular  Negative for orthopnea, claudication and PND    Gastrointestinal  Negative for abdominal pain, diarrhea, blood in stool    Musculoskeletal  Negative for joint pain, negative for myalgia    Skin  Negative for rash or itching    Endo/heme/allergies  Negative for polydipsia, environmental allergy    Psychiatric/behavioral  Negative for suicidal ideation.  Patient is not anxious        NEUROLOGIC EXAMINATION  GENERAL  Appears comfortable and in no distress   HEENT  NC/ AT   NECK  Supple   MENTAL STATUS:  Alert, oriented, intact memory, no obvious confusion, normal speech, normal language, no hallucination or delusion   CRANIAL NERVES:  CN II-XII grossly intact; limited by level of participation by the patient   MOTOR FUNCTION:  Moves all limbs without apparent difficulty; unable to assess formal strength due to limited participation by the patient   SENSORY FUNCTION:  Grossly intact   CEREBELLAR FUNCTION:  No tremor   REFLEX FUNCTION:  Deferred   STATION and GAIT  deferred       Data:    Lab Results:   CBC:   Recent Labs     21  0538 21  0621 21  0543   WBC 1.5* 2.1* 3.4*   HGB 12.0* 12.2* 12.7*   * See Reflexed IPF Result 112*     BMP:    Recent Labs     21  0538 21  0621 21  0543   * 139 139   K 3.7 3.8 3.9    106 109*   CO2 21 22 23   BUN 9 7 7   CREATININE 1.05 0.92 0.81   GLUCOSE 84 89 86         Lab Results   Component Value Date    ALT 27 03/10/2021     (H) 03/10/2021    TSH 5.29 (H) 03/11/2021    INR 1.1 03/10/2021    [id number] 646 03/12/2021           Diagnostic data reviewed:  CT head 3/11/21: Normal.     MRI brain (w/wo) (3/11/21): unremarkable.     CSF (3/11/21): RBC 3000, WBC 6 (neutrophils 32, lymphs 22) , glucose 62, protein 61.8     EEG 3/12/21: Mildly abnormal EEG with a diffuse slowing in theta frequencies consistent with mild encephalopathy of nonspecific etiologies. This study did not demonstrate any evidence of epileptiform discharges/electrographic seizures. Clinical correlation is recommended. Impression:  -Encephalopathy of nonspecific nature  -History of drug and alcohol abuse  -Thrombocytopenia; improving    Plan:  -BALBIR pending  -ID and heme-onc following  -Patient does not participate in questioning or physical exam.  Work-up largely unremarkable at this time. Neurology to sign off. Please call with any questions. Please note that this note was generated using a voice recognition dictation software. Although every effort was made to ensure the accuracy of this automated transcription, some errors in transcription may have occurred.

## 2021-03-14 NOTE — PROGRESS NOTES
Pt refused second assessment states he just wants to be left alone and the Dr to discharge him, he agreed to take medications and stay on telemonitor. Writer will continue Q 1Hr rounding.

## 2021-03-14 NOTE — PROGRESS NOTES
Pt stated he wanted to take a walk and requested a wheelchair, writer explained the risk of patient leaving unit, pt stated understanding, signed off unit paper. Wheelchair provided.

## 2021-03-14 NOTE — PLAN OF CARE
Problem: Falls - Risk of:  Goal: Will remain free from falls  Description: Will remain free from falls  3/13/2021 2050 by Jesse Escoto RN  Outcome: Ongoing  3/13/2021 0658 by Meg Hale RN  Outcome: Ongoing  Goal: Absence of physical injury  Description: Absence of physical injury  3/13/2021 2050 by Jesse Escoto RN  Outcome: Ongoing  3/13/2021 0658 by Meg Hale RN  Outcome: Ongoing     Problem: Pain:  Goal: Pain level will decrease  Description: Pain level will decrease  3/13/2021 2050 by Jesse Escoto RN  Outcome: Ongoing  3/13/2021 0658 by Meg Hale RN  Outcome: Ongoing  Goal: Control of acute pain  Description: Control of acute pain  3/13/2021 2050 by Jesse Escoto RN  Outcome: Ongoing  3/13/2021 0658 by Meg Hale RN  Outcome: Ongoing  Goal: Control of chronic pain  Description: Control of chronic pain  3/13/2021 2050 by Jesse Escoto RN  Outcome: Ongoing  3/13/2021 0658 by Meg Hale RN  Outcome: Ongoing     Problem: Nutrition  Goal: Optimal nutrition therapy  Description: Nutrition Problem #1: Inadequate oral intake  Intervention: Food and/or Nutrient Delivery: Continue Current Diet, Start Oral Nutrition Supplement  Nutritional Goals: Pt to meet % of est'd needs via PO     3/13/2021 2050 by Jesse Escoto RN  Outcome: Ongoing  3/13/2021 0658 by Meg Hale RN  Outcome: Ongoing     Problem: IP BALANCE  Goal: BALANCE EDUCATION  Description: Educate patients on maintaining dynamic/static standing/sitting balance, with/without upper extremity support.   3/13/2021 2050 by Jesse Escoto RN  Outcome: Ongoing  3/13/2021 0658 by Meg Hale RN  Outcome: Ongoing     Problem: IP MOBILITY  Goal: LTG - patient will ambulate household distance  3/13/2021 2050 by Jesse Escoto RN  Outcome: Ongoing  3/13/2021 0658 by Meg Hale RN  Outcome: Ongoing

## 2021-03-14 NOTE — PROGRESS NOTES
[facility] -   Infectious diseases evaluation  admission date 3/9/2021    reason for consultation:   sepsis    Impression :   Current:  · Leukopenia  · Lymphopenia  · Thrombocytopenia  · False + HIV - no HIV disease  · CRP elevation  · SIRS - sepsis  · Fatigue myalgias frontal headache fever irritability  · Early rhabdomyolysis  · Encephalopathy -MRI/ CSF neg  · Hypothyroidism  · Thyroid nodule  · Vitamin D deficiency      Other:  · QTC   Discussion / summary of stay / plan of care   · EEG compatible with mild encephalopathy  Recommendations   Concern for leukemia and chronic DIC  VS viral infection  Less likely severe sepsis, due to poor response of the CRP and procalcitonins compared to the level of neutropenia.  vanco ceftriaxone to be stopped, stop acyclovir  · zithromax- switch 3/12 to doxy for possible rickettsie  · Blood cultures remain negative  · Parvovirus ab, IGG and IGM pending  · BALBIR profile pending  · Appreciate neurology and oncology opinions  · Might benefit from bone marrow biopsy  · With this be all related to hypothyroidism? Doubt      Infection Control Recommendations   · [place] Precautions  · Contact Isolation     Antimicrobial Stewardship Recommendations   · Simplification of therapy  · Targeted therapy  · IV to oral conversion  · Per Kg dosing    Coordination ofOutpatient Care:   · Estimated Length of IV antimicrobials:  · Patient will need Midline / picc Catheter Insertion:   · Patient will need SNF:  · Patient will need outpatient wound care:     History of Present Illness:   Initial history:  Kal Marcial is a 64y.o.-year-old male presents w fatigue and SOB x  Days, unclear how long, migth be a week he says, nausea or vomiting and feverish.myalgias weakness, poor appetite. Found w fever 101 and CT neg for pneumonia or for PE - covid neg. Elevated infl markers and pancytopenia. Young cells in blood smear.  Early rhabdo and elevated DDimer. Started on AB broad coverage. BC ordered  UA non ionfective    Has a LBP and very irritable on exam but appropriate - oriented - does not remember the time frame he was sick - has a frontal headache but no photophobia. Interval changes  3/14/2021   Patient Vitals for the past 8 hrs:   BP Temp Temp src Pulse Resp SpO2 Weight   03/14/21 0600 117/75 98.1 °F (36.7 °C) Oral 69 18 98 % 224 lb 13.9 oz (102 kg)   03/14/21 0400    70      no fever, vitals stable  Poor memory - looks better in general WBC better increasing and plts 112  Oncology wanting to FU outpt  Lyme neg  Further tests are still pend    infl markers still normal contrasting w neutropenia  Confirmatory HIV is neg  MRI LS Osteoarthritis - no infection  All cx neg  Platelets remain 435 range  LP neg-   CSF Lyme negative  Repeat CRP remains low  MRI and CT head neg  Echo plasma negative, CMV negative, EBV shows old active disease but no acuity  Hypothyroidism, low reticulocyte count  EEG showing theta slowing suggesting encephalopathy    B12, folate normal  BALBIR pending    Summary of relevant labs:  Labs:  W 1.2 - 1.8 - 1.8 - 1.5 - 2.1 - 3.4  HCt 47 -38 - 37.8 - 39.6  plts 111 -106 - 103 - 112    CK 2067    Legionella Pneumophilia Ag, Urine NEGATIVE   [id number]   Procalcitonin0.16 - 0.11  CRP 30 - 17 - 7.5    Creat 1.35-1.06-1.03-1.05-0.92-0.81  DDimer 12-6.7    Micro:  covid rapid neg  UA not infective  HIV + screen  HIV-1 AB HIV-2 AB neg  CD4 /CD8 3.42  CD4  515    resp PCR neg  leguinella U AG neg  Mycoplasma IgM negative  CMV IgM negative  EBV shows active old disease  Meningitis encephalitis panel nega  cSF 6 WBCs  HSV neg    Imaging:  CT Head 3/11/21: Normal    MRI brain 3/11/21: Unremarkable    MRI LS No acute abnormality or abnormal enhancement in lumbar spine. Degenerative disc disease without spinal canal narrowing.    Foraminal narrowing, minimal at left L4-5 and bilateral L5-S1     CT chest - no PE - personally reviewed and no ground glass or pneumonia  - hence not a picture of covid    I have personally reviewed the past medical history, past surgical history, medications, social history, and family history, and I haveupdated the database accordingly. Allergies:   Patient has no known allergies. Review of Systems:     Review of Systems   Constitutional: Positive for activity change and fatigue. Negative for fever. HENT: Negative for congestion. Eyes: Negative for itching. Respiratory: Negative for apnea, cough and choking. Cardiovascular: Negative for chest pain. Gastrointestinal: Negative for abdominal distention, abdominal pain, nausea and vomiting. Endocrine: Negative for heat intolerance. Genitourinary: Negative for dysuria and flank pain. Musculoskeletal: Negative for arthralgias and back pain. Skin: Negative for color change and rash. Allergic/Immunologic: Negative for food allergies. Neurological: Positive for headaches. Negative for dizziness and seizures. Hematological: Negative for adenopathy. Bruises/bleeds easily. Psychiatric/Behavioral: Positive for agitation and decreased concentration. Physical Examination :       Physical Exam  Constitutional:       Appearance: Normal appearance. He is ill-appearing. HENT:      Head: Normocephalic and atraumatic. Nose: Nose normal.      Mouth/Throat:      Mouth: Mucous membranes are moist.   Eyes:      General: No scleral icterus. Conjunctiva/sclera: Conjunctivae normal.   Neck:      Musculoskeletal: Neck supple. No neck rigidity or muscular tenderness. Cardiovascular:      Rate and Rhythm: Normal rate and regular rhythm. Heart sounds: Normal heart sounds. No murmur. Pulmonary:      Effort: No respiratory distress. Breath sounds: Normal breath sounds. Abdominal:      General: Abdomen is flat. There is no distension. Palpations: Abdomen is soft. Tenderness: There is no abdominal tenderness. Genitourinary:     Comments: No diaz  Musculoskeletal:         General: No swelling or deformity. Right lower leg: No edema. Left lower leg: No edema. Skin:     General: Skin is dry. Coloration: Skin is not jaundiced or pale. Findings: No bruising, erythema or rash. Neurological:      General: No focal deficit present. Mental Status: He is alert. Cranial Nerves: No cranial nerve deficit. Psychiatric:         Mood and Affect: Mood normal.      Comments: Very irritable but making sense - poor historian for the details of the illness but appropriate otherwise         Past Medical History:     Past Medical History:   Diagnosis Date    GERD (gastroesophageal reflux disease)     Patient denies medical problems        Past Surgical  History:   History reviewed. No pertinent surgical history.     Medications:      [START ON 3/15/2021] levothyroxine  100 mcg Oral Daily    sodium chloride flush  10 mL Intravenous 2 times per day    doxycycline hyclate  100 mg Oral 2 times per day    Ergocalciferol  2,000 Units Oral Daily    sodium chloride flush  10 mL Intravenous 2 times per day    multivitamin  1 tablet Oral Daily    thiamine  100 mg Oral Daily    pantoprazole  40 mg Oral QAM AC    influenza virus vaccine  0.5 mL Intramuscular Prior to discharge    sodium chloride flush  10 mL Intravenous Once       Social History:     Social History     Socioeconomic History    Marital status: Single     Spouse name: Not on file    Number of children: Not on file    Years of education: Not on file    Highest education level: Not on file   Occupational History    Not on file   Social Needs    Financial resource strain: Not on file    Food insecurity     Worry: Not on file     Inability: Not on file    Transportation needs     Medical: Not on file     Non-medical: Not on file   Tobacco Use    Smoking status: Former Smoker     Packs/day: 1.00     Types: Cigarettes    Smokeless tobacco: Never Used   Substance and Sexual Activity    Alcohol use: Yes     Comment: pt states he \"drinks beer once a week\"    Drug use: Not Currently     Types: Cocaine    Sexual activity: Not Currently   Lifestyle    Physical activity     Days per week: Not on file     Minutes per session: Not on file    Stress: Not on file   Relationships    Social connections     Talks on phone: Not on file     Gets together: Not on file     Attends [employer] service: Not on file     Active member of club or organization: Not on file     Attends meetings of clubs or organizations: Not on file     Relationship status: Not on file    Intimate partner violence     Fear of current or ex partner: Not on file     Emotionally abused: Not on file     Physically abused: Not on file     Forced sexual activity: Not on file   Other Topics Concern    Not on file   Social History Narrative    Not on file       Family History:     Family History   Problem Relation Age of Onset    Hypertension Mother     Hypertension Father       Medical Decision Making:   I have independently reviewed/ordered the following labs:    CBC with Differential:   Recent Labs     03/13/21  0621 03/14/21  0543   WBC 2.1* 3.4*   HGB 12.2* 12.7*   HCT 37.8* 39.6*   PLT See Reflexed IPF Result 112*   LYMPHOPCT 61* 48*   MONOPCT 22* 27*     BMP:  Recent Labs     03/13/21  0621 03/14/21  0543    139   K 3.8 3.9    109*   CO2 22 23   BUN 7 7   CREATININE 0.92 0.81     Hepatic Function Panel:   No results for input(s): PROT, LABALBU, BILIDIR, IBILI, BILITOT, ALKPHOS, ALT, AST in the last 72 hours. No results for input(s): RPR in the last 72 hours. No results for input(s): HIV in the last 72 hours. No results for input(s): BC in the last 72 hours. Lab Results   Component Value Date    CREATININE 0.81 03/14/2021    GLUCOSE 86 03/14/2021       Detailed results: Thank you for allowing us to participate in the care of this patient. Please call with questions. This note is created with the assistance of a speech recognition program.  While intending to generate adocument that actually reflects the content of the visit, the document can still have some errors including those of syntax and sound a like substitutions which may escape proof reading. It such instances, actual meaningcan be extrapolated by contextual diversion. Jammie Warners  Office: [phone]  Perfect serve / office [phone]      I have discussed the care of the patient, including pertinent history and exam findings,  with the student. I have seen and examined the patient and the key elements of all parts of the encounter have been performed by me.   I agree with the assessment, plan and orders as documented by the student    Natalie Plaza, Infectious Diseases

## 2021-03-14 NOTE — PROGRESS NOTES
Progress Notes                  Today's Date: 3/14/2021  Patient Name: Qing Joaquin  Date of admission: 3/9/2021  7:34 PM  Patient's age: 64 y. o., 1965  [facility] [A41.9]          CHIEF COMPLAINT:  Fatigue    History Obtained From:  patient, electronic medical record    Interval History:  Pt seen and examined  Answering questions but somewhat irritable  Improvement in WBC today, explained neutropenia appears resolving    HISTORY OF PRESENT ILLNESS:      Patient 59-year-old male admitted for sepsis. Patient reports for past 2 days he had noticed increasing SOB, fatigue, nausea, vomiting. Additionally complaining of generalized myalgias, low back pain. Denies productive sputum, sick contacts, changes in bowel or bladder function. In the ED, patient found to be febrile at 101.2, HR 94, RR highest 21. Initial lab work shows significant neutropenia WBC at 1.2, mild thrombocytopenia with platelets at 983. Ferritin and procalcitonin elevated. Chest x-ray and CT chest shows no acute abnormalities. Given concern for sepsis, sepsis protocol initiated patient admitted for management. Evaluated by infectious disease, noted increased percentage of immature white blood cells which prompted oncology consult for evaluation of possible leukemia. Past Medical History:   has a past medical history of GERD (gastroesophageal reflux disease) and Patient denies medical problems. Past Surgical History:   has no past surgical history on file.      Home Medications:    Prior to Admission medications    Not on File     Current Facility-Administered Medications   Medication Dose Route Frequency Provider Last Rate Last Admin    [START ON 3/15/2021] levothyroxine (SYNTHROID) tablet 100 mcg  100 mcg Oral Daily Giuliana Santana MD        sodium chloride flush 0.9 % injection 10 mL  10 mL Intravenous 2 times per day KARY Ayers CNP   10 mL at 03/13/21 0811    sodium chloride flush 0.9 % injection 10 mL  10 mL Intravenous PRN KARY Gaston - CNP        doxycycline hyclate (VIBRA-TABS) tablet 100 mg  100 mg Oral 2 times per day Natalie Campo MD   100 mg at 03/14/21 1113    Ergocalciferol (Drisdol) 200 MCG/ML drops 2,000 Units  2,000 Units Oral Daily Rueben Severin, MD   2,000 Units at 03/14/21 1115    sodium chloride flush 0.9 % injection 10 mL  10 mL Intravenous PRN James Gruber MD        0.9 % sodium chloride infusion   Intravenous Continuous Rueben Severin, MD 75 mL/hr at 03/13/21 1345 New Bag at 03/13/21 1345    sodium chloride flush 0.9 % injection 10 mL  10 mL Intravenous 2 times per day KARY Valdez CNP   10 mL at 03/13/21 2040    sodium chloride flush 0.9 % injection 10 mL  10 mL Intravenous PRN KARY Valdez CNP        HYDROcodone-acetaminophen [facility]) 5-325 MG per tablet 1 tablet  1 tablet Oral Q6H PRN Devan Preston DO   1 tablet at 03/11/21 0355    multivitamin 1 tablet  1 tablet Oral Daily Devan Preston DO   1 tablet at 03/14/21 1113    thiamine tablet 100 mg  100 mg Oral Daily Devan Preston DO   100 mg at 03/14/21 1113    LORazepam (ATIVAN) injection 1 mg  1 mg Intravenous Q4H PRN Devan Preston DO        pantoprazole (PROTONIX) tablet 40 mg  40 mg Oral QAM AC Devan Preston DO   40 mg at 03/14/21 1114    potassium chloride (KLOR-CON M) extended release tablet 40 mEq  40 mEq Oral PRN Devan Preston DO        Or    potassium bicarb-citric acid (EFFER-K) effervescent tablet 40 mEq  40 mEq Oral PRN Devan Preston DO        Or    potassium chloride 10 mEq/100 mL IVPB (Peripheral Line)  10 mEq Intravenous PRN Devan Preston DO        influenza quadrivalent split vaccine (FLUZONE;FLUARIX;FLULAVAL;AFLURIA) injection 0.5 mL  0.5 mL Intramuscular Prior to discharge Devan Kary, DO        sodium chloride flush 0.9 % injection 10 mL  10 mL Intravenous Once Natalie Plaza, MD           Allergies:  Patient has no known allergies. Social History:   reports that he has quit smoking. His smoking use included cigarettes. He smoked 1.00 pack per day. He has never used smokeless tobacco. He reports current alcohol use. He reports previous drug use. Drug: Cocaine. Family History: family history includes Hypertension in his father and mother. REVIEW OF SYSTEMS:      Constitutional: No fever or chills. No night sweats, no weight loss   Eyes: No eye discharge, double vision, or eye pain   HEENT: negative for sore mouth, sore throat, hoarseness and voice change   Respiratory: negative for cough , sputum, dyspnea, wheezing, hemoptysis, chest pain   Cardiovascular: negative for chest pain, dyspnea, palpitations, orthopnea, PND   Gastrointestinal: negative for nausea, vomiting, diarrhea, constipation, or  abdominal pain   Genitourinary: negative for frequency, dysuria, nocturia, urinary incontinence, and hematuria   Hematologic/Lymphatic: negative for easy bruising, bleeding, petechiae or lymphadenopathy  Endocrine: negative for heat or cold intolerance,   Musculoskeletal: negative for myalgias, arthralgias, pain, joint swelling,and bone pain   Neurological: negative for headaches, dizziness, seizures, weakness, or numbness  Integument: negative for rash, skin lesions, bruises.     PHYSICAL EXAM:        Vitals:  /72   Pulse 67   Temp 98.3 °F (36.8 °C) (Oral)   Resp 16   Ht 6' 5\" (1.956 m)   Wt 224 lb 13.9 oz (102 kg)   SpO2 98%   BMI 26.67 kg/m²     General appearance - well appearing, not in pain or distress, not jaundiced   Mental status - alert and cooperative   Eyes - pupils equal and reactive, extraocular eye movements intact   Ears - bilateral TM's and external ear canals normal   Mouth - mucous membranes moist, pharynx normal without lesions,   Neck - supple, no palpable  adenopathy , trach midline   Lymphatics - no palpable lymphadenopathy, no hepatosplenomegaly   Chest - clear to auscultation, no wheezes, rales or rhonchi, symmetric air entry , normal chest expansion  Heart - normal rate, regular rhythm, normal S1, S2, no murmurs,  Abdomen - soft, nontender, nondistended, no masses or organomegaly   Neurological - alert, oriented, normal speech, no focal findings or movement disorder noted   Musculoskeletal - no joint tenderness, deformity or swelling   Extremities - peripheral pulses normal, no pedal edema, no clubbing or cyanosis   Skin - normal coloration and turgor, no rashes, no suspicious skin lesions noted ,   Port: site of port not red, no tenderness    DATA:      Labs:   [unfilled]    CBC:   Recent Labs     03/13/21  0621 03/14/21  0543   WBC 2.1* 3.4*   HGB 12.2* 12.7*   HCT 37.8* 39.6*   PLT See Reflexed IPF Result 112*     BMP:   Recent Labs     03/13/21  0621 03/14/21  0543    139   K 3.8 3.9   CO2 22 23   BUN 7 7   CREATININE 0.92 0.81   LABGLOM >60 >60   GLUCOSE 89 86     PT/INR:   No results for input(s): PROTIME, INR in the last 72 hours. APTT:  No results for input(s): APTT in the last 72 hours. LIVER PROFILE:  No results for input(s): AST, ALT, LABALBU in the last 72 hours.         IMPRESSION:      Patient Active Problem List   Diagnosis    Suspected COVID-19 virus infection    Other chest pain    Gastrointestinal hemorrhage    ETOH abuse    Transaminasemia    Acute dehydration    SIRS (systemic inflammatory response syndrome) (HCC)    Hematuria    Cystitis    Cocaine use    Fatty liver    GI bleed    Sepsis ([address]    Leukopenia    Thrombocytopenia (HCC)    Vitamin D deficiency    JACLYN (acute kidney injury) ([address]    Hyponatremia    Hypocalcemia    Hypokalemia    Thyroid nodule, left side    Hypoalbuminemia    Hypoxia    Fatigue    Encephalopathy    Hypothyroidism    History of ETOH abuse    History of substance abuse [facility])     Active Hospital Problems    Diagnosis Date Noted    Encephalopathy [G93.40]     Hypothyroidism [E03.9]     History of ETOH abuse [F10.11]     History of substance abuse ([facility] [F19.11]     Leukopenia [D72.819] 03/10/2021    [facility] [D69.6] 03/10/2021    Vitamin D deficiency [E55.9] 03/10/2021    [facility]ACLYN (acute kidney injury) [facility] [N17.9] 03/10/2021    Hyponatremia [E87.1] 03/10/2021    Hypocalcemia [E83.51] 03/10/2021    Hypokalemia [E87.6] 03/10/2021    Thyroid nodule, left side [E04.1] 03/10/2021    Hypoalbuminemia [E88.09] 03/10/2021    Hypoxia [R09.02]     Fatigue [R53.83]     [facility] [A41.9] 03/09/2021    Fatty liver [K76.0] 04/13/2020    ETOH abuse [F10.10] 04/12/2020    Acute dehydration [E86.0] 04/12/2020     Thrombocytopenia: Given patient's alcohol abuse and characteristic patterns on LFTs, likely from combination of alcohol use and sepsis, immature cells not too concerning  Sepsis: Possible viral infection, unknown source at this time, ID following with infectious panel  Thyroid nodule: Large nodule on left side measuring 1.7 cm seen on CT, previous CT last year showed low-density 2 cm nodule, no TSH has been done  JACLYN  Ethanol abuse  Fatty liver    RECOMMENDATIONS:  1. Given that it is unlikely leukemia, will not require bone marrow biopsy at this time  2. Antibiotic therapy as per ID  3. WBC improving, likely from improving infection  4. Given persistent thyroid nodule over 1 cm in size with minimal TSH changes FNA will be needed to rule out malignant pathology. TSH done only showed minimal elevation of 5.29, slight decrease in T4. Not enough to result in BM suppression. 5. Continue to monitor CBC  6. HIV 1/2 Ab confirmatory test Negative, HIV western blotting and viral load counts ordered by ID.   7. Ok to discharge from heme perspective, follow up with CBC in 2 wks. Ashely Alvarenga, PGY 3    Attending Physician Statement   I have discussed the care of this patient, including pertinent history and exam findings, with the resident.  I have seen and examined the

## 2021-03-14 NOTE — PROGRESS NOTES
[facility]  Office: [phone]  Oren Hagan DO, Minda Luna DO, Joaquin Gutierrez DO, Daria Weathers DO, Adeline Stephen MD, Ayanna Chacon MD, Enedelia Mitchell MD, Lesli Barnett MD, Julius Ivey MD, Lizz Issa MD, Olivier Daniel MD, Jerrod Chu MD, Kimmy Cunha MD, Daquan Mahoney DO, Robinson Cisse MD, Refugio Kaplan DO, Shelton Hair MD,  Saulo Cunningham DO, Neno Conrad MD, George Orantes MD, Marcus Davis CNP, [facility], CNP, Lacey Quintanilla, CNP, Uriah Gracia, CNS, Carmen Stanley, CNP, Loi Dixon, CNP, Betty Meeks, CNP, Shabana Reed, CNP, Lucille Hernandez, CNP, Parviz Baez PA-C, Stephanie Jade, [facility], Tracey Coyle, CNP, Fifi Marrero, CNP, Sariah Lyon, CNP, Rozina Cunningham, CNP, Ramez Carlos, CNP, Magda Hanson, [address] [place] [address]    Progress Note    3/14/2021    9:44 AM    Name:   Garrison Frazier  MRN:     [id number]     Acct:      [de-identified]   Room:   [address] [place] [address] Day:  5  Admit Date:  3/9/2021  7:34 PM    PCP:   No primary care provider on file. Code Status:  Full Code    Subjective:     C/C:   Chief Complaint   Patient presents with    Fatigue     x 2 days     Interval History Status: Not changed. Pt was seen and evaluated at bedside this morning. Pt is getting frustrated. Not cooperating. He is stating that he is tired of being asked same question over and over again. Brief History: This is a 64year old male who came in with fatigue, shortness of breath, nausea and vomiting. Pt was noted to have sepsis, alcohol abuse, leukopenia, fatty liver and multiple labs abnormalities.      Review of Systems:     Constitutional:  negative for chills, fevers, sweats  Respiratory:  negative for cough, dyspnea on exertion, shortness of breath, wheezing  Cardiovascular:  negative for chest pain, chest pressure/discomfort, lower extremity edema, palpitations  Gastrointestinal:  negative for abdominal pain, constipation, diarrhea, nausea, vomiting  Neurological:  negative for dizziness, headache    Medications: Allergies:  No Known Allergies    Current Meds:   Scheduled Meds:    [START ON 3/15/2021] levothyroxine  100 mcg Oral Daily    sodium chloride flush  10 mL Intravenous 2 times per day    doxycycline hyclate  100 mg Oral 2 times per day    Ergocalciferol  2,000 Units Oral Daily    sodium chloride flush  10 mL Intravenous 2 times per day    multivitamin  1 tablet Oral Daily    thiamine  100 mg Oral Daily    pantoprazole  40 mg Oral QAM AC    influenza virus vaccine  0.5 mL Intramuscular Prior to discharge    sodium chloride flush  10 mL Intravenous Once     Continuous Infusions:    sodium chloride 75 mL/hr at 21 1345     PRN Meds: sodium chloride flush, sodium chloride flush, sodium chloride flush, HYDROcodone 5 mg - acetaminophen, LORazepam, potassium chloride **OR** potassium alternative oral replacement **OR** potassium chloride    Data:     Past Medical History:   has a past medical history of GERD (gastroesophageal reflux disease) and Patient denies medical problems. Social History:   reports that he has quit smoking. His smoking use included cigarettes. He smoked 1.00 pack per day. He has never used smokeless tobacco. He reports current alcohol use. He reports previous drug use. Drug: Cocaine. Family History:   Family History   Problem Relation Age of Onset    Hypertension Mother     Hypertension Father        Vitals:  /75   Pulse 69   Temp 98.1 °F (36.7 °C) (Oral)   Resp 18   Ht 6' 5\" (1.956 m)   Wt 224 lb 13.9 oz (102 kg)   SpO2 98%   BMI 26.67 kg/m²   Temp (24hrs), Av.6 °F (37 °C), Min:98.1 °F (36.7 °C), Max:99 °F (37.2 °C)    No results for input(s): POCGLU in the last 72 hours. I/O (24Hr):     Intake/Output Summary (Last 24 hours) at 3/14/2021 0944  Last data filed at 3/14/2021 0600  Gross per 24 hour   Intake 2326.5 ml   Output 2000 ml Net 326.5 ml       Labs:  Hematology:  Recent Labs     03/11/21  1517 03/12/21  0538 03/12/21  1418 03/13/21  0621 03/14/21  0543   WBC 1.8* 1.5*  --  2.1* 3.4*   RBC  --  4.21  --  4.29 4.50   HGB  --  12.0*  --  12.2* 12.7*   HCT  --  38.0*  --  37.8* 39.6*   MCV  --  90.3  --  88.1 88.0   MCH  --  28.5  --  28.4 28.2   MCHC  --  31.6  --  32.3 32.1   RDW  --  14.2  --  14.1 14.2   PLT  --  106*  --  See Reflexed IPF Result 112*   MPV  --  12.5  --  NOT REPORTED 12.2   CRP  --   --  7.5*  --   --    IZ2GQZGJ 65*  --   --   --   --    LABABSO 673  515  --   --   --   --      Chemistry:  Recent Labs     03/12/21  0538 03/13/21  0621 03/14/21  0543   * 139 139   K 3.7 3.8 3.9    106 109*   CO2 21 22 23   GLUCOSE 84 89 86   BUN 9 7 7   CREATININE 1.05 0.92 0.81   ANIONGAP 10 11 7*   LABGLOM >60 >60 >60   GFRAA >60 >60 >60   CALCIUM 7.3* 7.4* 7.7*     No results for input(s): PROT, LABALBU, LABA1C, L9RGKQV, L2DFAMI, FT4, TSH, AST, ALT, LDH, GGT, ALKPHOS, LABGGT, BILITOT, BILIDIR, AMMONIA, AMYLASE, LIPASE, LACTATE, CHOL, HDL, LDLCHOLESTEROL, CHOLHDLRATIO, TRIG, VLDL, NZL58HX, PHENYTOIN, PHENYF, URICACID, POCGLU in the last 72 hours. ABG:No results found for: POCPH, PHART, PH, POCPCO2, SJG7KUM, PCO2, POCPO2, PO2ART, PO2, POCHCO3, HEF0ZPL, HCO3, NBEA, PBEA, BEART, BE, THGBART, THB, NLX4AIH, HSPY7WPT, J6RJULDO, O2SAT, FIO2  Lab Results   Component Value Date/Time    SPECIAL NOT REPORTED 03/11/2021 02:59 PM     Lab Results   Component Value Date/Time    CULTURE NO GROWTH 3 DAYS 03/11/2021 02:59 PM       Radiology:  Mri Lumbar Spine W Wo Contrast    Result Date: 3/10/2021  No acute abnormality or abnormal enhancement in lumbar spine. Degenerative disc disease without spinal canal narrowing. Foraminal narrowing, minimal at left L4-5 and bilateral L5-S1. Xr Chest Portable    Result Date: 3/9/2021  No acute process.      Ct Chest Pulmonary Embolism W Contrast    Result Date: 3/9/2021  No evidence of pulmonary embolism or acute pulmonary abnormality. 1.7 cm left thyroid nodule. Thyroid ultrasound is recommended for further characterization if not previously performed. Physical Examination:        General appearance:  Alert and no distress  Mental Status:  oriented to person, place and time and flat affect  Lungs:  clear to auscultation bilaterally, normal effort  Heart:  regular rate and rhythm, no murmur  Abdomen:  soft, nontender, nondistended, normal bowel sounds, no masses, hepatomegaly, splenomegaly  Extremities:  no edema, redness, tenderness in the calves  Skin:  no gross lesions, rashes, induration    Assessment:        Hospital Problems           Last Modified POA    * (Principal) Sepsis ([address] 3/10/2021 Yes    ETOH abuse 3/10/2021 Yes    Acute dehydration 3/10/2021 Yes    Fatty liver 3/10/2021 Yes    Leukopenia 3/10/2021 Yes    Thrombocytopenia ([address] 3/10/2021 Yes    Vitamin D deficiency 3/10/2021 Yes    JACLYN (acute kidney injury) ([address] 3/10/2021 Yes    Hyponatremia 3/10/2021 Yes    Hypocalcemia 3/10/2021 Yes    Hypokalemia 3/10/2021 Yes    Thyroid nodule, left side 3/10/2021 Yes    Hypoalbuminemia 3/10/2021 Yes    Hypoxia 3/10/2021 Yes    Fatigue 3/10/2021 Yes    Encephalopathy 3/13/2021 Yes    Hypothyroidism 3/13/2021 Yes    History of ETOH abuse 3/13/2021 Yes    History of substance abuse ([address] 3/13/2021 Yes          Plan:        Sepsis  -elevated inflammatory markers  -Vancomycin and Rocephin stopped. Stop Acyclovir and azithromycin. Switched to Doxycycline to cover for rickettsiae.  Awaiting cultures to return   -MRI brain unremarkable  -Patient underwent lumbar puncture    Hypocalcemia  - replaced    Alcohol abuse  -     Fatty liver  -monitor    Hypothyroidism  - started on synthroid    Vit D deficiency  - started on drisdol    Leukopenia  - antibiotics as per ID    Thrombocytopenia   - Monitor    False positive HIV  - No HIV discovered    DDD  - foraminal narrowing at L4-5, L5-S1  - monitor Thyroid nodule  - 1.7 cm left thyroid nodule  - Recommend outpatient follow up  - FNA recommended    Zelalem Schaefer MD  3/14/2021  9:44 AM

## 2021-03-14 NOTE — PROGRESS NOTES
DATE: 3/14/2021    NAME: Rayray Linda  MRN: [id number]   : 1965      Patient not seen this date for Physical Therapy due to: Other: Per RN, pt does not want to be bothered. Will defer PT today and check back on pt tomorrow.       Electronically signed by Marquis Bishop PTA on 3/14/2021 at 1:38 PM

## 2021-03-15 VITALS
DIASTOLIC BLOOD PRESSURE: 97 MMHG | OXYGEN SATURATION: 96 % | HEART RATE: 69 BPM | RESPIRATION RATE: 16 BRPM | TEMPERATURE: 98.6 F | SYSTOLIC BLOOD PRESSURE: 120 MMHG | WEIGHT: 224.87 LBS | BODY MASS INDEX: 26.55 KG/M2 | HEIGHT: 77 IN

## 2021-03-15 PROBLEM — A41.9 SEPSIS (HCC): Status: RESOLVED | Noted: 2021-03-09 | Resolved: 2021-03-15

## 2021-03-15 PROBLEM — N17.9 AKI (ACUTE KIDNEY INJURY) (HCC): Status: RESOLVED | Noted: 2021-03-10 | Resolved: 2021-03-15

## 2021-03-15 PROBLEM — E87.1 HYPONATREMIA: Status: RESOLVED | Noted: 2021-03-10 | Resolved: 2021-03-15

## 2021-03-15 PROBLEM — E83.51 HYPOCALCEMIA: Status: RESOLVED | Noted: 2021-03-10 | Resolved: 2021-03-15

## 2021-03-15 PROBLEM — E86.0 ACUTE DEHYDRATION: Status: RESOLVED | Noted: 2020-04-12 | Resolved: 2021-03-15

## 2021-03-15 PROBLEM — E87.6 HYPOKALEMIA: Status: RESOLVED | Noted: 2021-03-10 | Resolved: 2021-03-15

## 2021-03-15 LAB
ANTI-NUCLEAR ANTIBODY (ANA): NEGATIVE
CULTURE: NORMAL
CULTURE: NORMAL
EKG ATRIAL RATE: 93 BPM
EKG P AXIS: 64 DEGREES
EKG P-R INTERVAL: 190 MS
EKG Q-T INTERVAL: 340 MS
EKG QRS DURATION: 64 MS
EKG QTC CALCULATION (BAZETT): 422 MS
EKG R AXIS: 48 DEGREES
EKG T AXIS: 78 DEGREES
EKG VENTRICULAR RATE: 93 BPM
LYME ANTIBODY: 0.68
Lab: NORMAL
Lab: NORMAL
SPECIMEN DESCRIPTION: NORMAL
SPECIMEN DESCRIPTION: NORMAL

## 2021-03-15 PROCEDURE — 6370000000 HC RX 637 (ALT 250 FOR IP): Performed by: INTERNAL MEDICINE

## 2021-03-15 PROCEDURE — 99239 HOSP IP/OBS DSCHRG MGMT >30: CPT | Performed by: INTERNAL MEDICINE

## 2021-03-15 PROCEDURE — 2580000003 HC RX 258: Performed by: NURSE PRACTITIONER

## 2021-03-15 RX ORDER — LEVOTHYROXINE SODIUM 0.1 MG/1
100 TABLET ORAL DAILY
Qty: 30 TABLET | Refills: 3 | Status: SHIPPED | OUTPATIENT
Start: 2021-03-16

## 2021-03-15 RX ORDER — ERGOCALCIFEROL 1.25 MG/1
50000 CAPSULE ORAL WEEKLY
Qty: 12 CAPSULE | Refills: 1 | Status: SHIPPED | OUTPATIENT
Start: 2021-03-15

## 2021-03-15 RX ORDER — MULTIVITAMIN WITH IRON
1 TABLET ORAL DAILY
Qty: 30 TABLET | Refills: 0 | Status: SHIPPED | OUTPATIENT
Start: 2021-03-16

## 2021-03-15 RX ORDER — LANOLIN ALCOHOL/MO/W.PET/CERES
100 CREAM (GRAM) TOPICAL DAILY
Qty: 30 TABLET | Refills: 3 | Status: SHIPPED | OUTPATIENT
Start: 2021-03-16

## 2021-03-15 RX ORDER — PANTOPRAZOLE SODIUM 40 MG/1
40 TABLET, DELAYED RELEASE ORAL
Qty: 30 TABLET | Refills: 3 | Status: SHIPPED | OUTPATIENT
Start: 2021-03-16

## 2021-03-15 RX ADMIN — ERGOCALCIFEROL SOLN 200 MCG/ML (8000 UNIT/ML) 2000 UNITS: 8000 SOLUTION at 08:41

## 2021-03-15 RX ADMIN — ALCOHOL 1 TABLET: 70.47 GEL TOPICAL at 08:41

## 2021-03-15 RX ADMIN — DOXYCYCLINE HYCLATE 100 MG: 100 TABLET, COATED ORAL at 08:41

## 2021-03-15 RX ADMIN — SODIUM CHLORIDE, PRESERVATIVE FREE 10 ML: 5 INJECTION INTRAVENOUS at 08:42

## 2021-03-15 RX ADMIN — PANTOPRAZOLE SODIUM 40 MG: 40 TABLET, DELAYED RELEASE ORAL at 08:41

## 2021-03-15 RX ADMIN — Medication 100 MG: at 08:41

## 2021-03-15 RX ADMIN — LEVOTHYROXINE SODIUM 100 MCG: 100 TABLET ORAL at 08:41

## 2021-03-15 NOTE — PROGRESS NOTES
Pt refused morning labs.  RN educated patient of importance of morning lab draws while he is in the hospital. Will continue POC

## 2021-03-15 NOTE — PROGRESS NOTES
RN paged on call NPAline regarding patient being uncooperative and refusing for RN to obtain routine vitals and assessment. Patient is alert and oriented x4, he states that he will only take his antibiotic and nothing else. RN educated patient on the importance of vital signs and assessments while he is in the hospital. Pt agreed to keep the heart monitor, but nothing else. Patient stated he wanted writer to get out of his room and leave him alone. Writer stated that they will still be doing hourly rounding to ensure safety of the patient. RN will reapporach at a later time.

## 2021-03-15 NOTE — PROGRESS NOTES
Patient got doxycycline from meds to bed. The other meds were not covered under insurance. I asked which pharmacy he would like scripts sent and he could not provide the name of a pharmacy. The pharmacy tech from meds to bed informed me that she tried to get a pharmacy to have scripts sent. The patient did not provide the name of pharmacy.

## 2021-03-15 NOTE — DISCHARGE INSTR - COC
Continuity of Care Form    Patient Name: Elizabet Weiner   :  1965  MRN:  [id number]    Admit date:  3/9/2021  Discharge date:  ***    Code Status Order: Full Code   Advance Directives:      Admitting Physician:  Lindsey Guillermo MD  PCP: No primary care provider on file. Discharging Nurse: [facility] Unit/Room#: 3007/3007-01  Discharging Unit Phone Number: ***    Emergency Contact:   Extended Emergency Contact Information  Primary Emergency Contact: [address] [place] [address] Phone: [phone]  Relation: Aunt/Uncle    Past Surgical History:  History reviewed. No pertinent surgical history. Immunization History: There is no immunization history for the selected administration types on file for this patient.     Active Problems:  Patient Active Problem List   Diagnosis Code    Suspected COVID-19 virus infection Z20.822    Other chest pain R07.89    Gastrointestinal hemorrhage K92.2    ETOH abuse F10.10    Transaminasemia R74.01    SIRS (systemic inflammatory response syndrome) (HCC) R65.10    Hematuria R31.9    Cystitis N30.90    Cocaine use F14.90    Fatty liver K76.0    GI bleed K92.2    Leukopenia D72.819    Thrombocytopenia (HCC) D69.6    Vitamin D deficiency E55.9    Thyroid nodule, left side E04.1    Hypoalbuminemia E88.09    Fatigue R53.83    Hypothyroidism E03.9    History of ETOH abuse F10.11    History of substance abuse ([facility] F19.11       Isolation/Infection:   Isolation            No Isolation          Patient Infection Status       Infection Onset Added Last Indicated Last Indicated By Review Planned Expiration Resolved Resolved By    None active    Resolved    COVID-19 Rule Out 03/10/21 03/10/21 03/10/21 Respiratory Panel, Molecular, with COVID-19 (Restricted: peds pts or suitable admitted adults) (Ordered)   03/10/21 Rule-Out Test Resulted    COVID-19 Rule Out 21 COVID-19, Rapid (Ordered)   21 Rule-Out Test Resulted    COVID-19 Rule Out 20 20 COVID-19 (Ordered)   20 Rule-Out Test Resulted            Nurse Assessment:  Last Vital Signs: BP (!) 120/97   Pulse 67   Temp 98.6 °F (37 °C) (Oral)   Resp 16   Ht 6' 5\" (1.956 m)   Wt 224 lb 13.9 oz (102 kg)   SpO2 96%   BMI 26.67 kg/m²     Last documented pain score (0-10 scale): Pain Level: 0  Last Weight:   Wt Readings from Last 1 Encounters:   21 224 lb 13.9 oz (102 kg)     Mental Status:  {IP PT MENTAL STATUS::::0}    IV Access:  { CAPRICE IV ACCESS:251973066:::0}    Nursing Mobility/ADLs:  Walking   {CHP DME ADLs:510030800:::0}  Transfer  {CHP DME ADLs:052669559:::0}  Bathing  {CHP DME ADLs:421089278:::0}  Dressing  {CHP DME ADLs:107365323:::0}  Toileting  {CHP DME ADLs:879840846:::0}  Feeding  {CHP DME ADLs:097406235:::0}  Med Admin  {CHP DME ADLs:687424315:::0}  Med Delivery   { CAPRICE MED Delivery:942650076:::0}    Wound Care Documentation and Therapy:        Elimination:  Continence: Bowel: {YES / JU:79579}  Bladder: {YES / JX:91469}  Urinary Catheter: {Urinary Catheter:906310420:::0}   Colostomy/Ileostomy/Ileal Conduit: {YES / YC:57259}       Date of Last BM: ***    Intake/Output Summary (Last 24 hours) at 3/15/2021 1004  Last data filed at 3/15/2021 0830  Gross per 24 hour   Intake 320 ml   Output    Net 320 ml     I/O last 3 completed shifts:   In: 120 [P.O.:120]  Out: -     Safety Concerns:     [address] Safety Concerns:511482697:::0}    Impairments/Disabilities:      [address] Impairments/Disabilities:985541297:::0}    Nutrition Therapy:  Current Nutrition Therapy:   [address] Diet List:451085604:::0}    Routes of Feeding: {CHP DME Other Feedings:656728542:::0}  Liquids: {Slp liquid thickness:99861}  Daily Fluid Restriction: {CHP DME Yes amt example:937912468:::0}  Last Modified Barium Swallow with Video (Video Swallowing Test): {Done Not Done HOE:::3}    Treatments at the Time of Hospital Discharge:   Respiratory Treatments: ***  Oxygen Therapy: {Therapy; copd oxygen:21303:::0}  Ventilator:    {MH CC Vent List:018326742:::0}    Rehab Therapies: {THERAPEUTIC INTERVENTION:7681259049}  Weight Bearing Status/Restrictions: [address] CC Weight Bearin:::0}  Other Medical Equipment (for information only, NOT a DME order):  {EQUIPMENT:435066874}  Other Treatments: ***    Patient's personal belongings (please select all that are sent with patient):  {CHP DME Belongings:207779477:::0}    RN SIGNATURE:  {Esignature:934481205:::0}    CASE MANAGEMENT/SOCIAL WORK SECTION    Inpatient Status Date: ***    Readmission Risk Assessment Score:  Readmission Risk              Risk of Unplanned Readmission:        17           Discharging to Facility/ Agency   Name:   Address:  Phone:  Fax:    Dialysis Facility (if applicable)   Name:  Address:  Dialysis Schedule:  Phone:  Fax:    / signature: {Esignature:694668463:::0}    PHYSICIAN SECTION    Prognosis: Guarded    Condition at Discharge: Stable    Rehab Potential (if transferring to Rehab): Guarded    Recommended Labs or Other Treatments After Discharge: F/u with PCP, ID, Hem/Onc    Physician Certification: Home    Update Admission H&P: No change in H&P    PHYSICIAN SIGNATURE:  Electronically signed by Rad Coates MD on 3/15/21 at 10:05 AM EDT

## 2021-03-15 NOTE — DISCHARGE SUMMARY
[facility]  Office: [phone]  Joanne Hinton DO, Layla Cordon DO, Jaspal Borden DO, Eugenie Weathers, DO, Jalen Brown MD, Sariah Ramirez MD, Miriam Luna MD, Aguilar Law MD, Daniele Mccullough MD, Jacqui Dempsey MD, Lucero Ward MD, Deonte Mcgee MD, Kimmy Noel MD, Dean Schreiber DO, Jael Dent MD, Ilsa Salter DO, Colby Burns MD,  Nathan Celaya DO, Steven Roach MD, Sarkis Galeana MD, Daniel Denny, [facility], [address], [facility], Sharmaine Closs, CNP, Carol Devlin, CNS, Caitlyn Hernandez, CNP, Keena Regalado, CNP, Breanna Roblero, CNP, Bry Turk, CNP, Deja Juarez, CNP, Dedra Hayward PA-C, Jack Rosa, [facility], Brand Line, CNP, Owen, CNP, Shruti Rogers, CNP, Maile Davidson, CNP, Ivy Torres, CNP, Melrose Snellen, [facility]    Discharge Summary     Patient ID: Iyv Ferguson  :  1965   MRN: [id number]     ACCOUNT:  [de-identified]   Patient's PCP: No primary care provider on file. Admit Date: 3/9/2021   Discharge Date: 3/15/2021     Length of Stay: 6  Code Status:  Full Code  Admitting Physician: Sarkis Galeana MD  Discharge Physician: Sarkis Galeana MD     Active Discharge Diagnoses:     Hospital Problem Lists:  Principal Problem (Resolved):    Sepsis ([facility]  Active Problems:    ETOH abuse    Fatty liver    Leukopenia    Thrombocytopenia (HCC)    Vitamin D deficiency    Thyroid nodule, left side    Hypoalbuminemia    Fatigue    Hypothyroidism    History of ETOH abuse    History of substance abuse ([facility]  Resolved Problems:    Acute dehydration    JACLYN (acute kidney injury) ([facility]    Hyponatremia    Hypocalcemia    Hypokalemia    Hypoxia    Encephalopathy      Admission Condition:  poor     Discharged Condition: fair    Hospital Stay:     Hospital Course: This is a 64year old male who came in with fatigue, shortness of breath, nausea and vomiting.  Pt was noted to have sepsis, alcohol abuse, leukopenia, fatty liver and multiple labs abnormalities. The course of his hospital stay patient was initially treated with vancomycin, Rocephin, acyclovir and azithromycin. Later on he was switched to doxycycline to cover for Rickettsia. Some of the cultures are still pending. Patient will follow up as an outpatient. He underwent MRI of the brain which was unremarkable. Patient also underwent lumbar puncture. Patient was noted to be low on his thyroid hormones as well as vitamin D. He was started on Synthroid and vitamin D supplements. Patient became uncooperative towards the later course of his hospital stay and started refusing labs, vital signs are letting me examine him. Patient has been cleared for discharge by neurology, heme-onc, ID. Patient has been advised to follow-up with doctors as an outpatient. Patient also has thyroid nodule 1.7 cm left thyroid nodule and is recommended to get FNA as an outpatient.     Significant therapeutic interventions: Antibiotics    Significant Diagnostic Studies:   Labs / Micro:  CBC:   Lab Results   Component Value Date    WBC 3.4 03/14/2021    RBC 4.50 03/14/2021    HGB 12.7 03/14/2021    HCT 39.6 03/14/2021    MCV 88.0 03/14/2021    MCH 28.2 03/14/2021    MCHC 32.1 03/14/2021    RDW 14.2 03/14/2021     03/14/2021     BMP:    Lab Results   Component Value Date    GLUCOSE 86 03/14/2021     03/14/2021    K 3.9 03/14/2021     03/14/2021    CO2 23 03/14/2021    ANIONGAP 7 03/14/2021    BUN 7 03/14/2021    CREATININE 0.81 03/14/2021    BUNCRER NOT REPORTED 03/14/2021    CALCIUM 7.7 03/14/2021    LABGLOM >60 03/14/2021    GFRAA >60 03/14/2021    GFR      03/14/2021    GFR NOT REPORTED 03/14/2021     HFP:    Lab Results   Component Value Date    PROT 6.3 03/10/2021     CMP:    Lab Results   Component Value Date    GLUCOSE 86 03/14/2021     03/14/2021    K 3.9 03/14/2021     03/14/2021    CO2 23 03/14/2021    BUN 7 03/14/2021 CREATININE 0.81 03/14/2021    ANIONGAP 7 03/14/2021    ALKPHOS 45 03/10/2021    ALT 27 03/10/2021     03/10/2021    BILITOT 0.25 03/10/2021    LABALBU 2.7 03/10/2021    ALBUMIN 0.8 03/10/2021    LABGLOM >60 03/14/2021    GFRAA >60 03/14/2021    GFR      03/14/2021    GFR NOT REPORTED 03/14/2021    PROT 6.3 03/10/2021    CALCIUM 7.7 03/14/2021     PT/INR:    Lab Results   Component Value Date    PROTIME 11.3 03/10/2021    INR 1.1 03/10/2021     PTT:   Lab Results   Component Value Date    APTT 32.6 03/10/2021     FLP:  No results found for: CHOL, TRIG, HDL  U/A:    Lab Results   Component Value Date    COLORU YELLOW 03/10/2021    TURBIDITY CLEAR 03/10/2021    SPECGRAV 1.042 03/10/2021    HGBUR LARGE 03/10/2021    PHUR 5.5 03/10/2021    PROTEINU 4+ 03/10/2021    GLUCOSEU NEGATIVE 03/10/2021    KETUA NEGATIVE 03/10/2021    BILIRUBINUR NEGATIVE 03/10/2021    UROBILINOGEN Normal 03/10/2021    NITRU NEGATIVE 03/10/2021    LEUKOCYTESUR NEGATIVE 03/10/2021     TSH:    Lab Results   Component Value Date    TSH 5.29 03/11/2021        Radiology:  Ct Head Wo Contrast    Result Date: 3/11/2021  Normal CT of the brain. Mri Lumbar Spine W Wo Contrast    Result Date: 3/10/2021  No acute abnormality or abnormal enhancement in lumbar spine. Degenerative disc disease without spinal canal narrowing. Foraminal narrowing, minimal at left L4-5 and bilateral L5-S1. Xr Chest Portable    Result Date: 3/9/2021  No acute process. Ct Chest Pulmonary Embolism W Contrast    Result Date: 3/9/2021  No evidence of pulmonary embolism or acute pulmonary abnormality. 1.7 cm left thyroid nodule. Thyroid ultrasound is recommended for further characterization if not previously performed. Ir Lumbar Puncture For Diagnosis    Result Date: 3/11/2021  Successful fluoroscopic-guided lumbar puncture.      Mri Brain W Wo Contrast    Result Date: 3/11/2021  Unremarkable MRI of the brain       Consultations:    Consults:     Final Specialist Recommendations/Findings:   IP CONSULT TO HOSPITALIST  IP CONSULT TO INFECTIOUS DISEASES  IP CONSULT TO ONCOLOGY      The patient was seen and examined on day of discharge and this discharge summary is in conjunction with any daily progress note from day of discharge.     Discharge plan:     Disposition: Home    Physician Follow Up:     Consuelo Prather MD  [address] [place] [address], [address]  [address]  [address]  [phone]    Schedule an appointment as soon as possible for a visit in 2 weeks  infectious diseases       Requiring Further Evaluation/Follow Up POST HOSPITALIZATION/Incidental Findings: Be compliant with your medications    Diet: regular diet    Activity: As tolerated    Instructions to Patient: Be compliant with your medications, diet and follow-ups    Discharge Medications:      Medication List      START taking these medications    doxycycline hyclate 100 MG tablet  Commonly known as: VIBRA-TABS  Take 1 tablet by mouth every 12 hours for 10 days     levothyroxine 100 MCG tablet  Commonly known as: SYNTHROID  Take 1 tablet by mouth Daily  Start taking on: March 16, 2021     multivitamin Tabs tablet  Take 1 tablet by mouth daily  Start taking on: March 16, 2021     pantoprazole 40 MG tablet  Commonly known as: PROTONIX  Take 1 tablet by mouth every morning (before breakfast)  Start taking on: March 16, 2021     thiamine 100 MG tablet  Take 1 tablet by mouth daily  Start taking on: March 16, 2021     vitamin D 1.25 MG (67483 UT) Caps capsule  Commonly known as: ERGOCALCIFEROL  Take 1 capsule by mouth once a week           Where to Get Your Medications      These medications were sent to [address] [address]    Phone: [phone]   · doxycycline hyclate 100 MG tablet  · levothyroxine 100 MCG tablet  · multivitamin Tabs tablet  · pantoprazole 40 MG tablet  · thiamine 100 MG tablet  · vitamin D 1.25 MG (68546 UT) Caps capsule         Discharge Procedure Orders   CBC With Auto Differential   Standing Status: Future Standing Exp. Date: 03/14/22       Time Spent on discharge is  40 mins in patient examination, evaluation, counseling as well as medication reconciliation, prescriptions for required medications, discharge plan and follow up. Electronically signed by   Tila Su MD  3/15/2021  10:05 AM      Thank you Dr. Juares primary care provider on file. for the opportunity to be involved in this patient's care.

## 2021-03-15 NOTE — PROGRESS NOTES
DATE: 3/15/2021    NAME: Domenic Sandoval  MRN: [id number]   : 1965      Patient not seen this date for Physical Therapy due to: Other: PTA asked pt if he'd like to do PT today, pt said \"No, Marline Chan' I'm getting ready to be released. \"      Electronically signed by Carmella Guzman PTA on 3/15/2021 at 10:37 AM

## 2021-03-16 LAB
PARVOVIRUS B19 IGG ANTIBODY: 1.32 IV
PARVOVIRUS B19 IGM ANTIBODY: 0.68 IV
VDRL CSF SCREEN: NONREACTIVE

## 2021-03-17 LAB
-: NORMAL
DIRECT EXAM: NORMAL
DIRECT EXAM: NORMAL
Lab: NORMAL
MISCELLANEOUS LAB TEST RESULT: NORMAL
REASON FOR REJECTION: NORMAL
SPECIMEN DESCRIPTION: NORMAL
TEST NAME: NORMAL
ZZ NTE CLEAN UP: ORDERED TEST: NORMAL
ZZ NTE WITH NAME CLEAN UP: SPECIMEN SOURCE: NORMAL

## 2021-04-12 LAB
CULTURE: NORMAL
Lab: NORMAL
SPECIMEN DESCRIPTION: NORMAL

## 2021-05-19 ENCOUNTER — HOSPITAL ENCOUNTER (OUTPATIENT)
Age: 56
Discharge: HOME | End: 2021-05-19
Payer: MEDICAID

## 2021-05-19 LAB
-: NORMAL
ABSOLUTE EOS #: 0.08 K/UL (ref 0–0.4)
ABSOLUTE IMMATURE GRANULOCYTE: 0 K/UL (ref 0–0.3)
ABSOLUTE LYMPH #: 1.46 K/UL (ref 1–4.8)
ABSOLUTE MONO #: 0.17 K/UL (ref 0.1–0.8)
ALBUMIN SERPL-MCNC: 4.2 G/DL (ref 3.5–5.2)
ALBUMIN/GLOBULIN RATIO: 1 (ref 1–2.5)
ALP BLD-CCNC: 90 U/L (ref 40–129)
ALT SERPL-CCNC: 15 U/L (ref 5–41)
AMORPHOUS: NORMAL
ANION GAP SERPL CALCULATED.3IONS-SCNC: 11 MMOL/L (ref 9–17)
AST SERPL-CCNC: 23 U/L
BACTERIA: NORMAL
BASOPHILS # BLD: 0 % (ref 0–2)
BASOPHILS ABSOLUTE: 0 K/UL (ref 0–0.2)
BILIRUB SERPL-MCNC: 0.45 MG/DL (ref 0.3–1.2)
BILIRUBIN URINE: NEGATIVE
BUN BLDV-MCNC: 19 MG/DL (ref 6–20)
BUN/CREAT BLD: ABNORMAL (ref 9–20)
CALCIUM SERPL-MCNC: 9.4 MG/DL (ref 8.6–10.4)
CASTS UA: NORMAL /LPF (ref 0–8)
CHLORIDE BLD-SCNC: 106 MMOL/L (ref 98–107)
CHOLESTEROL, FASTING: 179 MG/DL
CHOLESTEROL/HDL RATIO: 3.4
CO2: 23 MMOL/L (ref 20–31)
COLOR: YELLOW
COMMENT UA: ABNORMAL
CREAT SERPL-MCNC: 0.91 MG/DL (ref 0.7–1.2)
CRYSTALS, UA: NORMAL /HPF
DIFFERENTIAL TYPE: ABNORMAL
EOSINOPHILS RELATIVE PERCENT: 3 % (ref 1–4)
EPITHELIAL CELLS UA: NORMAL /HPF (ref 0–5)
FOLATE: 12.8 NG/ML
GFR AFRICAN AMERICAN: >60 ML/MIN
GFR NON-AFRICAN AMERICAN: >60 ML/MIN
GFR SERPL CREATININE-BSD FRML MDRD: ABNORMAL ML/MIN/{1.73_M2}
GFR SERPL CREATININE-BSD FRML MDRD: ABNORMAL ML/MIN/{1.73_M2}
GLUCOSE BLD-MCNC: 79 MG/DL (ref 70–99)
GLUCOSE URINE: NEGATIVE
HCT VFR BLD CALC: 46.8 % (ref 40.7–50.3)
HDLC SERPL-MCNC: 52 MG/DL
HEMOGLOBIN: 13.9 G/DL (ref 13–17)
IMMATURE GRANULOCYTES: 0 %
KETONES, URINE: NEGATIVE
LDL CHOLESTEROL: 111 MG/DL (ref 0–130)
LEUKOCYTE ESTERASE, URINE: NEGATIVE
LYMPHOCYTES # BLD: 52 % (ref 24–44)
MAGNESIUM: 1.8 MG/DL (ref 1.6–2.6)
MCH RBC QN AUTO: 28.8 PG (ref 25.2–33.5)
MCHC RBC AUTO-ENTMCNC: 29.7 G/DL (ref 28.4–34.8)
MCV RBC AUTO: 96.9 FL (ref 82.6–102.9)
MONOCYTES # BLD: 6 % (ref 1–7)
MORPHOLOGY: NORMAL
MUCUS: NORMAL
NITRITE, URINE: NEGATIVE
NRBC AUTOMATED: 0 PER 100 WBC
OTHER OBSERVATIONS UA: NORMAL
PDW BLD-RTO: 13.4 % (ref 11.8–14.4)
PH UA: 6 (ref 5–8)
PLATELET # BLD: 218 K/UL (ref 138–453)
PLATELET ESTIMATE: ABNORMAL
PMV BLD AUTO: 10.4 FL (ref 8.1–13.5)
POTASSIUM SERPL-SCNC: 4.1 MMOL/L (ref 3.7–5.3)
PROTEIN UA: ABNORMAL
RBC # BLD: 4.83 M/UL (ref 4.21–5.77)
RBC # BLD: ABNORMAL 10*6/UL
RBC UA: NORMAL /HPF (ref 0–4)
RENAL EPITHELIAL, UA: NORMAL /HPF
SEG NEUTROPHILS: 39 % (ref 36–66)
SEGMENTED NEUTROPHILS ABSOLUTE COUNT: 1.09 K/UL (ref 1.8–7.7)
SODIUM BLD-SCNC: 140 MMOL/L (ref 135–144)
SPECIFIC GRAVITY UA: 1.02 (ref 1–1.03)
T. PALLIDUM, IGG: NONREACTIVE
TOTAL PROTEIN: 8.4 G/DL (ref 6.4–8.3)
TRICHOMONAS: NORMAL
TRIGLYCERIDE, FASTING: 81 MG/DL
TSH SERPL DL<=0.05 MIU/L-ACNC: 1.54 MIU/L (ref 0.3–5)
TURBIDITY: CLEAR
URINE HGB: NEGATIVE
UROBILINOGEN, URINE: NORMAL
VITAMIN B-12: 493 PG/ML (ref 232–1245)
VITAMIN D 25-HYDROXY: 16.5 NG/ML (ref 30–100)
VLDLC SERPL CALC-MCNC: NORMAL MG/DL (ref 1–30)
WBC # BLD: 2.8 K/UL (ref 3.5–11.3)
WBC # BLD: ABNORMAL 10*3/UL
WBC UA: NORMAL /HPF (ref 0–5)
YEAST: NORMAL

## 2021-05-19 PROCEDURE — 82306 VITAMIN D 25 HYDROXY: CPT

## 2021-05-19 PROCEDURE — 82746 ASSAY OF FOLIC ACID SERUM: CPT

## 2021-05-19 PROCEDURE — 82607 VITAMIN B-12: CPT

## 2021-05-19 PROCEDURE — 84443 ASSAY THYROID STIM HORMONE: CPT

## 2021-05-19 PROCEDURE — 85025 COMPLETE CBC W/AUTO DIFF WBC: CPT

## 2021-05-19 PROCEDURE — 86780 TREPONEMA PALLIDUM: CPT

## 2021-05-19 PROCEDURE — 81001 URINALYSIS AUTO W/SCOPE: CPT

## 2021-05-19 PROCEDURE — 83735 ASSAY OF MAGNESIUM: CPT

## 2021-05-19 PROCEDURE — 36415 COLL VENOUS BLD VENIPUNCTURE: CPT

## 2021-05-19 PROCEDURE — 80061 LIPID PANEL: CPT

## 2021-05-19 PROCEDURE — 80053 COMPREHEN METABOLIC PANEL: CPT
